# Patient Record
Sex: MALE | Race: BLACK OR AFRICAN AMERICAN | NOT HISPANIC OR LATINO | URBAN - METROPOLITAN AREA
[De-identification: names, ages, dates, MRNs, and addresses within clinical notes are randomized per-mention and may not be internally consistent; named-entity substitution may affect disease eponyms.]

---

## 2017-09-20 PROBLEM — Z00.00 ENCOUNTER FOR PREVENTIVE HEALTH EXAMINATION: Status: ACTIVE | Noted: 2017-09-20

## 2021-06-21 ENCOUNTER — INPATIENT (INPATIENT)
Facility: HOSPITAL | Age: 56
LOS: 31 days | Discharge: HOME HEALTH | DRG: 949 | End: 2021-07-23
Attending: STUDENT IN AN ORGANIZED HEALTH CARE EDUCATION/TRAINING PROGRAM | Admitting: STUDENT IN AN ORGANIZED HEALTH CARE EDUCATION/TRAINING PROGRAM
Payer: MEDICAID

## 2021-06-21 VITALS
OXYGEN SATURATION: 97 % | HEART RATE: 66 BPM | DIASTOLIC BLOOD PRESSURE: 92 MMHG | RESPIRATION RATE: 15 BRPM | TEMPERATURE: 98 F | SYSTOLIC BLOOD PRESSURE: 164 MMHG

## 2021-06-21 DIAGNOSIS — E87.5 HYPERKALEMIA: ICD-10-CM

## 2021-06-21 DIAGNOSIS — R29.898 OTHER SYMPTOMS AND SIGNS INVOLVING THE MUSCULOSKELETAL SYSTEM: ICD-10-CM

## 2021-06-21 DIAGNOSIS — R27.8 OTHER LACK OF COORDINATION: ICD-10-CM

## 2021-06-21 DIAGNOSIS — J06.9 ACUTE UPPER RESPIRATORY INFECTION, UNSPECIFIED: ICD-10-CM

## 2021-06-21 DIAGNOSIS — R45.87 IMPULSIVENESS: ICD-10-CM

## 2021-06-21 DIAGNOSIS — F32.9 MAJOR DEPRESSIVE DISORDER, SINGLE EPISODE, UNSPECIFIED: ICD-10-CM

## 2021-06-21 DIAGNOSIS — N17.9 ACUTE KIDNEY FAILURE, UNSPECIFIED: ICD-10-CM

## 2021-06-21 DIAGNOSIS — R26.9 UNSPECIFIED ABNORMALITIES OF GAIT AND MOBILITY: ICD-10-CM

## 2021-06-21 DIAGNOSIS — I63.9 CEREBRAL INFARCTION, UNSPECIFIED: ICD-10-CM

## 2021-06-21 DIAGNOSIS — F17.210 NICOTINE DEPENDENCE, CIGARETTES, UNCOMPLICATED: ICD-10-CM

## 2021-06-21 DIAGNOSIS — R53.83 OTHER FATIGUE: ICD-10-CM

## 2021-06-21 DIAGNOSIS — I69.311 MEMORY DEFICIT FOLLOWING CEREBRAL INFARCTION: ICD-10-CM

## 2021-06-21 DIAGNOSIS — B97.89 OTHER VIRAL AGENTS AS THE CAUSE OF DISEASES CLASSIFIED ELSEWHERE: ICD-10-CM

## 2021-06-21 DIAGNOSIS — I69.391 DYSPHAGIA FOLLOWING CEREBRAL INFARCTION: ICD-10-CM

## 2021-06-21 DIAGNOSIS — N18.30 CHRONIC KIDNEY DISEASE, STAGE 3 UNSPECIFIED: ICD-10-CM

## 2021-06-21 DIAGNOSIS — E11.22 TYPE 2 DIABETES MELLITUS WITH DIABETIC CHRONIC KIDNEY DISEASE: ICD-10-CM

## 2021-06-21 DIAGNOSIS — Z51.89 ENCOUNTER FOR OTHER SPECIFIED AFTERCARE: ICD-10-CM

## 2021-06-21 DIAGNOSIS — I69.314 FRONTAL LOBE AND EXECUTIVE FUNCTION DEFICIT FOLLOWING CEREBRAL INFARCTION: ICD-10-CM

## 2021-06-21 DIAGNOSIS — E44.0 MODERATE PROTEIN-CALORIE MALNUTRITION: ICD-10-CM

## 2021-06-21 DIAGNOSIS — E88.09 OTHER DISORDERS OF PLASMA-PROTEIN METABOLISM, NOT ELSEWHERE CLASSIFIED: ICD-10-CM

## 2021-06-21 DIAGNOSIS — I69.312 VISUOSPATIAL DEFICIT AND SPATIAL NEGLECT FOLLOWING CEREBRAL INFARCTION: ICD-10-CM

## 2021-06-21 DIAGNOSIS — E11.40 TYPE 2 DIABETES MELLITUS WITH DIABETIC NEUROPATHY, UNSPECIFIED: ICD-10-CM

## 2021-06-21 DIAGNOSIS — H54.7 UNSPECIFIED VISUAL LOSS: ICD-10-CM

## 2021-06-21 DIAGNOSIS — I12.9 HYPERTENSIVE CHRONIC KIDNEY DISEASE WITH STAGE 1 THROUGH STAGE 4 CHRONIC KIDNEY DISEASE, OR UNSPECIFIED CHRONIC KIDNEY DISEASE: ICD-10-CM

## 2021-06-21 DIAGNOSIS — E78.5 HYPERLIPIDEMIA, UNSPECIFIED: ICD-10-CM

## 2021-06-21 DIAGNOSIS — R60.0 LOCALIZED EDEMA: ICD-10-CM

## 2021-06-21 DIAGNOSIS — R80.8 OTHER PROTEINURIA: ICD-10-CM

## 2021-06-21 DIAGNOSIS — I69.322 DYSARTHRIA FOLLOWING CEREBRAL INFARCTION: ICD-10-CM

## 2021-06-21 DIAGNOSIS — I69.310 ATTENTION AND CONCENTRATION DEFICIT FOLLOWING CEREBRAL INFARCTION: ICD-10-CM

## 2021-06-21 DIAGNOSIS — G47.00 INSOMNIA, UNSPECIFIED: ICD-10-CM

## 2021-06-21 LAB
GLUCOSE BLDC GLUCOMTR-MCNC: 189 MG/DL — HIGH (ref 70–99)
GLUCOSE BLDC GLUCOMTR-MCNC: 198 MG/DL — HIGH (ref 70–99)

## 2021-06-21 RX ORDER — SENNA PLUS 8.6 MG/1
2 TABLET ORAL AT BEDTIME
Refills: 0 | Status: DISCONTINUED | OUTPATIENT
Start: 2021-06-21 | End: 2021-07-23

## 2021-06-21 RX ORDER — GLUCAGON INJECTION, SOLUTION 0.5 MG/.1ML
1 INJECTION, SOLUTION SUBCUTANEOUS ONCE
Refills: 0 | Status: DISCONTINUED | OUTPATIENT
Start: 2021-06-21 | End: 2021-07-23

## 2021-06-21 RX ORDER — APIXABAN 2.5 MG/1
2.5 TABLET, FILM COATED ORAL EVERY 12 HOURS
Refills: 0 | Status: DISCONTINUED | OUTPATIENT
Start: 2021-06-21 | End: 2021-07-23

## 2021-06-21 RX ORDER — AMLODIPINE BESYLATE 2.5 MG/1
10 TABLET ORAL DAILY
Refills: 0 | Status: DISCONTINUED | OUTPATIENT
Start: 2021-06-21 | End: 2021-06-22

## 2021-06-21 RX ORDER — SODIUM CHLORIDE 9 MG/ML
1000 INJECTION, SOLUTION INTRAVENOUS
Refills: 0 | Status: DISCONTINUED | OUTPATIENT
Start: 2021-06-21 | End: 2021-07-23

## 2021-06-21 RX ORDER — GABAPENTIN 400 MG/1
600 CAPSULE ORAL AT BEDTIME
Refills: 0 | Status: DISCONTINUED | OUTPATIENT
Start: 2021-06-21 | End: 2021-07-23

## 2021-06-21 RX ORDER — LABETALOL HCL 100 MG
50 TABLET ORAL
Refills: 0 | Status: DISCONTINUED | OUTPATIENT
Start: 2021-06-21 | End: 2021-06-22

## 2021-06-21 RX ORDER — NICOTINE POLACRILEX 2 MG
1 GUM BUCCAL DAILY
Refills: 0 | Status: DISCONTINUED | OUTPATIENT
Start: 2021-06-21 | End: 2021-07-11

## 2021-06-21 RX ORDER — INSULIN LISPRO 100/ML
VIAL (ML) SUBCUTANEOUS
Refills: 0 | Status: DISCONTINUED | OUTPATIENT
Start: 2021-06-21 | End: 2021-07-23

## 2021-06-21 RX ORDER — DEXTROSE 50 % IN WATER 50 %
25 SYRINGE (ML) INTRAVENOUS ONCE
Refills: 0 | Status: DISCONTINUED | OUTPATIENT
Start: 2021-06-21 | End: 2021-07-23

## 2021-06-21 RX ORDER — CITALOPRAM 10 MG/1
20 TABLET, FILM COATED ORAL DAILY
Refills: 0 | Status: DISCONTINUED | OUTPATIENT
Start: 2021-06-21 | End: 2021-06-21

## 2021-06-21 RX ORDER — TRAZODONE HCL 50 MG
50 TABLET ORAL AT BEDTIME
Refills: 0 | Status: DISCONTINUED | OUTPATIENT
Start: 2021-06-21 | End: 2021-07-13

## 2021-06-21 RX ORDER — INSULIN LISPRO 100/ML
VIAL (ML) SUBCUTANEOUS AT BEDTIME
Refills: 0 | Status: DISCONTINUED | OUTPATIENT
Start: 2021-06-21 | End: 2021-07-23

## 2021-06-21 RX ORDER — ESCITALOPRAM OXALATE 10 MG/1
10 TABLET, FILM COATED ORAL DAILY
Refills: 0 | Status: DISCONTINUED | OUTPATIENT
Start: 2021-06-21 | End: 2021-06-21

## 2021-06-21 RX ORDER — CITALOPRAM 10 MG/1
20 TABLET, FILM COATED ORAL DAILY
Refills: 0 | Status: DISCONTINUED | OUTPATIENT
Start: 2021-06-21 | End: 2021-07-23

## 2021-06-21 RX ORDER — ACETAMINOPHEN 500 MG
650 TABLET ORAL EVERY 6 HOURS
Refills: 0 | Status: DISCONTINUED | OUTPATIENT
Start: 2021-06-21 | End: 2021-07-23

## 2021-06-21 RX ORDER — LABETALOL HCL 100 MG
50 TABLET ORAL ONCE
Refills: 0 | Status: COMPLETED | OUTPATIENT
Start: 2021-06-21 | End: 2021-06-21

## 2021-06-21 RX ORDER — ATORVASTATIN CALCIUM 80 MG/1
80 TABLET, FILM COATED ORAL AT BEDTIME
Refills: 0 | Status: DISCONTINUED | OUTPATIENT
Start: 2021-06-21 | End: 2021-07-23

## 2021-06-21 RX ORDER — DEXTROSE 50 % IN WATER 50 %
12.5 SYRINGE (ML) INTRAVENOUS ONCE
Refills: 0 | Status: DISCONTINUED | OUTPATIENT
Start: 2021-06-21 | End: 2021-07-23

## 2021-06-21 RX ORDER — DEXTROSE 50 % IN WATER 50 %
15 SYRINGE (ML) INTRAVENOUS ONCE
Refills: 0 | Status: DISCONTINUED | OUTPATIENT
Start: 2021-06-21 | End: 2021-07-23

## 2021-06-21 RX ADMIN — Medication 50 MILLIGRAM(S): at 22:50

## 2021-06-21 RX ADMIN — ATORVASTATIN CALCIUM 80 MILLIGRAM(S): 80 TABLET, FILM COATED ORAL at 22:07

## 2021-06-21 RX ADMIN — Medication 50 MILLIGRAM(S): at 22:07

## 2021-06-21 RX ADMIN — Medication 50 MILLIGRAM(S): at 18:39

## 2021-06-21 RX ADMIN — APIXABAN 2.5 MILLIGRAM(S): 2.5 TABLET, FILM COATED ORAL at 18:39

## 2021-06-21 RX ADMIN — GABAPENTIN 600 MILLIGRAM(S): 400 CAPSULE ORAL at 22:07

## 2021-06-21 RX ADMIN — SENNA PLUS 2 TABLET(S): 8.6 TABLET ORAL at 22:07

## 2021-06-21 RX ADMIN — Medication 1: at 18:35

## 2021-06-21 NOTE — PATIENT PROFILE ADULT - VISION (WITH CORRECTIVE LENSES IF THE PATIENT USUALLY WEARS THEM):
right visual deficit/Partially impaired: cannot see medication labels or newsprint, but can see obstacles in path, and the surrounding layout; can count fingers at arm's length

## 2021-06-21 NOTE — H&P ADULT - NSHPREVIEWOFSYSTEMS_GEN_ALL_CORE
REVIEW OF SYSTEMS  Constitutional: No fever, No Chills, No fatigue  HEENT: No eye pain, No visual disturbances, No difficulty hearing, No Dysphagia   Pulm: No cough,  No shortness of breath  Cardio: No chest pain, No palpitations  GI:  No abdominal pain, No nausea, No vomiting, No diarrhea, No constipation, No incontinence, Last Bowel Movement on   : No dysuria, No frequency, No hematuria, No incontinence  Neuro: No headaches, No memory loss, No loss of strength, No numbness, No tremors  Skin: No itching, No rashes, No lesions   Endo: No temperature intolerance  MSK: No joint pain, No joint swelling, No muscle pain, No Neck or back pain  Psych:  No depression, No anxiety REVIEW OF SYSTEMS  Constitutional: No fever  HEENT: (+) diminished vision in right eye; No dysphagia  Pulm: No cough,  No shortness of breath  Cardio: No chest pain, No palpitations  GI:  No abdominal pain, No nausea, No vomiting, No diarrhea, No constipation, No incontinence, BM this AM  : No dysuria, No incontinence  Neuro: No headaches, No loss of strength, No numbness  MSK: No joint pain, No muscle pain

## 2021-06-21 NOTE — H&P ADULT - NSICDXPASTMEDICALHX_GEN_ALL_CORE_FT
PAST MEDICAL HISTORY:  Cerebrovascular accident (CVA)     Depression     Diabetes mellitus     Hyperlipidemia     Hypertension

## 2021-06-21 NOTE — H&P ADULT - REASON FOR ADMISSION
Acute right corona radiata CVA with residual left-sided weakness and dysphagia Acute right corona radiata CVA

## 2021-06-21 NOTE — H&P ADULT - NSHPLABSRESULTS_GEN_ALL_CORE
Laboratory-Chemistry:    6/20/21  Na 140  K4.8  BUN 24  Cr 2.11  Glucose 110  A1C 5.5     Hematology:    6/20/21  WBC 4.3  Hgb 12.2  Hct 40.1  Plt 173     Cultures:     utox +cocaine     Radiology:     CT: There is restricted diffusion in the right corona radiate compatible with acute infarct. Additional small areas of bilateral diffusion abnormality involving centrum semiovale, corona radiate, basal ganglia, as well as scattered peripheral locations may reflect additional multifocal subacute infarcts.    Additional patchy white matter FLAIR hyperintensity is present compatible with microvascular ischemic change.    Ventricular caliber is within normal limits for the patient's age. There is no vasogenic edema or focal mass effect.There is no extraaxial collection.    There are foci of susceptibility signal Ahmet compatible with chronic microhemorrhages in left corona radiate, basal ganglia, bilateral frontal operculum, bilateral medial temporal lobes. Some lesions appear new from prior exam.    CT: There is restricted diffusion in the right corona radiate compatible with acute infarct. Additional small areas of bilateral diffusion abnormality involving centrum semiovale, corona radiate, basal ganglia, as well as scattered peripheral locations may reflect additional multifocal subacute infarcts.    Additional patchy white matter FLAIR hyperintensity is present compatible with microvascular ischemic change.    Ventricular caliber is within normal limits for the patient's age. There is no vasogenic edema or focal mass effect.  There is no extraaxial collection.     B/L LE doppler   RIGHT: There is evidence of recanalization noted in the femoral vein at the mid segment. This finding is consistent with chronic venous disease.  However, there is no evidence of acute deep or superficial vein thrombosis in the visualized  veins of the right lower extremity.  LEFT: There is evidence of recanalization noted in the femoral vein at the mid segment. This finding is consistent with chronic venous disease.  However, there is no evidence of acute deep or superficial vein thrombosis in the visualized veins of the left  lower extremity. Laboratory-Chemistry:    6/20/21  Na 140  K4.8  BUN 24  Cr 2.11  Glucose 110  A1C 5.5     Hematology:    6/20/21  WBC 4.3  Hgb 12.2  Hct 40.1  Plt 173     Cultures:     utox +cocaine     Radiology:     CT: There is restricted diffusion in the right corona radiate compatible with acute infarct. Additional small areas of bilateral diffusion abnormality involving centrum semiovale, corona radiate, basal ganglia, as well as scattered peripheral locations may reflect additional multifocal subacute infarcts.  Additional patchy white matter FLAIR hyperintensity is present compatible with microvascular ischemic change.  Ventricular caliber is within normal limits for the patient's age. There is no vasogenic edema or focal mass effect.There is no extraaxial collection.  There are foci of susceptibility signal Ahmet compatible with chronic microhemorrhages in left corona radiate, basal ganglia, bilateral frontal operculum, bilateral medial temporal lobes. Some lesions appear new from prior exam.        CT: There is restricted diffusion in the right corona radiate compatible with acute infarct. Additional small areas of bilateral diffusion abnormality involving centrum semiovale, corona radiate, basal ganglia, as well as scattered peripheral locations may reflect additional multifocal subacute infarcts.  Additional patchy white matter FLAIR hyperintensity is present compatible with microvascular ischemic change.  Ventricular caliber is within normal limits for the patient's age. There is no vasogenic edema or focal mass effect.  There is no extraaxial collection.       B/L LE doppler   RIGHT: There is evidence of recanalization noted in the femoral vein at the mid segment. This finding is consistent with chronic venous disease.  However, there is no evidence of acute deep or superficial vein thrombosis in the visualized  veins of the right lower extremity.  LEFT: There is evidence of recanalization noted in the femoral vein at the mid segment. This finding is consistent with chronic venous disease.  However, there is no evidence of acute deep or superficial vein thrombosis in the visualized veins of the left  lower extremity.      Echo  Severe concentric left ventricular hypertrophy.  Normal left ventricular size.  Low-normal left ventricular ejection fraction. Mild to moderate left atrial dilatation.  Grade 2 diastolic dysfunction with increased LA pressure  Normal right ventricular function.  Right ventricular hypertrophy.      Bilateral renal and urinary bladder sonography was performed. There is no comparison imaging study. The prostate is estimated at 20 g which is normal in size.

## 2021-06-21 NOTE — PATIENT PROFILE ADULT - NSASFALLNEEDSASSIST_GEN_A_NUR
Telephone Encounter by Yamilet Gilbert MA at 08/01/17 01:19 PM     Author:  Yamilet Gilbert MA Service:  (none) Author Type:  Medical Assistant     Filed:  08/01/17 01:25 PM Encounter Date:  7/31/2017 Status:  Signed     :  Yamilet Gilbert MA (Medical Assistant)            Refilled medications per protocol.[KW1.1M]   Prescription was escribed to pharmacy.[KW1.1T]   Patient in need of tsh drawn.[KW1.1M]   Patient notified.[KW1.1T]  Patient coming in for appointment today.   Lab ordered.[KW1.1M]       Revision History        User Key Date/Time User Provider Type Action    > KW1.1 08/01/17 01:25 PM Yamilet Gilbert MA Medical Assistant Sign    M - Manual, T - Template            
yes

## 2021-06-21 NOTE — H&P ADULT - ATTENDING COMMENTS
Pt. seen with resident 6/22/21 AM.  Agree with documentation above as per resident with amendments made as appropriate. Patient medically stable. Appropriate for acute rehabilitation.    Denies complaints,  didn't sleep well     Vital Signs Last 24 Hrs  T(C): 36.3 (22 Jun 2021 07:38), Max: 36.7 (21 Jun 2021 18:00)  T(F): 97.4 (22 Jun 2021 07:38), Max: 98 (21 Jun 2021 18:00)  HR: 58 (22 Jun 2021 07:38) (58 - 78)  BP: 138/86 (22 Jun 2021 07:38) (138/86 - 168/90)  BP(mean): --  RR: 14 (22 Jun 2021 07:38) (14 - 16)  SpO2: 100% (22 Jun 2021 07:38) (97% - 100%)    Physical exam as amended above                          11.9   4.70  )-----------( 223      ( 22 Jun 2021 06:30 )             36.5   06-22    138  |  106  |  35<H>  ----------------------------<  162<H>  4.0   |  23  |  2.55<H>    Ca    8.4      22 Jun 2021 06:30    TPro  6.1  /  Alb  2.6<L>  /  TBili  0.2  /  DBili  x   /  AST  29  /  ALT  28  /  AlkPhos  142<H>  06-22    55M with PMHx of HTN, DM, HLD, ,depression, and prior CVA with no residual deifcits, who presented to [facility] on 06/11/2021 with dysphagia and difficulty ambulating, & dysarthria found to have an acute right corona radiata infarct.     Sleep-- pt. on trazodone,  will monitor for another day and adjust meds PRN.    HTN-- monitor BP on current regimen-- BP high on admission-- received extra dose of labetalol     cont. current meds

## 2021-06-21 NOTE — H&P ADULT - ASSESSMENT
MARK ANTHONY QUINTANA is a 55M with PMHx of HTN, DM, HLD, ,depression, and prior CVA with residual vision deficits in right eye, who presented to [facility] on 06/11/2021 with dysphagia and difficulty ambulating; found to have an acute right corona radiata infarct. Admitted for multidisciplinary rehab program.      #Comprehensive Multidisciplinary Rehab Program:  - Gait, ADL, Functional impairments  - PT/OT/ SLP 3 hours a day 5 days a week    #Acute corona radiata infarct and multiple b/l subacute infarcts  - distribution of b/l subacute infarcts suggestive of cardioembolic origin  - not a candidate for tPA or thrombectomy  - c/w Eliquis 2.5mg q12h and atorvastatin 80mg qhs    #HLD  #HTN  - c/w amlodipine 10mg daily and labetalol  50mg q12h  - c/w atorvastatin    #DM  - FS and ISS    #Neuropathy  - c/w gabapentin 600mg qhs    #Tobacco use  - Nicotine    #Mood/Depression  - c/w Citalopram 20mg daily    #Sleep:  - Maintain quiet hours and low stim environment  - Melatonin PRN    #Pain:  - Tylenol PRN  - avoid sedating meds that may affect cognitive recovery    #GI/Bowel:  - Senna 2 tabs daily  - GI ppx:     #/Bladder:  - Monitor PVR if no void in 8h; SC for >400 cc  - Toileting schedule q4h    #Diet / Dysphagia:    - Diet: soft/honey  - ongoing SLP assessment  - Nutrition to follow    #Skin/ Pressure Injury Prevention:  - assessment on admission   - Turn Q2hrs in bed while awake, OOB to Chair, PT/OT/SLP     #DVT prophylaxis:  - Eliquis  - SCDs  - neg for b/l LE DVTs at [facility]    #Precautions/ Restrictions  - Falls  - Lungs: Aspiration, Incentive Spirometer    - COVID PCR: neg 6/11    --------------------------------------------  Outpatient Follow up:  Neurology  PCP  --------------------------------------------      MEDICAL PROGNOSIS: GOOD            REHAB POTENTIAL: GOOD             ESTIMATED DISPOSITION: HOME WITH HOME CARE            ELOS: 10-14 Days   EXPECTED THERAPY:     P.T. 1hr/day       O.T. 1hr/day      S.L.P. 1hr/day     P&O Unnecessary     EXP FREQUENCY: 5 days per 7 day period     PRESCREEN COMPARISON:   I have reviewed the prescreen information and I have found no relevant changes between the preadmission screening and my post admission evaluation     RATIONALE FOR INPATIENT ADMISSION - Patient demonstrates the following: (check all that apply)  [X] Medically appropriate for rehabilitation admission  [X] Has attainable rehab goals with an appropriate initial discharge plan  [X] Has rehabilitation potential (expected to make a significant improvement within a reasonable period of time)   [X] Requires close medical management by a rehab physician, rehab nursing care, Hospitalist and comprehensive interdisciplinary team (including PT, OT, & or SLP, Prosthetics and Orthotics)  MARK ANTHONY QUINTANA is a 55M with PMHx of HTN, DM, HLD, ,depression, and prior CVA with no residual deifcits, who presented to [facility] on 06/11/2021 with dysphagia and difficulty ambulating; found to have an acute right corona radiata infarct. Admitted for multidisciplinary rehab program.      #Comprehensive Multidisciplinary Rehab Program:  - Gait, ADL, Functional impairments  - PT/OT/ SLP 3 hours a day 5 days a week    #Acute corona radiata infarct and multiple b/l subacute infarcts  - distribution of b/l subacute infarcts suggestive of cardioembolic origin  - not a candidate for tPA or thrombectomy  - c/w Eliquis 2.5mg q12h and atorvastatin 80mg qhs    #HLD  #HTN  - c/w amlodipine 10mg daily and labetalol  50mg q12h  - c/w atorvastatin    #DM  - FS and ISS  - DC home on Metformin 500mg BID, Glipizide 5mg daily, and Sitagliptin 50mg daily    #Neuropathy  - c/w gabapentin 600mg qhs    #Tobacco use  - Nicotine 21mg/24hr    #Mood/Depression  - c/w Citalopram 20mg daily    #Sleep:  - Maintain quiet hours and low stim environment  - Melatonin PRN    #Pain:  - Tylenol PRN  - avoid sedating meds that may affect cognitive recovery    #GI/Bowel:  - Senna 2 tabs qhs and Miralax PRN    #/Bladder:  - Monitor PVR if no void in 8h; SC for >400 cc  - Toileting schedule q4h    #Diet / Dysphagia:    - Diet: soft/honey  - ongoing SLP assessment  - Nutrition to follow    #Skin/ Pressure Injury Prevention:  - assessment on admission   - Turn Q2hrs in bed while awake, OOB to Chair, PT/OT/SLP     #DVT prophylaxis:  - Eliquis  - SCDs  - neg for b/l LE DVTs at [facility]    #Precautions/ Restrictions  - Falls  - Lungs: Aspiration, Incentive Spirometer    - COVID PCR: neg 6/11    --------------------------------------------  Outpatient Follow up:  Neurology - Dr. Nena Dumont on 07/22/2021 @ 11AM via telehealth  PCP  --------------------------------------------      MEDICAL PROGNOSIS: GOOD            REHAB POTENTIAL: GOOD             ESTIMATED DISPOSITION: HOME WITH HOME CARE            ELOS: 10-14 Days   EXPECTED THERAPY:     P.T. 1hr/day       O.T. 1hr/day      S.L.P. 1hr/day     P&O Unnecessary     EXP FREQUENCY: 5 days per 7 day period     PRESCREEN COMPARISON:   I have reviewed the prescreen information and I have found no relevant changes between the preadmission screening and my post admission evaluation     RATIONALE FOR INPATIENT ADMISSION - Patient demonstrates the following: (check all that apply)  [X] Medically appropriate for rehabilitation admission  [X] Has attainable rehab goals with an appropriate initial discharge plan  [X] Has rehabilitation potential (expected to make a significant improvement within a reasonable period of time)   [X] Requires close medical management by a rehab physician, rehab nursing care, Hospitalist and comprehensive interdisciplinary team (including PT, OT, & or SLP, Prosthetics and Orthotics)  MARK ANTHONY QUINTANA is a 55M with PMHx of HTN, DM, HLD, ,depression, and prior CVA with no residual deifcits, who presented to [facility] on 06/11/2021 with dysphagia and difficulty ambulating, & dysarthria found to have an acute right corona radiata infarct. Admitted for multidisciplinary rehab program.      #Comprehensive Multidisciplinary Rehab Program:  - Gait, ADL, Functional impairments  - PT/OT/ SLP 3 hours a day 5 days a week    #Acute corona radiata infarct and multiple b/l subacute infarcts  - distribution of b/l subacute infarcts suggestive of cardioembolic origin  - not a candidate for tPA or thrombectomy  - c/w Eliquis 2.5mg q12h and atorvastatin 80mg qhs    #HLD  #HTN  - c/w amlodipine 10mg daily and labetalol  50mg q12h  - c/w atorvastatin    #DM  - FS and ISS  - DC home on Metformin 500mg BID, Glipizide 5mg daily, and Sitagliptin 50mg daily    #Neuropathy  - c/w gabapentin 600mg qhs    #Tobacco use  - Nicotine 21mg/24hr    #Mood/Depression  - c/w Citalopram 20mg daily    #Sleep:  - Maintain quiet hours and low stim environment  - Melatonin PRN    #Pain:  - Tylenol PRN  - avoid sedating meds that may affect cognitive recovery    #GI/Bowel:  - Senna 2 tabs qhs and Miralax PRN    #/Bladder:  - Monitor PVR if no void in 8h; SC for >400 cc  - Toileting schedule q4h    #Diet / Dysphagia:    - Diet: soft/honey  - ongoing SLP assessment  - Nutrition to follow    #Skin/ Pressure Injury Prevention:  - assessment on admission   - Turn Q2hrs in bed while awake, OOB to Chair, PT/OT/SLP     #DVT prophylaxis:  - Eliquis  - SCDs  - neg for b/l LE DVTs at [facility]    #Precautions/ Restrictions  - Falls  - Lungs: Aspiration, Incentive Spirometer    - COVID PCR: neg 6/11    --------------------------------------------  Outpatient Follow up:  Neurology - Dr. Nena Dumont on 07/22/2021 @ 11AM via telehealth  PCP  --------------------------------------------      MEDICAL PROGNOSIS: GOOD            REHAB POTENTIAL: GOOD             ESTIMATED DISPOSITION: HOME WITH HOME CARE            ELOS: 10-14 Days   EXPECTED THERAPY:     P.T. 1hr/day       O.T. 1hr/day      S.L.P. 1hr/day     P&O Unnecessary     EXP FREQUENCY: 5 days per 7 day period     PRESCREEN COMPARISON:   I have reviewed the prescreen information and I have found no relevant changes between the preadmission screening and my post admission evaluation     RATIONALE FOR INPATIENT ADMISSION - Patient demonstrates the following: (check all that apply)  [X] Medically appropriate for rehabilitation admission  [X] Has attainable rehab goals with an appropriate initial discharge plan  [X] Has rehabilitation potential (expected to make a significant improvement within a reasonable period of time)   [X] Requires close medical management by a rehab physician, rehab nursing care, Hospitalist and comprehensive interdisciplinary team (including PT, OT, & or SLP, Prosthetics and Orthotics)

## 2021-06-21 NOTE — H&P ADULT - NSHPPHYSICALEXAM_GEN_ALL_CORE
Constitutional - NAD, Comfortable  HEENT - NCAT, EOMI  Neck - Supple, No limited ROM  Chest - good chest expansion, good respiratory effort, CTAB   Cardio - warm and well perfused, RRR, no murmur  Abdomen -  Soft, NTND  Extremities - No peripheral edema, No calf tenderness   Neurologic Exam:                    Cognitive -             Orientation: Awake, Alert, AAO to self, place, date, year, situation            Attention:  Days of week, recall 3 objects without cuing            Memory: Recent/ remote memory intact            Thought: process, content appropriate     Speech - Fluent, Comprehensible, No dysarthria, No aphasia      Cranial Nerves - No facial asymmetry, Tongue midline, EOMI, Shoulder shrug intact     Motor -                      LEFT    UE - ShAB 5/5, EF 5/5, EE 5/5, WE 5/5,  WNL                    RIGHT UE - ShAB 5/5, EF 5/5, EE 5/5, WE 5/5,  WNL                    LEFT    LE - HF 5/5, KE 5/5, DF 5/5, PF 5/5                    RIGHT LE - HF 5/5, KE 5/5, DF 5/5, PF 5/5        Sensory - Intact to LT bilateral     Reflexes - DTR _ / 4 , neg Freire's b/l, neg Babinski's b/l     Coordination - FTN / HTS intact     OculoVestibular -  No nystagmus  Psychiatric - Mood stable, Affect WNL  Skin on admission: Constitutional - NAD, Comfortable  HEENT - NCAT, EOMI  Neck - Supple, No limited ROM  Chest - good chest expansion, good respiratory effort, CTAB   Cardio - warm and well perfused, RRR, no murmur  Abdomen -  Soft, NTND, (+) BS  Extremities - No peripheral edema, No calf tenderness   Neurologic Exam:                    Cognitive -             Orientation: A&O x4            Attention:  able to spell "WORLD" forwards, but not backwards; unable to perform serial 7s            Memory: immediate recall intact; delayed recall 1/3 words     Speech - (+) mild dysarthria with some words-finding difficulty     Cranial Nerves - No facial asymmetry, Tongue midline, EOMI, Shoulder shrug intact     Motor -                      LEFT    UE - ShAB 5/5, EF 5/5, EE 4/5, WE 5/5,  WNL                    RIGHT UE - ShAB 5/5, EF 5/5, EE 4/5, WE 5/5,  WNL                    LEFT    LE - HF 5/5, KE 5/5, DF 5/5, PF 5/5                    RIGHT LE - HF 5/5, KE 4/5, DF 4/5, PF 4/5        Sensory - diminished sensation in dorsum and plantar aspect of right foot     Reflexes - neg Babinski's b/l     Coordination - FTN & HTS intact     OculoVestibular -  No nystagmus  Psychiatric - Blunted affect  Skin on admission: Constitutional - NAD, Comfortable  HEENT - NCAT, EOMI  Neck - Supple, No limited ROM  Chest - CTAB   Cardio - warm and well perfused, RRR, no murmur  Abdomen -  Soft, NTND, (+) BS  Extremities - No peripheral edema, No calf tenderness   Neurologic Exam:                    Cognitive -             Orientation: A&O x4            Attention:  able to spell "WORLD" forwards, but not backwards; unable to perform serial 7s, able to state days of week backward            Memory: recall 0/3 spontaneously after few minutes     Speech - (+) mild dysarthria,  No aphasia     Cranial Nerves - Right pupil sluggish, left pupil reactive, Visual fields slightly impaired on right, flattening of right NLF, Tongue midline, EOMI, Shoulder shrug intact, +dysarthria, +dysphagia     Motor -                      LEFT    UE - ShAB 5/5, EF 5/5, EE 4/5, WE 5/5,  WNL                    RIGHT UE - ShAB 5/5, EF 5/5, EE 4/5, WE 5/5,  WNL                    LEFT    LE - HF 5/5, KE 5/5, DF 5/5, PF 5/5                    RIGHT LE - HF 4/5, KE 4/5, DF 4/5, PF 4/5        Sensory - diminished sensation in dorsum and plantar aspect of right foot     Reflexes - neg Babinski's b/l     Coordination - FTN intact & HTS slightly impaired on right     OculoVestibular -  No nystagmus  Psychiatric - mood stable, appropriate  Skin on admission:

## 2021-06-21 NOTE — H&P ADULT - NSHPSOCIALHISTORY_GEN_ALL_CORE
SOCIAL HISTORY  Smoking - current smoker; 1/2 ppd for past 39 years  EtOH - denies  Admits to occasional recreational drug use (cocaine)  Lives alone in an apartment      FUNCTIONAL HISTORY  Previous Functional Status:  Independent in ambulation, ADL's, transfers prior to hospitalization    Current Functional Status:  Transfers: CG with RW  Gait / ambulation: ambulates 40' x2 CG-min assist with RW  ADL's: setup for UBD  Speech: soft with honey-thick consistency fluids SOCIAL HISTORY  Smoking - current smoker; 1/2 ppd for past 39 years  EtOH - denies  Admits to occasional recreational drug use (cocaine)  Lives alone in an apartment with no stairs.  Ambulated with can inside and outside home.  Independent in ADLs PTA,  Had HHA 6 h/d x 2 days/week who helped with chores/shopping  Prior profession-- window washing,  GED education          Current Functional Status:  Transfers: CG with RW  Gait / ambulation: ambulates 40' x2 CG-min assist with RW  ADL's: setup for UBD  Speech: soft with honey-thick consistency fluids

## 2021-06-21 NOTE — H&P ADULT - HISTORY OF PRESENT ILLNESS
MARK ANTHONY QUINTANA is a 55M with PMHx of HTN, DM, HLD, ,depression, and prior CVA with residual vision deficits in right eye, who presented to [facility] on 06/11/2021 with several days of dysphagia and difficulty ambulating. Patient had been experiencing symptoms since 6/8 after coming home from a dental appointment. He was brought to the hospital after a wellness check from his insurance company. In the ED, his Utox tested positive for cocaine, though patient denied use, and CT head scan showed findings c/w an acute right corona radiata infarct, along with multiple b/l subacute infarcts. Patient was managed medically, as he was not a candidate for either tPA or thrombectomy.    Patient was evaluated by PM&R and therapy for functional deficits and gait/ ADL impairments and recommended acute rehabilitation. Patient was medically optimized for discharge to [facility] on 06/21/2021. MARK ANTHONY QUINTANA is a 55M with PMHx of HTN, DM, HLD, ,depression, and prior CVA with no residual deficits, who presented to [facility] on 06/11/2021 with several days of dysphagia and difficulty ambulating. Patient had been experiencing symptoms since 6/8 after coming home from a dental appointment. He was brought to the hospital after a wellness check from his insurance company. In the ED, his Utox tested positive for cocaine, though patient denied use, and CT head scan showed findings c/w an acute right corona radiata infarct, along with multiple b/l subacute infarcts. Patient was managed medically, as he was not a candidate for either tPA or thrombectomy.    Patient was evaluated by PM&R and therapy for functional deficits and gait/ ADL impairments and recommended acute rehabilitation. Patient was medically optimized for discharge to [facility] on 06/21/2021. Patient was seen and examined at the bedside upon arrival.

## 2021-06-22 LAB
A1C WITH ESTIMATED AVERAGE GLUCOSE RESULT: 5.9 % — HIGH (ref 4–5.6)
ALBUMIN SERPL ELPH-MCNC: 2.6 G/DL — LOW (ref 3.3–5)
ALP SERPL-CCNC: 142 U/L — HIGH (ref 40–120)
ALT FLD-CCNC: 28 U/L — SIGNIFICANT CHANGE UP (ref 10–45)
ANION GAP SERPL CALC-SCNC: 9 MMOL/L — SIGNIFICANT CHANGE UP (ref 5–17)
AST SERPL-CCNC: 29 U/L — SIGNIFICANT CHANGE UP (ref 10–40)
BASOPHILS # BLD AUTO: 0.04 K/UL — SIGNIFICANT CHANGE UP (ref 0–0.2)
BASOPHILS NFR BLD AUTO: 0.9 % — SIGNIFICANT CHANGE UP (ref 0–2)
BILIRUB SERPL-MCNC: 0.2 MG/DL — SIGNIFICANT CHANGE UP (ref 0.2–1.2)
BUN SERPL-MCNC: 35 MG/DL — HIGH (ref 7–23)
CALCIUM SERPL-MCNC: 8.4 MG/DL — SIGNIFICANT CHANGE UP (ref 8.4–10.5)
CHLORIDE SERPL-SCNC: 106 MMOL/L — SIGNIFICANT CHANGE UP (ref 96–108)
CO2 SERPL-SCNC: 23 MMOL/L — SIGNIFICANT CHANGE UP (ref 22–31)
COVID-19 SPIKE DOMAIN AB INTERP: NEGATIVE — SIGNIFICANT CHANGE UP
COVID-19 SPIKE DOMAIN ANTIBODY RESULT: 0.4 U/ML — SIGNIFICANT CHANGE UP
CREAT SERPL-MCNC: 2.55 MG/DL — HIGH (ref 0.5–1.3)
EOSINOPHIL # BLD AUTO: 0.24 K/UL — SIGNIFICANT CHANGE UP (ref 0–0.5)
EOSINOPHIL NFR BLD AUTO: 5.1 % — SIGNIFICANT CHANGE UP (ref 0–6)
ESTIMATED AVERAGE GLUCOSE: 123 MG/DL — HIGH (ref 68–114)
GLUCOSE BLDC GLUCOMTR-MCNC: 166 MG/DL — HIGH (ref 70–99)
GLUCOSE BLDC GLUCOMTR-MCNC: 194 MG/DL — HIGH (ref 70–99)
GLUCOSE BLDC GLUCOMTR-MCNC: 198 MG/DL — HIGH (ref 70–99)
GLUCOSE BLDC GLUCOMTR-MCNC: 227 MG/DL — HIGH (ref 70–99)
GLUCOSE SERPL-MCNC: 162 MG/DL — HIGH (ref 70–99)
HCT VFR BLD CALC: 36.5 % — LOW (ref 39–50)
HCV AB S/CO SERPL IA: 0.14 S/CO — SIGNIFICANT CHANGE UP (ref 0–0.99)
HCV AB SERPL-IMP: SIGNIFICANT CHANGE UP
HGB BLD-MCNC: 11.9 G/DL — LOW (ref 13–17)
IMM GRANULOCYTES NFR BLD AUTO: 0.4 % — SIGNIFICANT CHANGE UP (ref 0–1.5)
LYMPHOCYTES # BLD AUTO: 1.59 K/UL — SIGNIFICANT CHANGE UP (ref 1–3.3)
LYMPHOCYTES # BLD AUTO: 33.8 % — SIGNIFICANT CHANGE UP (ref 13–44)
MCHC RBC-ENTMCNC: 30 PG — SIGNIFICANT CHANGE UP (ref 27–34)
MCHC RBC-ENTMCNC: 32.6 GM/DL — SIGNIFICANT CHANGE UP (ref 32–36)
MCV RBC AUTO: 91.9 FL — SIGNIFICANT CHANGE UP (ref 80–100)
MONOCYTES # BLD AUTO: 0.51 K/UL — SIGNIFICANT CHANGE UP (ref 0–0.9)
MONOCYTES NFR BLD AUTO: 10.9 % — SIGNIFICANT CHANGE UP (ref 2–14)
NEUTROPHILS # BLD AUTO: 2.3 K/UL — SIGNIFICANT CHANGE UP (ref 1.8–7.4)
NEUTROPHILS NFR BLD AUTO: 48.9 % — SIGNIFICANT CHANGE UP (ref 43–77)
NRBC # BLD: 0 /100 WBCS — SIGNIFICANT CHANGE UP (ref 0–0)
PLATELET # BLD AUTO: 223 K/UL — SIGNIFICANT CHANGE UP (ref 150–400)
POTASSIUM SERPL-MCNC: 4 MMOL/L — SIGNIFICANT CHANGE UP (ref 3.5–5.3)
POTASSIUM SERPL-SCNC: 4 MMOL/L — SIGNIFICANT CHANGE UP (ref 3.5–5.3)
PROT SERPL-MCNC: 6.1 G/DL — SIGNIFICANT CHANGE UP (ref 6–8.3)
RBC # BLD: 3.97 M/UL — LOW (ref 4.2–5.8)
RBC # FLD: 11.4 % — SIGNIFICANT CHANGE UP (ref 10.3–14.5)
SARS-COV-2 IGG+IGM SERPL QL IA: 0.4 U/ML — SIGNIFICANT CHANGE UP
SARS-COV-2 IGG+IGM SERPL QL IA: NEGATIVE — SIGNIFICANT CHANGE UP
SARS-COV-2 RNA SPEC QL NAA+PROBE: SIGNIFICANT CHANGE UP
SODIUM SERPL-SCNC: 138 MMOL/L — SIGNIFICANT CHANGE UP (ref 135–145)
WBC # BLD: 4.7 K/UL — SIGNIFICANT CHANGE UP (ref 3.8–10.5)
WBC # FLD AUTO: 4.7 K/UL — SIGNIFICANT CHANGE UP (ref 3.8–10.5)

## 2021-06-22 PROCEDURE — 99223 1ST HOSP IP/OBS HIGH 75: CPT

## 2021-06-22 RX ORDER — SODIUM CHLORIDE 9 MG/ML
1000 INJECTION INTRAMUSCULAR; INTRAVENOUS; SUBCUTANEOUS
Refills: 0 | Status: DISCONTINUED | OUTPATIENT
Start: 2021-06-22 | End: 2021-06-24

## 2021-06-22 RX ORDER — INSULIN GLARGINE 100 [IU]/ML
8 INJECTION, SOLUTION SUBCUTANEOUS AT BEDTIME
Refills: 0 | Status: DISCONTINUED | OUTPATIENT
Start: 2021-06-22 | End: 2021-07-16

## 2021-06-22 RX ORDER — AMLODIPINE BESYLATE 2.5 MG/1
10 TABLET ORAL DAILY
Refills: 0 | Status: DISCONTINUED | OUTPATIENT
Start: 2021-06-22 | End: 2021-07-01

## 2021-06-22 RX ORDER — LABETALOL HCL 100 MG
50 TABLET ORAL
Refills: 0 | Status: DISCONTINUED | OUTPATIENT
Start: 2021-06-22 | End: 2021-07-05

## 2021-06-22 RX ADMIN — ATORVASTATIN CALCIUM 80 MILLIGRAM(S): 80 TABLET, FILM COATED ORAL at 22:21

## 2021-06-22 RX ADMIN — INSULIN GLARGINE 8 UNIT(S): 100 INJECTION, SOLUTION SUBCUTANEOUS at 22:20

## 2021-06-22 RX ADMIN — GABAPENTIN 600 MILLIGRAM(S): 400 CAPSULE ORAL at 22:21

## 2021-06-22 RX ADMIN — Medication 1 PATCH: at 11:18

## 2021-06-22 RX ADMIN — CITALOPRAM 20 MILLIGRAM(S): 10 TABLET, FILM COATED ORAL at 11:18

## 2021-06-22 RX ADMIN — Medication 1 PATCH: at 18:36

## 2021-06-22 RX ADMIN — Medication 50 MILLIGRAM(S): at 05:52

## 2021-06-22 RX ADMIN — Medication 50 MILLIGRAM(S): at 22:20

## 2021-06-22 RX ADMIN — SODIUM CHLORIDE 75 MILLILITER(S): 9 INJECTION INTRAMUSCULAR; INTRAVENOUS; SUBCUTANEOUS at 22:20

## 2021-06-22 RX ADMIN — Medication 50 MILLIGRAM(S): at 17:20

## 2021-06-22 RX ADMIN — Medication 2: at 11:16

## 2021-06-22 RX ADMIN — APIXABAN 2.5 MILLIGRAM(S): 2.5 TABLET, FILM COATED ORAL at 17:19

## 2021-06-22 RX ADMIN — Medication 1: at 07:36

## 2021-06-22 RX ADMIN — Medication 1: at 16:39

## 2021-06-22 RX ADMIN — APIXABAN 2.5 MILLIGRAM(S): 2.5 TABLET, FILM COATED ORAL at 05:53

## 2021-06-22 RX ADMIN — SODIUM CHLORIDE 75 MILLILITER(S): 9 INJECTION INTRAMUSCULAR; INTRAVENOUS; SUBCUTANEOUS at 18:36

## 2021-06-22 RX ADMIN — AMLODIPINE BESYLATE 10 MILLIGRAM(S): 2.5 TABLET ORAL at 05:53

## 2021-06-22 RX ADMIN — SENNA PLUS 2 TABLET(S): 8.6 TABLET ORAL at 22:20

## 2021-06-22 NOTE — CONSULT NOTE ADULT - ASSESSMENT
56 y/o M with PMHx of HTN, DM, HLD, ,depression, and prior CVA with no residual deficits, who presented to [facility] on 06/11/2021 with dysphagia and difficulty ambulating; found to have an acute right corona radiata infarct. Admitted for multidisciplinary rehab program.    #Acute corona radiata infarct and multiple b/l subacute infarcts  -Start comprehensive rehab program -PT/OT/SLP per rehab team  -Pain management, bowel regimen per rehab   -Eliquis 2.5mg q12h and atorvastatin 80mg qhs    #JOSE, baseline Cr unknown  -BUN/Cr 35/2.55, per chart review BUN/Cr 24/2.11 on 6/20  -IVF, encourage PO hydration  -Avoid nephrotoxins, monitor bmp     #HLD  #HTN - uncontrolled  -Amlodipine 10mg qd, labetalol 50mg q12h  -Lipitor 80mg qhs    #T2DM, A1C 5.5   - FS and ISS  - DC home on Metformin 500mg BID, Glipizide 5mg daily, and Sitagliptin 50mg daily  - Start lantus 8un qhs while in rehab    #Neuropathy  -Gabapentin 600mg qhs    #Chronic Tobacco use  -Nicotine 21mg/24hr  -Smoking cessation counseling provided    #Mood/Depression  -Citalopram 20mg qd    #DVT ppx - Eliquis  - neg for b/l LE DVTs at [facility] 54 y/o M with PMHx of HTN, T2DM, HLD, depression, and prior CVA with no residual deficits, who presented to [facility] on 06/11/2021 with dysphagia and difficulty ambulating; found to have an acute right corona radiata infarct. Admitted for multidisciplinary rehab program.    #Acute corona radiata infarct and multiple b/l subacute infarcts  -Start comprehensive rehab program -PT/OT/SLP per rehab team  -Pain management, bowel regimen per rehab   -Eliquis 2.5mg q12h and atorvastatin 80mg qhs    #JOSE, baseline Cr unknown  -BUN/Cr 35/2.55, per chart review BUN/Cr 24/2.11 on 6/20  -Gentle IVF, encourage PO hydration  -Avoid nephrotoxins, monitor bmp     #HLD  #HTN - uncontrolled  -Amlodipine 10mg qd, labetalol 50mg q12h  -Lipitor 80mg qhs    #T2DM, A1C 5.9 - controlled  - FS and ISS  - DC home on Metformin 500mg BID, Glipizide 5mg daily, and Sitagliptin 50mg daily  - Start lantus 8un qhs while in rehab    #Neuropathy  -Gabapentin 600mg qhs    #Chronic Tobacco use  -Nicotine 21mg/24hr  -Smoking cessation counseling provided    #Mood/Depression  -Citalopram 20mg qd    #DVT ppx - Eliquis  - neg for b/l LE DVTs at [facility]

## 2021-06-22 NOTE — DIETITIAN NUTRITION RISK NOTIFICATION - TREATMENT: THE FOLLOWING DIET HAS BEEN RECOMMENDED
Diet, Soft:   Consistent Carbohydrate {Evening Snack}  DASH/TLC {Sodium & Cholesterol Restricted}  Dysphagia 3, Soft, Honey Consistency Fluid (DYS3HC) (06-21-21 @ 18:24) [Active]

## 2021-06-22 NOTE — DIETITIAN INITIAL EVALUATION ADULT. - OTHER INFO
Pt did not like food he got this morning  t said he likes eggs and ham or eggs and turkey Pt did not like food he got this morning  t said he likes eggs and ham or eggs and turkey  Pt was lethargic and was unable to receive nutrition education Pt is a 55M with PMHx of HTN, DM, HLD, ,depression, and prior CVA with no residual deifcits, who presented to [facility] on 06/11/2021 with dysphagia and difficulty ambulating; found to have an acute right corona radiata infarct. Admitted for multidisciplinary rehab program. Pt is on a soft consistent CHO diet with an evening snack, DASH/TLC with sodium and cholesterol restriction, Dysphagia 3, soft honey fluid consistency. Pt reports not liking his breakfast this morning. Pt reports food preferences of eggs and ham or eggs and turkey. Pt requested regular water, and told him SLP will assess. Based on diet hx pt does not seem to follow any diet for DM or HTN however was lethargic and unable to receive nutrition education at this time.

## 2021-06-22 NOTE — CONSULT NOTE ADULT - SUBJECTIVE AND OBJECTIVE BOX
Patient is a 55y old  Male who presents with a chief complaint of Acute right corona radiata CVA (21 Jun 2021 15:04)    HPI:  MARK ANTHONY QUINTANA is a 55M with PMHx of HTN, DM, HLD, ,depression, and prior CVA with no residual deficits, who presented to [facility] on 06/11/2021 with several days of dysphagia and difficulty ambulating. Patient had been experiencing symptoms since 6/8 after coming home from a dental appointment. He was brought to the hospital after a wellness check from his insurance company. In the ED, his Utox tested positive for cocaine, though patient denied use, and CT head scan showed findings c/w an acute right corona radiata infarct, along with multiple b/l subacute infarcts. Patient was managed medically, as he was not a candidate for either tPA or thrombectomy. Patient was evaluated by PM&R and therapy for functional deficits and gait/ ADL impairments and recommended acute rehabilitation. Patient was medically optimized for discharge to [facility] on 06/21/2021. Patient was seen and examined at the bedside upon arrival.  (21 Jun 2021 15:04)    Patient seen and examined at bedside, stable, NAD. denies headache, fever, chills, cp, sob, n/v, abd pain, worsening numbness/tingling in the extremities.    PAST MEDICAL & SURGICAL HISTORY:  Cerebrovascular accident (CVA)  Hypertension  Hyperlipidemia  Diabetes mellitus  Depression    SOCIAL HISTORY: Chronic tobacco smoker - 1/2 ppd x39 years. Denies EtOH use. +occasional illicit drug use - cocaine   Lives alone in an apartment. Previously independent with ADLs, IADLs, and ambulation    FAMILY HISTORY:    ALLERGIES:  No Known Allergies    MEDICATIONS  (STANDING):  amLODIPine   Tablet 10 milliGRAM(s) Oral daily  apixaban 2.5 milliGRAM(s) Oral every 12 hours  atorvastatin 80 milliGRAM(s) Oral at bedtime  citalopram 20 milliGRAM(s) Oral daily  dextrose 40% Gel 15 Gram(s) Oral once  dextrose 5%. 1000 milliLiter(s) (50 mL/Hr) IV Continuous <Continuous>  dextrose 5%. 1000 milliLiter(s) (100 mL/Hr) IV Continuous <Continuous>  dextrose 50% Injectable 25 Gram(s) IV Push once  dextrose 50% Injectable 12.5 Gram(s) IV Push once  dextrose 50% Injectable 25 Gram(s) IV Push once  gabapentin 600 milliGRAM(s) Oral at bedtime  glucagon  Injectable 1 milliGRAM(s) IntraMuscular once  insulin lispro (ADMELOG) corrective regimen sliding scale   SubCutaneous three times a day before meals  insulin lispro (ADMELOG) corrective regimen sliding scale   SubCutaneous at bedtime  labetalol 50 milliGRAM(s) Oral two times a day  nicotine - 21 mG/24Hr(s) Patch 1 patch Transdermal daily  senna 2 Tablet(s) Oral at bedtime  traZODone 50 milliGRAM(s) Oral at bedtime    MEDICATIONS  (PRN):  acetaminophen    Suspension .. 650 milliGRAM(s) Oral every 6 hours PRN Temp greater or equal to 38C (100.4F), Mild Pain (1 - 3)    Review of Systems: Refer to HPI for pertinent positives and negatives. All other ROS reviewed and negative except as otherwise stated above.    Vital Signs Last 24 Hrs  T(F): 97.4 (22 Jun 2021 07:38), Max: 98 (21 Jun 2021 18:00)  HR: 58 (22 Jun 2021 07:38) (58 - 78)  BP: 138/86 (22 Jun 2021 07:38) (138/86 - 168/90)  RR: 14 (22 Jun 2021 07:38) (14 - 16)  SpO2: 100% (22 Jun 2021 07:38) (97% - 100%)  I&O's Summary    21 Jun 2021 07:01  -  22 Jun 2021 07:00  --------------------------------------------------------  IN: 420 mL / OUT: 520 mL / NET: -100 mL      PHYSICAL EXAM:  GENERAL: NAD, well-groomed, well-developed  HEAD:  Atraumatic, Normocephalic  EYES: EOMI, PERRL, conjunctiva and sclera clear  ENMT: Moist mucous membranes, Good dentition  NECK: Supple, No JVD  CHEST/LUNG: Clear to auscultation bilaterally, non-labored breathing, good air entry  HEART: RRR; S1/S2, No murmur  ABDOMEN: Soft, Nontender, Nondistended; Bowel sounds present  VASCULAR: Normal pulses, Normal capillary refill  EXTREMITIES: No cyanosis, No edema  LYMPH: No lymphadenopathy noted  SKIN: Warm, Intact  PSYCH: Normal mood and affect  NERVOUS SYSTEM:  A/O x3, Good concentration    LABS:                        11.9   4.70  )-----------( 223      ( 22 Jun 2021 06:30 )             36.5     06-22    138  |  106  |  35  ----------------------------<  162  4.0   |  23  |  2.55    Ca    8.4      22 Jun 2021 06:30    TPro  6.1  /  Alb  2.6  /  TBili  0.2  /  DBili  x   /  AST  29  /  ALT  28  /  AlkPhos  142  06-22    eGFR if Non African American: 27 mL/min/1.73M2 (06-22-21 @ 06:30)  eGFR if African American: 31 mL/min/1.73M2 (06-22-21 @ 06:30)    POCT Blood Glucose.: 166 mg/dL (22 Jun 2021 07:36)  POCT Blood Glucose.: 189 mg/dL (21 Jun 2021 22:05)  POCT Blood Glucose.: 198 mg/dL (21 Jun 2021 17:44)    COVID-19 PCR: NotDetec (06-21-21 @ 23:30)    RADIOLOGY & ADDITIONAL TESTS: reviewed  Cultures:     utox +cocaine     Radiology:     CT: There is restricted diffusion in the right corona radiate compatible with acute infarct. Additional small areas of bilateral diffusion abnormality involving centrum semiovale, corona radiate, basal ganglia, as well as scattered peripheral locations may reflect additional multifocal subacute infarcts.  Additional patchy white matter FLAIR hyperintensity is present compatible with microvascular ischemic change.  Ventricular caliber is within normal limits for the patient's age. There is no vasogenic edema or focal mass effect.There is no extraaxial collection.  There are foci of susceptibility signal Ahmet compatible with chronic microhemorrhages in left corona radiate, basal ganglia, bilateral frontal operculum, bilateral medial temporal lobes. Some lesions appear new from prior exam.        CT: There is restricted diffusion in the right corona radiate compatible with acute infarct. Additional small areas of bilateral diffusion abnormality involving centrum semiovale, corona radiate, basal ganglia, as well as scattered peripheral locations may reflect additional multifocal subacute infarcts.  Additional patchy white matter FLAIR hyperintensity is present compatible with microvascular ischemic change.  Ventricular caliber is within normal limits for the patient's age. There is no vasogenic edema or focal mass effect.  There is no extraaxial collection.       B/L LE doppler   RIGHT: There is evidence of recanalization noted in the femoral vein at the mid segment. This finding is consistent with chronic venous disease.  However, there is no evidence of acute deep or superficial vein thrombosis in the visualized  veins of the right lower extremity.  LEFT: There is evidence of recanalization noted in the femoral vein at the mid segment. This finding is consistent with chronic venous disease.  However, there is no evidence of acute deep or superficial vein thrombosis in the visualized veins of the left  lower extremity.      Echo  Severe concentric left ventricular hypertrophy.  Normal left ventricular size.  Low-normal left ventricular ejection fraction. Mild to moderate left atrial dilatation.  Grade 2 diastolic dysfunction with increased LA pressure  Normal right ventricular function.  Right ventricular hypertrophy.      Bilateral renal and urinary bladder sonography was performed. There is no comparison imaging study. The prostate is estimated at 20 g which is normal in size.      Care Discussed with Consultants/Other Providers: yes - rehab Patient is a 55y old  Male who presents with a chief complaint of Acute right corona radiata CVA (21 Jun 2021 15:04)    HPI:  MARK ANTHONY QUINTANA is a 55M with PMHx of HTN, DM, HLD, ,depression, and prior CVA with no residual deficits, who presented to [facility] on 06/11/2021 with several days of dysphagia and difficulty ambulating. Patient had been experiencing symptoms since 6/8 after coming home from a dental appointment. He was brought to the hospital after a wellness check from his insurance company. In the ED, his Utox tested positive for cocaine, though patient denied use, and CT head scan showed findings c/w an acute right corona radiata infarct, along with multiple b/l subacute infarcts. Patient was managed medically, as he was not a candidate for either tPA or thrombectomy. Patient was evaluated by PM&R and therapy for functional deficits and gait/ ADL impairments and recommended acute rehabilitation. Patient was medically optimized for discharge to [facility] on 06/21/2021. Patient was seen and examined at the bedside upon arrival.  (21 Jun 2021 15:04)    Patient seen and examined at bedside, stable, NAD. denies headache, fever, chills, cp, sob, n/v, abd pain. +fatigue    PAST MEDICAL & SURGICAL HISTORY:  Cerebrovascular accident (CVA)  Hypertension  Hyperlipidemia  Diabetes mellitus  Depression    SOCIAL HISTORY: Chronic tobacco smoker - 1/2 ppd x39 years. Denies EtOH use. +occasional illicit drug use - cocaine   Lives alone in an apartment. Previously independent with ADLs, IADLs, and ambulation    FAMILY HISTORY: denies pertinent family hx     ALLERGIES:  No Known Allergies    MEDICATIONS  (STANDING):  amLODIPine   Tablet 10 milliGRAM(s) Oral daily  apixaban 2.5 milliGRAM(s) Oral every 12 hours  atorvastatin 80 milliGRAM(s) Oral at bedtime  citalopram 20 milliGRAM(s) Oral daily  dextrose 40% Gel 15 Gram(s) Oral once  dextrose 5%. 1000 milliLiter(s) (50 mL/Hr) IV Continuous <Continuous>  dextrose 5%. 1000 milliLiter(s) (100 mL/Hr) IV Continuous <Continuous>  dextrose 50% Injectable 25 Gram(s) IV Push once  dextrose 50% Injectable 12.5 Gram(s) IV Push once  dextrose 50% Injectable 25 Gram(s) IV Push once  gabapentin 600 milliGRAM(s) Oral at bedtime  glucagon  Injectable 1 milliGRAM(s) IntraMuscular once  insulin lispro (ADMELOG) corrective regimen sliding scale   SubCutaneous three times a day before meals  insulin lispro (ADMELOG) corrective regimen sliding scale   SubCutaneous at bedtime  labetalol 50 milliGRAM(s) Oral two times a day  nicotine - 21 mG/24Hr(s) Patch 1 patch Transdermal daily  senna 2 Tablet(s) Oral at bedtime  traZODone 50 milliGRAM(s) Oral at bedtime    MEDICATIONS  (PRN):  acetaminophen    Suspension .. 650 milliGRAM(s) Oral every 6 hours PRN Temp greater or equal to 38C (100.4F), Mild Pain (1 - 3)    Review of Systems: Refer to HPI for pertinent positives and negatives. All other ROS reviewed and negative except as otherwise stated above.    Vital Signs Last 24 Hrs  T(F): 97.4 (22 Jun 2021 07:38), Max: 98 (21 Jun 2021 18:00)  HR: 58 (22 Jun 2021 07:38) (58 - 78)  BP: 138/86 (22 Jun 2021 07:38) (138/86 - 168/90)  RR: 14 (22 Jun 2021 07:38) (14 - 16)  SpO2: 100% (22 Jun 2021 07:38) (97% - 100%)  I&O's Summary    21 Jun 2021 07:01  -  22 Jun 2021 07:00  --------------------------------------------------------  IN: 420 mL / OUT: 520 mL / NET: -100 mL      PHYSICAL EXAM:  GENERAL: NAD, pleasant  HEAD:  Atraumatic, Normocephalic  EYES: EOMI, PERRL, conjunctiva and sclera clear  ENMT: Moist mucous membranes, Good dentition  NECK: Supple, No JVD  CHEST/LUNG: Clear to auscultation bilaterally, non-labored breathing, good air entry  HEART: RRR; S1/S2  ABDOMEN: Soft, Nontender, Nondistended; Bowel sounds present  VASCULAR: Normal pulses, Normal capillary refill  EXTREMITIES: No cyanosis, No edema LE b/l  LYMPH: No lymphadenopathy noted  SKIN: Warm, Intact  PSYCH: Normal mood and affect  NERVOUS SYSTEM:  A/O x3, Fair concentration    LABS:                        11.9   4.70  )-----------( 223      ( 22 Jun 2021 06:30 )             36.5     06-22    138  |  106  |  35  ----------------------------<  162  4.0   |  23  |  2.55    Ca    8.4      22 Jun 2021 06:30    TPro  6.1  /  Alb  2.6  /  TBili  0.2  /  DBili  x   /  AST  29  /  ALT  28  /  AlkPhos  142  06-22    eGFR if Non African American: 27 mL/min/1.73M2 (06-22-21 @ 06:30)  eGFR if African American: 31 mL/min/1.73M2 (06-22-21 @ 06:30)    POCT Blood Glucose.: 166 mg/dL (22 Jun 2021 07:36)  POCT Blood Glucose.: 189 mg/dL (21 Jun 2021 22:05)  POCT Blood Glucose.: 198 mg/dL (21 Jun 2021 17:44)    COVID-19 PCR: NotDetec (06-21-21 @ 23:30)    RADIOLOGY & ADDITIONAL TESTS: reviewed  Cultures:     utox +cocaine     Radiology:     CT: There is restricted diffusion in the right corona radiate compatible with acute infarct. Additional small areas of bilateral diffusion abnormality involving centrum semiovale, corona radiate, basal ganglia, as well as scattered peripheral locations may reflect additional multifocal subacute infarcts.  Additional patchy white matter FLAIR hyperintensity is present compatible with microvascular ischemic change.  Ventricular caliber is within normal limits for the patient's age. There is no vasogenic edema or focal mass effect.There is no extraaxial collection.  There are foci of susceptibility signal Ahmet compatible with chronic microhemorrhages in left corona radiate, basal ganglia, bilateral frontal operculum, bilateral medial temporal lobes. Some lesions appear new from prior exam.        CT: There is restricted diffusion in the right corona radiate compatible with acute infarct. Additional small areas of bilateral diffusion abnormality involving centrum semiovale, corona radiate, basal ganglia, as well as scattered peripheral locations may reflect additional multifocal subacute infarcts.  Additional patchy white matter FLAIR hyperintensity is present compatible with microvascular ischemic change.  Ventricular caliber is within normal limits for the patient's age. There is no vasogenic edema or focal mass effect.  There is no extraaxial collection.       B/L LE doppler   RIGHT: There is evidence of recanalization noted in the femoral vein at the mid segment. This finding is consistent with chronic venous disease.  However, there is no evidence of acute deep or superficial vein thrombosis in the visualized  veins of the right lower extremity.  LEFT: There is evidence of recanalization noted in the femoral vein at the mid segment. This finding is consistent with chronic venous disease.  However, there is no evidence of acute deep or superficial vein thrombosis in the visualized veins of the left  lower extremity.      Echo  Severe concentric left ventricular hypertrophy.  Normal left ventricular size.  Low-normal left ventricular ejection fraction. Mild to moderate left atrial dilatation.  Grade 2 diastolic dysfunction with increased LA pressure  Normal right ventricular function.  Right ventricular hypertrophy.      Bilateral renal and urinary bladder sonography was performed. There is no comparison imaging study. The prostate is estimated at 20 g which is normal in size.      Care Discussed with Consultants/Other Providers: yes - rehab

## 2021-06-22 NOTE — DIETITIAN INITIAL EVALUATION ADULT. - ORAL INTAKE PTA/DIET HISTORY
Pt said appetite was good prior to coming to the hospital  Pt does not follow any special diet for DM and HTN  Pt said he consumes cereal at home for his diabetes Pt reports good appetite at home  Pt does not follow any special diet for DM and HTN

## 2021-06-23 LAB
A1C WITH ESTIMATED AVERAGE GLUCOSE RESULT: 6 % — HIGH (ref 4–5.6)
ANION GAP SERPL CALC-SCNC: 7 MMOL/L — SIGNIFICANT CHANGE UP (ref 5–17)
BUN SERPL-MCNC: 34 MG/DL — HIGH (ref 7–23)
CALCIUM SERPL-MCNC: 8.7 MG/DL — SIGNIFICANT CHANGE UP (ref 8.4–10.5)
CHLORIDE SERPL-SCNC: 109 MMOL/L — HIGH (ref 96–108)
CO2 SERPL-SCNC: 26 MMOL/L — SIGNIFICANT CHANGE UP (ref 22–31)
CREAT SERPL-MCNC: 2.5 MG/DL — HIGH (ref 0.5–1.3)
ESTIMATED AVERAGE GLUCOSE: 126 MG/DL — HIGH (ref 68–114)
GLUCOSE BLDC GLUCOMTR-MCNC: 135 MG/DL — HIGH (ref 70–99)
GLUCOSE BLDC GLUCOMTR-MCNC: 135 MG/DL — HIGH (ref 70–99)
GLUCOSE BLDC GLUCOMTR-MCNC: 203 MG/DL — HIGH (ref 70–99)
GLUCOSE BLDC GLUCOMTR-MCNC: 225 MG/DL — HIGH (ref 70–99)
GLUCOSE SERPL-MCNC: 158 MG/DL — HIGH (ref 70–99)
POTASSIUM SERPL-MCNC: 4.1 MMOL/L — SIGNIFICANT CHANGE UP (ref 3.5–5.3)
POTASSIUM SERPL-SCNC: 4.1 MMOL/L — SIGNIFICANT CHANGE UP (ref 3.5–5.3)
SODIUM SERPL-SCNC: 142 MMOL/L — SIGNIFICANT CHANGE UP (ref 135–145)

## 2021-06-23 PROCEDURE — 99232 SBSQ HOSP IP/OBS MODERATE 35: CPT

## 2021-06-23 PROCEDURE — 99233 SBSQ HOSP IP/OBS HIGH 50: CPT

## 2021-06-23 RX ORDER — HYDRALAZINE HCL 50 MG
10 TABLET ORAL THREE TIMES A DAY
Refills: 0 | Status: DISCONTINUED | OUTPATIENT
Start: 2021-06-23 | End: 2021-06-25

## 2021-06-23 RX ORDER — SODIUM CHLORIDE 9 MG/ML
1000 INJECTION INTRAMUSCULAR; INTRAVENOUS; SUBCUTANEOUS
Refills: 0 | Status: DISCONTINUED | OUTPATIENT
Start: 2021-06-23 | End: 2021-06-23

## 2021-06-23 RX ORDER — POLYETHYLENE GLYCOL 3350 17 G/17G
17 POWDER, FOR SOLUTION ORAL DAILY
Refills: 0 | Status: DISCONTINUED | OUTPATIENT
Start: 2021-06-23 | End: 2021-07-23

## 2021-06-23 RX ORDER — SODIUM CHLORIDE 9 MG/ML
1000 INJECTION INTRAMUSCULAR; INTRAVENOUS; SUBCUTANEOUS
Refills: 0 | Status: DISCONTINUED | OUTPATIENT
Start: 2021-06-23 | End: 2021-06-24

## 2021-06-23 RX ADMIN — APIXABAN 2.5 MILLIGRAM(S): 2.5 TABLET, FILM COATED ORAL at 17:44

## 2021-06-23 RX ADMIN — INSULIN GLARGINE 8 UNIT(S): 100 INJECTION, SOLUTION SUBCUTANEOUS at 21:01

## 2021-06-23 RX ADMIN — Medication 50 MILLIGRAM(S): at 21:01

## 2021-06-23 RX ADMIN — Medication 1 PATCH: at 12:10

## 2021-06-23 RX ADMIN — Medication 1 PATCH: at 07:50

## 2021-06-23 RX ADMIN — GABAPENTIN 600 MILLIGRAM(S): 400 CAPSULE ORAL at 21:01

## 2021-06-23 RX ADMIN — CITALOPRAM 20 MILLIGRAM(S): 10 TABLET, FILM COATED ORAL at 12:10

## 2021-06-23 RX ADMIN — SODIUM CHLORIDE 75 MILLILITER(S): 9 INJECTION INTRAMUSCULAR; INTRAVENOUS; SUBCUTANEOUS at 16:31

## 2021-06-23 RX ADMIN — SODIUM CHLORIDE 75 MILLILITER(S): 9 INJECTION INTRAMUSCULAR; INTRAVENOUS; SUBCUTANEOUS at 21:02

## 2021-06-23 RX ADMIN — APIXABAN 2.5 MILLIGRAM(S): 2.5 TABLET, FILM COATED ORAL at 06:01

## 2021-06-23 RX ADMIN — ATORVASTATIN CALCIUM 80 MILLIGRAM(S): 80 TABLET, FILM COATED ORAL at 21:01

## 2021-06-23 RX ADMIN — Medication 1 PATCH: at 12:08

## 2021-06-23 RX ADMIN — Medication 50 MILLIGRAM(S): at 06:01

## 2021-06-23 RX ADMIN — AMLODIPINE BESYLATE 10 MILLIGRAM(S): 2.5 TABLET ORAL at 06:01

## 2021-06-23 RX ADMIN — Medication 10 MILLIGRAM(S): at 16:54

## 2021-06-23 RX ADMIN — Medication 2: at 16:49

## 2021-06-23 RX ADMIN — Medication 2: at 12:10

## 2021-06-23 RX ADMIN — Medication 50 MILLIGRAM(S): at 17:44

## 2021-06-23 RX ADMIN — SENNA PLUS 2 TABLET(S): 8.6 TABLET ORAL at 21:01

## 2021-06-23 RX ADMIN — Medication 1 PATCH: at 19:49

## 2021-06-23 NOTE — PROGRESS NOTE ADULT - ASSESSMENT
54 y/o M with PMHx of HTN, T2DM, HLD, depression, and prior CVA with no residual deficits, who presented to [facility] on 06/11/2021 with dysphagia and difficulty ambulating; found to have an acute right corona radiata infarct. Admitted for multidisciplinary rehab program.    #Acute corona radiata infarct and multiple b/l subacute infarcts  -Continue comprehensive rehab program - PT/OT/SLP per rehab team  -Pain management, bowel regimen per rehab   -Eliquis 2.5mg q12h and atorvastatin 80mg qhs    #JOSE, baseline Cr unknown  -BUN/Cr 35/2.55, per chart review BUN/Cr 24/2.11 on 6/20  -Gentle IVF, encourage PO hydration  -Avoid nephrotoxins, monitor bmp   -Nephro consult    #HLD  #HTN - uncontrolled  -Amlodipine 10mg qd, labetalol 50mg q12h  -Lipitor 80mg qhs    #T2DM, A1C 5.9 - controlled  - FS and ISS  - DC home on Metformin 500mg BID, Glipizide 5mg daily, and Sitagliptin 50mg daily  - Start lantus 8un qhs while in rehab    #Neuropathy  -Gabapentin 600mg qhs    #Chronic Tobacco use  -Nicotine 21mg/24hr  -Smoking cessation counseling provided    #Mood/Depression  -Citalopram 20mg qd    #DVT ppx - Eliquis  - neg for b/l LE DVTs at [facility]

## 2021-06-23 NOTE — PROGRESS NOTE ADULT - ASSESSMENT
MARK ANTHONY QUINTANA is a 55M with PMHx of HTN, DM, HLD, ,depression, and prior CVA with no residual deifcits, who presented to [facility] on 06/11/2021 with dysphagia and difficulty ambulating, & dysarthria found to have an acute right corona radiata infarct. Admitted for multidisciplinary rehab program.      #Comprehensive Multidisciplinary Rehab Program:  - Gait, ADL, Functional impairments  - PT/OT/ SLP 3 hours a day 5 days a week    #Acute corona radiata infarct and multiple b/l subacute infarcts  - distribution of b/l subacute infarcts suggestive of cardioembolic origin  - not a candidate for tPA or thrombectomy  - c/w Eliquis 2.5mg q12h and atorvastatin 80mg qhs    #HLD  #HTN  - c/w amlodipine 10mg daily and labetalol  50mg q12h  - c/w atorvastatin    #DM  - FS and ISS  - DC home on Metformin 500mg BID, Glipizide 5mg daily, and Sitagliptin 50mg daily    #Neuropathy  - c/w gabapentin 600mg qhs    #Tobacco use  - Nicotine 21mg/24hr    #Mood/Depression  - c/w Citalopram 20mg daily    #Sleep:  - Maintain quiet hours and low stim environment  -cont.  trazodone    #Pain:  - Tylenol PRN  - avoid sedating meds that may affect cognitive recovery    #GI/Bowel:  - Senna 2 tabs qhs and Miralax PRN    #/Bladder:  - continent      #Diet / Dysphagia:    - Diet: soft/honey  - ongoing SLP assessment  - Nutrition to follow    #Skin/ Pressure Injury Prevention:  - assessment on admission   - Turn Q2hrs in bed while awake, OOB to Chair, PT/OT/SLP     #DVT prophylaxis:  - Eliquis  - SCDs  - neg for b/l LE DVTs at [facility]    #Precautions/ Restrictions  - Falls  - Lungs: Aspiration, Incentive Spirometer    - COVID PCR: neg 6/11    --------------------------------------------  Outpatient Follow up:  Neurology - Dr. Nena Dumont on 07/22/2021 @ 11AM via telehealth  PCP  ----------------------------------------

## 2021-06-23 NOTE — PROGRESS NOTE ADULT - SUBJECTIVE AND OBJECTIVE BOX
HPI:  MARK ANTHONY QUINTANA is a 55M with PMHx of HTN, DM, HLD, ,depression, and prior CVA with no residual deficits, who presented to [facility] on 06/11/2021 with several days of dysphagia and difficulty ambulating. Patient had been experiencing symptoms since 6/8 after coming home from a dental appointment. He was brought to the hospital after a wellness check from his insurance company. In the ED, his Utox tested positive for cocaine, though patient denied use, and CT head scan showed findings c/w an acute right corona radiata infarct, along with multiple b/l subacute infarcts. Patient was managed medically, as he was not a candidate for either tPA or thrombectomy.    Patient was evaluated by PM&R and therapy for functional deficits and gait/ ADL impairments and recommended acute rehabilitation. Patient was medically optimized for discharge to [facility] on 06/21/2021. Patient was seen and examined at the bedside upon arrival.  (21 Jun 2021 15:04)      PAST MEDICAL & SURGICAL HISTORY:  Cerebrovascular accident (CVA)    Hypertension    Hyperlipidemia    Diabetes mellitus    Depression        Subjective:  Pt. asking if can have thin liquids, does not like nectar water.  Nursing states pt. does not hydrate adequately despite encouragement.  Denies pain,  sleeping at night.       VITALS  Vital Signs Last 24 Hrs  T(C): 36.4 (23 Jun 2021 07:45), Max: 36.5 (22 Jun 2021 21:50)  T(F): 97.5 (23 Jun 2021 07:45), Max: 97.7 (22 Jun 2021 21:50)  HR: 64 (23 Jun 2021 07:45) (62 - 69)  BP: 156/85 (23 Jun 2021 07:45) (148/93 - 160/97)  BP(mean): --  RR: 15 (23 Jun 2021 07:45) (15 - 16)  SpO2: 98% (23 Jun 2021 07:45) (98% - 99%)    REVIEW OF SYMPTOMS  Neurological deficits-- dysphagia, dysarthria    PHYSICAL EXAM  Constitutional - NAD, Comfortable  HEENT - NCAT, EOMI  Neck - Supple, No limited ROM  Chest - CTAB   Cardio - warm and well perfused, RRR, no murmur  Abdomen -  Soft, NTND, (+) BS  Extremities - No peripheral edema, No calf tenderness   Neurologic Exam:                    Cognitive -             Orientation: A&O x4            Attention:  able to spell "WORLD" forwards, but not backwards; unable to perform serial 7s, able to state days of week backward            Memory: recall 0/3 spontaneously after few minutes     Speech - (+) mild dysarthria,  No aphasia     Cranial Nerves - Right pupil sluggish, left pupil reactive, Visual fields slightly impaired on right, flattening of right NLF, Tongue midline, EOMI, Shoulder shrug intact, +dysarthria, +dysphagia     Motor -                      LEFT    UE - ShAB 5/5, EF 5/5, EE 4/5, WE 5/5,  WNL                    RIGHT UE - ShAB 5/5, EF 5/5, EE 4/5, WE 5/5,  WNL                    LEFT    LE - HF 5/5, KE 5/5, DF 5/5, PF 5/5                    RIGHT LE - HF 4/5, KE 4/5, DF 4/5, PF 4/5        Sensory - diminished sensation in dorsum and plantar aspect of right foot     Reflexes - neg Babinski's b/l     Coordination - FTN intact & HTS slightly impaired on right     OculoVestibular -  No nystagmus  Psychiatric - mood stable, appropriate        RECENT LABS                        11.9   4.70  )-----------( 223      ( 22 Jun 2021 06:30 )             36.5     06-23    142  |  109<H>  |  34<H>  ----------------------------<  158<H>  4.1   |  26  |  2.50<H>    Ca    8.7      23 Jun 2021 07:35    TPro  6.1  /  Alb  2.6<L>  /  TBili  0.2  /  DBili  x   /  AST  29  /  ALT  28  /  AlkPhos  142<H>  06-22            RADIOLOGY/OTHER RESULTS      MEDICATIONS  (STANDING):  amLODIPine   Tablet 10 milliGRAM(s) Oral daily  apixaban 2.5 milliGRAM(s) Oral every 12 hours  atorvastatin 80 milliGRAM(s) Oral at bedtime  citalopram 20 milliGRAM(s) Oral daily  dextrose 40% Gel 15 Gram(s) Oral once  dextrose 5%. 1000 milliLiter(s) (50 mL/Hr) IV Continuous <Continuous>  dextrose 5%. 1000 milliLiter(s) (100 mL/Hr) IV Continuous <Continuous>  dextrose 50% Injectable 25 Gram(s) IV Push once  dextrose 50% Injectable 12.5 Gram(s) IV Push once  dextrose 50% Injectable 25 Gram(s) IV Push once  gabapentin 600 milliGRAM(s) Oral at bedtime  glucagon  Injectable 1 milliGRAM(s) IntraMuscular once  insulin glargine Injectable (LANTUS) 8 Unit(s) SubCutaneous at bedtime  insulin lispro (ADMELOG) corrective regimen sliding scale   SubCutaneous three times a day before meals  insulin lispro (ADMELOG) corrective regimen sliding scale   SubCutaneous at bedtime  labetalol 50 milliGRAM(s) Oral two times a day  nicotine - 21 mG/24Hr(s) Patch 1 patch Transdermal daily  senna 2 Tablet(s) Oral at bedtime  sodium chloride 0.9%. 1000 milliLiter(s) (75 mL/Hr) IV Continuous <Continuous>  sodium chloride 0.9%. 1000 milliLiter(s) (75 mL/Hr) IV Continuous <Continuous>  traZODone 50 milliGRAM(s) Oral at bedtime    MEDICATIONS  (PRN):  acetaminophen    Suspension .. 650 milliGRAM(s) Oral every 6 hours PRN Temp greater or equal to 38C (100.4F), Mild Pain (1 - 3)

## 2021-06-23 NOTE — PROGRESS NOTE ADULT - SUBJECTIVE AND OBJECTIVE BOX
Patient is a 55y old  Male who presents with a chief complaint of Acute right corona radiata CVA (22 Jun 2021 11:44)    Patient seen and examined at bedside. drowsy. no acute complaints.    ALLERGIES:  No Known Allergies    MEDICATIONS  (STANDING):  amLODIPine   Tablet 10 milliGRAM(s) Oral daily  apixaban 2.5 milliGRAM(s) Oral every 12 hours  atorvastatin 80 milliGRAM(s) Oral at bedtime  citalopram 20 milliGRAM(s) Oral daily  dextrose 40% Gel 15 Gram(s) Oral once  dextrose 5%. 1000 milliLiter(s) (50 mL/Hr) IV Continuous <Continuous>  dextrose 5%. 1000 milliLiter(s) (100 mL/Hr) IV Continuous <Continuous>  dextrose 50% Injectable 25 Gram(s) IV Push once  dextrose 50% Injectable 12.5 Gram(s) IV Push once  dextrose 50% Injectable 25 Gram(s) IV Push once  gabapentin 600 milliGRAM(s) Oral at bedtime  glucagon  Injectable 1 milliGRAM(s) IntraMuscular once  insulin glargine Injectable (LANTUS) 8 Unit(s) SubCutaneous at bedtime  insulin lispro (ADMELOG) corrective regimen sliding scale   SubCutaneous three times a day before meals  insulin lispro (ADMELOG) corrective regimen sliding scale   SubCutaneous at bedtime  labetalol 50 milliGRAM(s) Oral two times a day  nicotine - 21 mG/24Hr(s) Patch 1 patch Transdermal daily  senna 2 Tablet(s) Oral at bedtime  sodium chloride 0.9%. 1000 milliLiter(s) (75 mL/Hr) IV Continuous <Continuous>  traZODone 50 milliGRAM(s) Oral at bedtime    MEDICATIONS  (PRN):  acetaminophen    Suspension .. 650 milliGRAM(s) Oral every 6 hours PRN Temp greater or equal to 38C (100.4F), Mild Pain (1 - 3)    Vital Signs Last 24 Hrs  T(F): 97.5 (23 Jun 2021 07:45), Max: 97.7 (22 Jun 2021 21:50)  HR: 64 (23 Jun 2021 07:45) (62 - 69)  BP: 156/85 (23 Jun 2021 07:45) (148/93 - 160/97)  RR: 15 (23 Jun 2021 07:45) (15 - 16)  SpO2: 98% (23 Jun 2021 07:45) (98% - 99%)  I&O's Summary    22 Jun 2021 07:01  -  23 Jun 2021 07:00  --------------------------------------------------------  IN: 750 mL / OUT: 1800 mL / NET: -1050 mL      BMI (kg/m2): 29.5 (06-21-21 @ 21:35)    PHYSICAL EXAM:  General: NAD, drowsy  ENT: MMM, no scleral icterus  Neck: Supple, No JVD  Lungs: Respirations unlabored. CTA b/l, diminished at bases  Cardio: RRR, S1/S2  Abdomen: Soft, Nontender, Nondistended; Bowel sounds present  Extremities: No calf tenderness, No pitting edema LE b/l    LABS:                        11.9   4.70  )-----------( 223      ( 22 Jun 2021 06:30 )             36.5       06-23    142  |  109  |  34  ----------------------------<  158  4.1   |  26  |  2.50    Ca    8.7      23 Jun 2021 07:35    TPro  6.1  /  Alb  2.6  /  TBili  0.2  /  DBili  x   /  AST  29  /  ALT  28  /  AlkPhos  142  06-22     eGFR if Non African American: 28 mL/min/1.73M2 (06-23-21 @ 07:35)  eGFR if African American: 32 mL/min/1.73M2 (06-23-21 @ 07:35)    POCT Blood Glucose.: 225 mg/dL (23 Jun 2021 11:41)  POCT Blood Glucose.: 135 mg/dL (23 Jun 2021 08:00)  POCT Blood Glucose.: 198 mg/dL (22 Jun 2021 22:17)  POCT Blood Glucose.: 194 mg/dL (22 Jun 2021 16:37)    COVID-19 PCR: NotDetec (06-21-21 @ 23:30)    RADIOLOGY & ADDITIONAL TESTS: reviewed    Care Discussed with Consultants/Other Providers: yes - rehab

## 2021-06-24 LAB
ALBUMIN SERPL ELPH-MCNC: 2.9 G/DL — LOW (ref 3.3–5)
ALP SERPL-CCNC: 157 U/L — HIGH (ref 40–120)
ALT FLD-CCNC: 26 U/L — SIGNIFICANT CHANGE UP (ref 10–45)
ANION GAP SERPL CALC-SCNC: 6 MMOL/L — SIGNIFICANT CHANGE UP (ref 5–17)
APPEARANCE UR: CLEAR — SIGNIFICANT CHANGE UP
AST SERPL-CCNC: 35 U/L — SIGNIFICANT CHANGE UP (ref 10–40)
BACTERIA # UR AUTO: NEGATIVE /HPF — SIGNIFICANT CHANGE UP
BILIRUB DIRECT SERPL-MCNC: 0.1 MG/DL — SIGNIFICANT CHANGE UP (ref 0–0.2)
BILIRUB INDIRECT FLD-MCNC: 0.3 MG/DL — SIGNIFICANT CHANGE UP (ref 0.2–1)
BILIRUB SERPL-MCNC: 0.4 MG/DL — SIGNIFICANT CHANGE UP (ref 0.2–1.2)
BILIRUB UR-MCNC: NEGATIVE — SIGNIFICANT CHANGE UP
BUN SERPL-MCNC: 34 MG/DL — HIGH (ref 7–23)
CALCIUM SERPL-MCNC: 9.2 MG/DL — SIGNIFICANT CHANGE UP (ref 8.4–10.5)
CHLORIDE SERPL-SCNC: 109 MMOL/L — HIGH (ref 96–108)
CO2 SERPL-SCNC: 29 MMOL/L — SIGNIFICANT CHANGE UP (ref 22–31)
COLOR SPEC: YELLOW — SIGNIFICANT CHANGE UP
CREAT SERPL-MCNC: 2.52 MG/DL — HIGH (ref 0.5–1.3)
DIFF PNL FLD: ABNORMAL
EPI CELLS # UR: SIGNIFICANT CHANGE UP
GLUCOSE BLDC GLUCOMTR-MCNC: 114 MG/DL — HIGH (ref 70–99)
GLUCOSE BLDC GLUCOMTR-MCNC: 140 MG/DL — HIGH (ref 70–99)
GLUCOSE BLDC GLUCOMTR-MCNC: 174 MG/DL — HIGH (ref 70–99)
GLUCOSE BLDC GLUCOMTR-MCNC: 218 MG/DL — HIGH (ref 70–99)
GLUCOSE SERPL-MCNC: 106 MG/DL — HIGH (ref 70–99)
GLUCOSE UR QL: NEGATIVE — SIGNIFICANT CHANGE UP
HCT VFR BLD CALC: 41 % — SIGNIFICANT CHANGE UP (ref 39–50)
HGB BLD-MCNC: 13.2 G/DL — SIGNIFICANT CHANGE UP (ref 13–17)
KETONES UR-MCNC: NEGATIVE — SIGNIFICANT CHANGE UP
LEUKOCYTE ESTERASE UR-ACNC: NEGATIVE — SIGNIFICANT CHANGE UP
MCHC RBC-ENTMCNC: 30 PG — SIGNIFICANT CHANGE UP (ref 27–34)
MCHC RBC-ENTMCNC: 32.2 GM/DL — SIGNIFICANT CHANGE UP (ref 32–36)
MCV RBC AUTO: 93.2 FL — SIGNIFICANT CHANGE UP (ref 80–100)
NITRITE UR-MCNC: NEGATIVE — SIGNIFICANT CHANGE UP
NRBC # BLD: 0 /100 WBCS — SIGNIFICANT CHANGE UP (ref 0–0)
PH UR: 5 — SIGNIFICANT CHANGE UP (ref 5–8)
PLATELET # BLD AUTO: 245 K/UL — SIGNIFICANT CHANGE UP (ref 150–400)
POTASSIUM SERPL-MCNC: 4.4 MMOL/L — SIGNIFICANT CHANGE UP (ref 3.5–5.3)
POTASSIUM SERPL-SCNC: 4.4 MMOL/L — SIGNIFICANT CHANGE UP (ref 3.5–5.3)
PROT SERPL-MCNC: 7.2 G/DL — SIGNIFICANT CHANGE UP (ref 6–8.3)
PROT UR-MCNC: 500 MG/DL
RBC # BLD: 4.4 M/UL — SIGNIFICANT CHANGE UP (ref 4.2–5.8)
RBC # FLD: 11.3 % — SIGNIFICANT CHANGE UP (ref 10.3–14.5)
RBC CASTS # UR COMP ASSIST: SIGNIFICANT CHANGE UP /HPF (ref 0–4)
SODIUM SERPL-SCNC: 144 MMOL/L — SIGNIFICANT CHANGE UP (ref 135–145)
SP GR SPEC: 1.02 — SIGNIFICANT CHANGE UP (ref 1.01–1.02)
UROBILINOGEN FLD QL: NEGATIVE — SIGNIFICANT CHANGE UP
WBC # BLD: 4.82 K/UL — SIGNIFICANT CHANGE UP (ref 3.8–10.5)
WBC # FLD AUTO: 4.82 K/UL — SIGNIFICANT CHANGE UP (ref 3.8–10.5)
WBC UR QL: NEGATIVE /HPF — SIGNIFICANT CHANGE UP (ref 0–5)

## 2021-06-24 PROCEDURE — 99232 SBSQ HOSP IP/OBS MODERATE 35: CPT

## 2021-06-24 PROCEDURE — 93306 TTE W/DOPPLER COMPLETE: CPT | Mod: 26

## 2021-06-24 PROCEDURE — 99233 SBSQ HOSP IP/OBS HIGH 50: CPT

## 2021-06-24 RX ORDER — SODIUM CHLORIDE 9 MG/ML
1000 INJECTION, SOLUTION INTRAVENOUS
Refills: 0 | Status: DISCONTINUED | OUTPATIENT
Start: 2021-06-24 | End: 2021-06-24

## 2021-06-24 RX ORDER — SODIUM CHLORIDE 9 MG/ML
1000 INJECTION INTRAMUSCULAR; INTRAVENOUS; SUBCUTANEOUS
Refills: 0 | Status: DISCONTINUED | OUTPATIENT
Start: 2021-06-24 | End: 2021-06-24

## 2021-06-24 RX ORDER — SODIUM CHLORIDE 9 MG/ML
1000 INJECTION, SOLUTION INTRAVENOUS
Refills: 0 | Status: DISCONTINUED | OUTPATIENT
Start: 2021-06-24 | End: 2021-06-25

## 2021-06-24 RX ORDER — SODIUM CHLORIDE 9 MG/ML
1000 INJECTION, SOLUTION INTRAVENOUS
Refills: 0 | Status: COMPLETED | OUTPATIENT
Start: 2021-06-24 | End: 2021-06-24

## 2021-06-24 RX ORDER — HYDRALAZINE HCL 50 MG
10 TABLET ORAL ONCE
Refills: 0 | Status: COMPLETED | OUTPATIENT
Start: 2021-06-24 | End: 2021-06-24

## 2021-06-24 RX ADMIN — Medication 1 PATCH: at 06:52

## 2021-06-24 RX ADMIN — ATORVASTATIN CALCIUM 80 MILLIGRAM(S): 80 TABLET, FILM COATED ORAL at 21:21

## 2021-06-24 RX ADMIN — AMLODIPINE BESYLATE 10 MILLIGRAM(S): 2.5 TABLET ORAL at 05:42

## 2021-06-24 RX ADMIN — Medication 50 MILLIGRAM(S): at 17:17

## 2021-06-24 RX ADMIN — SODIUM CHLORIDE 50 MILLILITER(S): 9 INJECTION, SOLUTION INTRAVENOUS at 23:17

## 2021-06-24 RX ADMIN — Medication 10 MILLIGRAM(S): at 05:42

## 2021-06-24 RX ADMIN — SODIUM CHLORIDE 100 MILLILITER(S): 9 INJECTION, SOLUTION INTRAVENOUS at 13:36

## 2021-06-24 RX ADMIN — Medication 50 MILLIGRAM(S): at 21:22

## 2021-06-24 RX ADMIN — POLYETHYLENE GLYCOL 3350 17 GRAM(S): 17 POWDER, FOR SOLUTION ORAL at 05:42

## 2021-06-24 RX ADMIN — APIXABAN 2.5 MILLIGRAM(S): 2.5 TABLET, FILM COATED ORAL at 17:17

## 2021-06-24 RX ADMIN — Medication 10 MILLIGRAM(S): at 13:18

## 2021-06-24 RX ADMIN — Medication 2: at 11:38

## 2021-06-24 RX ADMIN — APIXABAN 2.5 MILLIGRAM(S): 2.5 TABLET, FILM COATED ORAL at 05:42

## 2021-06-24 RX ADMIN — CITALOPRAM 20 MILLIGRAM(S): 10 TABLET, FILM COATED ORAL at 11:37

## 2021-06-24 RX ADMIN — Medication 1 PATCH: at 11:33

## 2021-06-24 RX ADMIN — Medication 1 PATCH: at 19:49

## 2021-06-24 RX ADMIN — Medication 10 MILLIGRAM(S): at 22:49

## 2021-06-24 RX ADMIN — SENNA PLUS 2 TABLET(S): 8.6 TABLET ORAL at 21:21

## 2021-06-24 RX ADMIN — Medication 50 MILLIGRAM(S): at 05:42

## 2021-06-24 RX ADMIN — Medication 10 MILLIGRAM(S): at 21:21

## 2021-06-24 RX ADMIN — GABAPENTIN 600 MILLIGRAM(S): 400 CAPSULE ORAL at 21:21

## 2021-06-24 RX ADMIN — SODIUM CHLORIDE 50 MILLILITER(S): 9 INJECTION, SOLUTION INTRAVENOUS at 22:49

## 2021-06-24 RX ADMIN — Medication 1 PATCH: at 11:38

## 2021-06-24 RX ADMIN — INSULIN GLARGINE 8 UNIT(S): 100 INJECTION, SOLUTION SUBCUTANEOUS at 21:22

## 2021-06-24 NOTE — PROGRESS NOTE ADULT - SUBJECTIVE AND OBJECTIVE BOX
DAILY PROGRESS NOTE:  HPI:  MARK ANTHONY QUINTANA is a 55M with PMHx of HTN, DM, HLD, ,depression, and prior CVA with no residual deficits, who presented to [facility] on 2021 with several days of dysphagia and difficulty ambulating. Patient had been experiencing symptoms since  after coming home from a dental appointment. He was brought to the hospital after a wellness check from his insurance company. In the ED, his Utox tested positive for cocaine, though patient denied use, and CT head scan showed findings c/w an acute right corona radiata infarct, along with multiple b/l subacute infarcts. Patient was managed medically, as he was not a candidate for either tPA or thrombectomy.    Patient was evaluated by PM&R and therapy for functional deficits and gait/ ADL impairments and recommended acute rehabilitation. Patient was medically optimized for discharge to [facility] on 2021. Patient was seen and examined at the bedside upon arrival.  (2021 15:04)      Subjective:  Patient was seen and examined at the bedside this AM. No acute overnight events. Patient was napping when examiners entered the room; states he slept well, but still sleepy. Reports no complaints this morning; states therapy going well. Encouraged PO hydration.     ROS:  No complaints this AM. Has yet to have BM while at .      Physical Exam:  Vital Signs Last 24 Hrs  T(C): 36.7 (2021 07:15), Max: 36.7 (2021 20:36)  T(F): 98.1 (2021 07:15), Max: 98.1 (2021 07:15)  HR: 70 (2021 07:15) (58 - 70)  BP: 146/80 (2021 07:15) (146/80 - 161/98)  BP(mean): --  RR: 14 (2021 07:15) (14 - 16)  SpO2: 99% (2021 07:15) (97% - 99%)      21 @ 07:01  -  21 @ 07:00  --------------------------------------------------------  IN: 300 mL / OUT: 750 mL / NET: -450 mL    21 @ 07:01  -  21 @ 11:30  --------------------------------------------------------  IN: 0 mL / OUT: 1 mL / NET: -1 mL      Constitutional - NAD, Comfortable  HEENT - NCAT  Chest - CTAB   Cardio - warm and well perfused, RRR, no murmur  Abdomen -  Soft, NTND, (+) BS  Extremities - No peripheral edema, No calf tenderness   Neurologic Exam:                    Cognitive -             Orientation: A&O x4     Speech - (+) mild dysarthria,  No aphasia     Cranial Nerves - Right pupil sluggish, left pupil reactive, Visual fields slightly impaired on right, flattening of right NLF, EOMI, Shoulder shrug intact, +dysarthria, +dysphagia     Motor -                      LEFT    UE - ShAB 5/5, EF 5/5, EE 4/5, WE 5/5,  WNL                    RIGHT UE - ShAB 5/5, EF 5/5, EE 4/5, WE 5/5,  WNL                    LEFT    LE - HF 5/5, KE 5/5, DF 5/5, PF 5/5                    RIGHT LE - HF 4/5, KE 4/5, DF 4/5, PF 4/5        Sensory - diminished sensation in dorsum and plantar aspect of right foot     Coordination - FTN intact & HTS slightly impaired on right  Psychiatric - mood stable, appropriate      Recent Labs/Imagin.2   4.82  )-----------( 245      ( 2021 07:17 )             41.0         144  |  109<H>  |  34<H>  ----------------------------<  106<H>  4.4   |  29  |  2.52<H>    Ca    9.2      2021 07:17  TPro  7.2  /  Alb  2.9<L>  /  TBili  0.4  /  DBili  0.1  /  AST  35  /  ALT  26  /  AlkPhos  157<H>      POCT Blood Glucose.: 114 mg/dL (21 @ 08:07)  POCT Blood Glucose.: 135 mg/dL (21 @ 20:52)  POCT Blood Glucose.: 203 mg/dL (21 @ 16:33)  POCT Blood Glucose.: 225 mg/dL (21 @ 11:41)      Medications:  acetaminophen    Suspension .. 650 milliGRAM(s) Oral every 6 hours PRN  amLODIPine   Tablet 10 milliGRAM(s) Oral daily  apixaban 2.5 milliGRAM(s) Oral every 12 hours  atorvastatin 80 milliGRAM(s) Oral at bedtime  citalopram 20 milliGRAM(s) Oral daily  dextrose 40% Gel 15 Gram(s) Oral once  dextrose 5%. 1000 milliLiter(s) IV Continuous <Continuous>  dextrose 5%. 1000 milliLiter(s) IV Continuous <Continuous>  dextrose 50% Injectable 25 Gram(s) IV Push once  dextrose 50% Injectable 12.5 Gram(s) IV Push once  dextrose 50% Injectable 25 Gram(s) IV Push once  gabapentin 600 milliGRAM(s) Oral at bedtime  glucagon  Injectable 1 milliGRAM(s) IntraMuscular once  hydrALAZINE 10 milliGRAM(s) Oral three times a day  insulin glargine Injectable (LANTUS) 8 Unit(s) SubCutaneous at bedtime  insulin lispro (ADMELOG) corrective regimen sliding scale   SubCutaneous three times a day before meals  insulin lispro (ADMELOG) corrective regimen sliding scale   SubCutaneous at bedtime  labetalol 50 milliGRAM(s) Oral two times a day  nicotine - 21 mG/24Hr(s) Patch 1 patch Transdermal daily  polyethylene glycol 3350 17 Gram(s) Oral daily  senna 2 Tablet(s) Oral at bedtime  sodium chloride 0.9%. 1000 milliLiter(s) IV Continuous <Continuous>  sodium chloride 0.9%. 1000 milliLiter(s) IV Continuous <Continuous>  traZODone 50 milliGRAM(s) Oral at bedtime DAILY PROGRESS NOTE:  HPI:  MARK ANTOHNY QUINTANA is a 55M with PMHx of HTN, DM, HLD, ,depression, and prior CVA with no residual deficits, who presented to [facility] on 2021 with several days of dysphagia and difficulty ambulating. Patient had been experiencing symptoms since  after coming home from a dental appointment. He was brought to the hospital after a wellness check from his insurance company. In the ED, his Utox tested positive for cocaine, though patient denied use, and CT head scan showed findings c/w an acute right corona radiata infarct, along with multiple b/l subacute infarcts. Patient was managed medically, as he was not a candidate for either tPA or thrombectomy.    Patient was evaluated by PM&R and therapy for functional deficits and gait/ ADL impairments and recommended acute rehabilitation. Patient was medically optimized for discharge to [facility] on 2021. Patient was seen and examined at the bedside upon arrival.  (2021 15:04)      Subjective:  Patient was seen and examined at the bedside this AM. No acute overnight events. Patient was napping when examiners entered the room; states he slept well, but still sleepy. Reports no complaints this morning; states therapy going well. Encouraged PO hydration.   Pt. slept at 1am as per nursing     ROS:  No complaints this AM. Has yet to have BM while at .      Physical Exam:  Vital Signs Last 24 Hrs  T(C): 36.7 (2021 07:15), Max: 36.7 (2021 20:36)  T(F): 98.1 (2021 07:15), Max: 98.1 (2021 07:15)  HR: 70 (2021 07:15) (58 - 70)  BP: 146/80 (2021 07:15) (146/80 - 161/98)  BP(mean): --  RR: 14 (2021 07:15) (14 - 16)  SpO2: 99% (2021 07:15) (97% - 99%)      21 @ 07:01  -  21 @ 07:00  --------------------------------------------------------  IN: 300 mL / OUT: 750 mL / NET: -450 mL    21 @ 07:01  -  21 @ 11:30  --------------------------------------------------------  IN: 0 mL / OUT: 1 mL / NET: -1 mL      Constitutional - NAD, Comfortable  HEENT - NCAT  Chest - CTAB   Cardio - warm and well perfused, RRR, no murmur  Abdomen -  Soft, NTND, (+) BS  Extremities - No peripheral edema, No calf tenderness   Neurologic Exam:                    Cognitive -             Orientation: A&O x4     Speech - (+) mild dysarthria,  No aphasia     Cranial Nerves - Right pupil sluggish, left pupil reactive, Visual fields slightly impaired on right, flattening of right NLF, EOMI, Shoulder shrug intact, +dysarthria, +dysphagia     Motor -                      LEFT    UE - ShAB 5/5, EF 5/5, EE 4/5, WE 5/5,  WNL                    RIGHT UE - ShAB 5/5, EF 5/5, EE 4/5, WE 5/5,  WNL                    LEFT    LE - HF 5/5, KE 5/5, DF 5/5, PF 5/5                    RIGHT LE - HF 4/5, KE 4/5, DF 4/5, PF 4/5        Sensory - diminished sensation in dorsum and plantar aspect of right foot     Coordination - FTN intact & HTS slightly impaired on right  Psychiatric - mood stable, appropriate      Recent Labs/Imagin.2   4.82  )-----------( 245      ( 2021 07:17 )             41.0         144  |  109<H>  |  34<H>  ----------------------------<  106<H>  4.4   |  29  |  2.52<H>    Ca    9.2      2021 07:17  TPro  7.2  /  Alb  2.9<L>  /  TBili  0.4  /  DBili  0.1  /  AST  35  /  ALT  26  /  AlkPhos  157<H>      POCT Blood Glucose.: 114 mg/dL (21 @ 08:07)  POCT Blood Glucose.: 135 mg/dL (21 @ 20:52)  POCT Blood Glucose.: 203 mg/dL (21 @ 16:33)  POCT Blood Glucose.: 225 mg/dL (21 @ 11:41)      Medications:  acetaminophen    Suspension .. 650 milliGRAM(s) Oral every 6 hours PRN  amLODIPine   Tablet 10 milliGRAM(s) Oral daily  apixaban 2.5 milliGRAM(s) Oral every 12 hours  atorvastatin 80 milliGRAM(s) Oral at bedtime  citalopram 20 milliGRAM(s) Oral daily  dextrose 40% Gel 15 Gram(s) Oral once  dextrose 5%. 1000 milliLiter(s) IV Continuous <Continuous>  dextrose 5%. 1000 milliLiter(s) IV Continuous <Continuous>  dextrose 50% Injectable 25 Gram(s) IV Push once  dextrose 50% Injectable 12.5 Gram(s) IV Push once  dextrose 50% Injectable 25 Gram(s) IV Push once  gabapentin 600 milliGRAM(s) Oral at bedtime  glucagon  Injectable 1 milliGRAM(s) IntraMuscular once  hydrALAZINE 10 milliGRAM(s) Oral three times a day  insulin glargine Injectable (LANTUS) 8 Unit(s) SubCutaneous at bedtime  insulin lispro (ADMELOG) corrective regimen sliding scale   SubCutaneous three times a day before meals  insulin lispro (ADMELOG) corrective regimen sliding scale   SubCutaneous at bedtime  labetalol 50 milliGRAM(s) Oral two times a day  nicotine - 21 mG/24Hr(s) Patch 1 patch Transdermal daily  polyethylene glycol 3350 17 Gram(s) Oral daily  senna 2 Tablet(s) Oral at bedtime  sodium chloride 0.9%. 1000 milliLiter(s) IV Continuous <Continuous>  sodium chloride 0.9%. 1000 milliLiter(s) IV Continuous <Continuous>  traZODone 50 milliGRAM(s) Oral at bedtime

## 2021-06-24 NOTE — PROGRESS NOTE ADULT - ASSESSMENT
MARK ANTHONY QUINTANA is a 55M with PMHx of HTN, DM, HLD, ,depression, and prior CVA with no residual deifcits, who presented to [facility] on 06/11/2021 with dysphagia and difficulty ambulating, & dysarthria found to have an acute right corona radiata infarct. Admitted for multidisciplinary rehab program.      #Comprehensive Multidisciplinary Rehab Program:  - Gait, ADL, Functional impairments  - PT/OT/ SLP 3 hours a day 5 days a week    #Acute corona radiata infarct and multiple b/l subacute infarcts  - distribution of b/l subacute infarcts suggestive of cardioembolic origin  - not a candidate for tPA or thrombectomy  - c/w Eliquis 2.5mg q12h and atorvastatin 80mg qhs    #HLD  #HTN  - c/w amlodipine 10mg daily and labetalol  50mg q12h  - c/w atorvastatin    #DM  - FS and ISS  - DC home on Metformin 500mg BID, Glipizide 5mg daily, and Sitagliptin 50mg daily    #Neuropathy  - c/w gabapentin 600mg qhs    #Tobacco use  - Nicotine 21mg/24hr    #Mood/Depression  - c/w Citalopram 20mg daily    #Sleep:  - Maintain quiet hours and low stim environment  -cont.  trazodone    #Pain:  - Tylenol PRN  - avoid sedating meds that may affect cognitive recovery    #GI/Bowel:  - Senna 2 tabs qhs and Miralax PRN    #/Bladder:  - continent      #Diet / Dysphagia:    - Diet: soft/honey  - ongoing SLP assessment  - Nutrition to follow    #Skin/ Pressure Injury Prevention:  - assessment on admission   - Turn Q2hrs in bed while awake, OOB to Chair, PT/OT/SLP     #DVT prophylaxis:  - Eliquis  - SCDs  - neg for b/l LE DVTs at [facility]    #Precautions/ Restrictions  - Falls  - Lungs: Aspiration, Incentive Spirometer    - COVID PCR: neg 6/11    #Dispo: IDR 06/24/2021  - OT: setup/supervision eating/EBD; min assist grooming/toilet transfer/LBD; mod assist bathing  - PT: min assist transfer; mod assist ambulation with RW and WC follow; mod assist 4 steps with 2 hand rails  - SLP: mod-severe cog deficits; (+) dysarthria and dysphagia; soft/nectar diet; MBS this week  - Goals: Oralia transfers, supervision ambulation 150', supervision 12 steps, oralia eating/grooming/UBD, supervision LBD/bathing/functional transfers  - TDD: [facility] 7/9    #LABs  - [facility] 6/28    --------------------------------------------  Outpatient Follow up:  Neurology - Dr. Nena Dumont on 07/22/2021 @ 11AM via telehealth  PCP  ---------------------------------------- MARK ANTHONY QUINTANA is a 55M with PMHx of HTN, DM, HLD, ,depression, and prior CVA with no residual deifcits, who presented to [facility] on 06/11/2021 with dysphagia and difficulty ambulating, & dysarthria found to have an acute right corona radiata infarct. Admitted for multidisciplinary rehab program.      #Comprehensive Multidisciplinary Rehab Program:  - Gait, ADL, Functional impairments  - PT/OT/ SLP 3 hours a day 5 days a week    #Acute corona radiata infarct and multiple b/l subacute infarcts  - distribution of b/l subacute infarcts suggestive of cardioembolic origin  - not a candidate for tPA or thrombectomy  - c/w Eliquis 2.5mg q12h and atorvastatin 80mg qhs    #HLD  #HTN  - c/w amlodipine 10mg daily and labetalol  50mg q12h  - c/w atorvastatin    #DM  - FS and ISS  - DC home on Metformin 500mg BID, Glipizide 5mg daily, and Sitagliptin 50mg daily    #Neuropathy  - c/w gabapentin 600mg qhs    #Tobacco use  - Nicotine 21mg/24hr    #Mood/Depression  - c/w Citalopram 20mg daily    #Sleep:  - Maintain quiet hours and low stim environment  -cont.  trazodone    #Pain:  - Tylenol PRN  - avoid sedating meds that may affect cognitive recovery    #GI/Bowel:  - Senna 2 tabs qhs and Miralax PRN    #/Bladder:  - continent      #Diet / Dysphagia:    - Diet: soft/honey  - ongoing SLP assessment  - Nutrition to follow    #Skin/ Pressure Injury Prevention:  - assessment on admission   - Turn Q2hrs in bed while awake, OOB to Chair, PT/OT/SLP     #DVT prophylaxis:  - Eliquis  - SCDs  - neg for b/l LE DVTs at [facility]    #Precautions/ Restrictions  - Falls  - Lungs: Aspiration, Incentive Spirometer    - COVID PCR: neg 6/11    #Dispo: IDR 06/24/2021  - OT: setup/supervision eating/UBD; min assist grooming/toilet transfer/LBD; mod assist bathing  - PT: min assist transfer; mod assist ambulation with RW and WC follow; mod assist 4 steps with 2 hand rails  - SLP: mod-severe cog deficits; (+) dysarthria and dysphagia; soft/nectar diet; MBS this week  - Goals: Oralia transfers, supervision ambulation 150', supervision 12 steps, oralia eating/grooming/UBD, supervision LBD/bathing/functional transfers  - TDD: [facility] 7/9    #LABs  - [facility] 6/28    --------------------------------------------  Outpatient Follow up:  Neurology - Dr. Nena Dumont on 07/22/2021 @ 11AM via telehealth  PCP  ----------------------------------------

## 2021-06-24 NOTE — PROGRESS NOTE ADULT - SUBJECTIVE AND OBJECTIVE BOX
Medicine Progress Note    Patient is a 55y old  Male who presents with a chief complaint of Acute right corona radiata CVA (24 Jun 2021 11:30)      SUBJECTIVE / OVERNIGHT EVENTS:  seen and examined  Chart reviewed  No overnight events  Limb weakness improving with therapy  BM+  No pain  No complaints    creatinine remains elevated    ADDITIONAL REVIEW OF SYSTEMS:  no fever/chills/CP/sob/palpitation/dizziness/ abd pain/nausea/vomiting/diarrhea/constipation/headaches. all other ROS neg    MEDICATIONS  (STANDING):  amLODIPine   Tablet 10 milliGRAM(s) Oral daily  apixaban 2.5 milliGRAM(s) Oral every 12 hours  atorvastatin 80 milliGRAM(s) Oral at bedtime  citalopram 20 milliGRAM(s) Oral daily  dextrose 40% Gel 15 Gram(s) Oral once  dextrose 5%. 1000 milliLiter(s) (50 mL/Hr) IV Continuous <Continuous>  dextrose 5%. 1000 milliLiter(s) (100 mL/Hr) IV Continuous <Continuous>  dextrose 50% Injectable 25 Gram(s) IV Push once  dextrose 50% Injectable 12.5 Gram(s) IV Push once  dextrose 50% Injectable 25 Gram(s) IV Push once  gabapentin 600 milliGRAM(s) Oral at bedtime  glucagon  Injectable 1 milliGRAM(s) IntraMuscular once  hydrALAZINE 10 milliGRAM(s) Oral three times a day  insulin glargine Injectable (LANTUS) 8 Unit(s) SubCutaneous at bedtime  insulin lispro (ADMELOG) corrective regimen sliding scale   SubCutaneous three times a day before meals  insulin lispro (ADMELOG) corrective regimen sliding scale   SubCutaneous at bedtime  labetalol 50 milliGRAM(s) Oral two times a day  nicotine - 21 mG/24Hr(s) Patch 1 patch Transdermal daily  polyethylene glycol 3350 17 Gram(s) Oral daily  senna 2 Tablet(s) Oral at bedtime  sodium chloride 0.9%. 1000 milliLiter(s) (75 mL/Hr) IV Continuous <Continuous>  sodium chloride 0.9%. 1000 milliLiter(s) (75 mL/Hr) IV Continuous <Continuous>  traZODone 50 milliGRAM(s) Oral at bedtime    MEDICATIONS  (PRN):  acetaminophen    Suspension .. 650 milliGRAM(s) Oral every 6 hours PRN Temp greater or equal to 38C (100.4F), Mild Pain (1 - 3)    CAPILLARY BLOOD GLUCOSE      POCT Blood Glucose.: 218 mg/dL (24 Jun 2021 11:36)  POCT Blood Glucose.: 114 mg/dL (24 Jun 2021 08:07)  POCT Blood Glucose.: 135 mg/dL (23 Jun 2021 20:52)  POCT Blood Glucose.: 203 mg/dL (23 Jun 2021 16:33)    I&O's Summary    23 Jun 2021 07:01  -  24 Jun 2021 07:00  --------------------------------------------------------  IN: 300 mL / OUT: 750 mL / NET: -450 mL    24 Jun 2021 07:01  -  24 Jun 2021 13:15  --------------------------------------------------------  IN: 0 mL / OUT: 1 mL / NET: -1 mL        PHYSICAL EXAM:  Vital Signs Last 24 Hrs  T(C): 36.7 (24 Jun 2021 07:15), Max: 36.7 (23 Jun 2021 20:36)  T(F): 98.1 (24 Jun 2021 07:15), Max: 98.1 (24 Jun 2021 07:15)  HR: 70 (24 Jun 2021 07:15) (58 - 70)  BP: 146/80 (24 Jun 2021 07:15) (146/80 - 161/98)  BP(mean): --  RR: 14 (24 Jun 2021 07:15) (14 - 16)  SpO2: 99% (24 Jun 2021 07:15) (97% - 99%)    GENERAL: Not in distress. Alert    HEENT: clear conjuctiva, MMM. no pallor or icterus  CARDIOVASCULAR: RRR S1, S2. No murmur/rubs/gallop  LUNGS: BLAE+, no rales, no wheezing, no rhonchi.    ABDOMEN: ND. Soft,  NT, no guarding / rebound / rigidity. BS normoactive  BACK: No spine tenderness.  EXTREMITIES: no edema. no leg or calf TP.  SKIN: warm and dry  PSYCHIATRIC: Calm.  No agitation.    LABS:                        13.2   4.82  )-----------( 245      ( 24 Jun 2021 07:17 )             41.0     06-24    144  |  109<H>  |  34<H>  ----------------------------<  106<H>  4.4   |  29  |  2.52<H>    Ca    9.2      24 Jun 2021 07:17    TPro  7.2  /  Alb  2.9<L>  /  TBili  0.4  /  DBili  0.1  /  AST  35  /  ALT  26  /  AlkPhos  157<H>  06-24              COVID-19 PCR: NotDetec (21 Jun 2021 23:30)      RADIOLOGY & ADDITIONAL TESTS:  Imaging from Last 24 Hours:    Electrocardiogram/QTc Interval:    COORDINATION OF CARE:  Care Discussed with Consultants/Other Providers:

## 2021-06-24 NOTE — PROGRESS NOTE ADULT - ASSESSMENT
54 y/o M with PMHx of HTN, T2DM, HLD, depression, and prior CVA with no residual deficits, who presented to [facility] on 06/11/2021 with dysphagia and difficulty ambulating; found to have an acute right corona radiata infarct. Admitted for multidisciplinary rehab program.    #Acute corona radiata infarct and multiple b/l subacute infarcts  -Continue comprehensive rehab program - PT/OT/SLP per rehab team  -Pain management, bowel regimen per rehab   -Eliquis 2.5mg q12h and atorvastatin 80mg qhs    #JOSE, baseline Cr unknown  - BUN/Cr 35/2.55, per chart review BUN/Cr 24/2.11 on 6/20  - encourage PO hydration. increased IVF to LR 1000 mls @100 mls/hr stop after 2 doses.   - Avoid nephrotoxins, monitor bmp   - renal sono, urine Osm, urine sodium,   - monitor UO and BMP  - Echo for EF  - i and Os  -Nephro consult if creatinine elevated persistently    #HLD  #HTN - uncontrolled  -Amlodipine 10mg qd, labetalol 50mg q12h  -Lipitor 80mg qhs    #T2DM, A1C 5.9 - controlled  - FS and ISS  - DC home on Metformin 500mg BID, Glipizide 5mg daily, and Sitagliptin 50mg daily  - Start lantus 8un qhs while in rehab    #Neuropathy  -Gabapentin 600mg qhs    #Chronic Tobacco use  -Nicotine 21mg/24hr  -Smoking cessation counseling provided    #Mood/Depression  -Citalopram 20mg qd    #DVT ppx - Eliquis  - neg for b/l LE DVTs at [facility] 56 y/o M with PMHx of HTN, T2DM, HLD, depression, and prior CVA with no residual deficits, who presented to [facility] on 06/11/2021 with dysphagia and difficulty ambulating; found to have an acute right corona radiata infarct. Admitted for multidisciplinary rehab program.    #Acute corona radiata infarct and multiple b/l subacute infarcts  -Continue comprehensive rehab program - PT/OT/SLP per rehab team  -Pain management, bowel regimen per rehab   -Eliquis 2.5mg q12h and atorvastatin 80mg qhs    #JOSE, baseline Cr unknown  - BUN/Cr 35/2.55, per chart review BUN/Cr 24/2.11 on 6/20  - encourage PO hydration. increased IVF to LR 1000 mls @100 mls/hr stop after 2 doses.   - Avoid nephrotoxins, monitor bmp   - renal sono, urine Osm, urine sodium,   - monitor UO and BMP. renal panel ordered for tomorrow  - Echo for EF  - i and Os  -Nephro consult if creatinine elevated persistently    #HLD  #HTN - uncontrolled  -Amlodipine 10mg qd, labetalol 50mg q12h  -Lipitor 80mg qhs    #T2DM, A1C 5.9 - controlled  - FS and ISS  - DC home on Metformin 500mg BID, Glipizide 5mg daily, and Sitagliptin 50mg daily  - Start lantus 8un qhs while in rehab    #Neuropathy  -Gabapentin 600mg qhs    # hypoalbuminemia  - nutrition eval ongoing    #Chronic Tobacco use  -Nicotine 21mg/24hr  -Smoking cessation counseling provided    #Mood/Depression  -Citalopram 20mg qd    #DVT ppx - Eliquis  - neg for b/l LE DVTs at [facility]    d/w Dr. Shane

## 2021-06-25 LAB
ALBUMIN SERPL ELPH-MCNC: 2.6 G/DL — LOW (ref 3.3–5)
ANION GAP SERPL CALC-SCNC: 5 MMOL/L — SIGNIFICANT CHANGE UP (ref 5–17)
BUN SERPL-MCNC: 30 MG/DL — HIGH (ref 7–23)
CALCIUM SERPL-MCNC: 9.1 MG/DL — SIGNIFICANT CHANGE UP (ref 8.4–10.5)
CHLORIDE SERPL-SCNC: 108 MMOL/L — SIGNIFICANT CHANGE UP (ref 96–108)
CO2 SERPL-SCNC: 28 MMOL/L — SIGNIFICANT CHANGE UP (ref 22–31)
CREAT SERPL-MCNC: 2.35 MG/DL — HIGH (ref 0.5–1.3)
GLUCOSE BLDC GLUCOMTR-MCNC: 110 MG/DL — HIGH (ref 70–99)
GLUCOSE BLDC GLUCOMTR-MCNC: 178 MG/DL — HIGH (ref 70–99)
GLUCOSE BLDC GLUCOMTR-MCNC: 192 MG/DL — HIGH (ref 70–99)
GLUCOSE BLDC GLUCOMTR-MCNC: 195 MG/DL — HIGH (ref 70–99)
GLUCOSE SERPL-MCNC: 121 MG/DL — HIGH (ref 70–99)
OSMOLALITY SERPL: 303 MOSMOL/KG — HIGH (ref 275–300)
OSMOLALITY UR: 425 MOSM/KG — SIGNIFICANT CHANGE UP (ref 50–1200)
PHOSPHATE SERPL-MCNC: 4 MG/DL — SIGNIFICANT CHANGE UP (ref 2.5–4.5)
POTASSIUM SERPL-MCNC: 4.4 MMOL/L — SIGNIFICANT CHANGE UP (ref 3.5–5.3)
POTASSIUM SERPL-SCNC: 4.4 MMOL/L — SIGNIFICANT CHANGE UP (ref 3.5–5.3)
SODIUM SERPL-SCNC: 141 MMOL/L — SIGNIFICANT CHANGE UP (ref 135–145)
SODIUM UR-SCNC: 53 MMOL/L — SIGNIFICANT CHANGE UP

## 2021-06-25 PROCEDURE — 99233 SBSQ HOSP IP/OBS HIGH 50: CPT

## 2021-06-25 PROCEDURE — 90832 PSYTX W PT 30 MINUTES: CPT

## 2021-06-25 PROCEDURE — 76775 US EXAM ABDO BACK WALL LIM: CPT | Mod: 26

## 2021-06-25 PROCEDURE — 99232 SBSQ HOSP IP/OBS MODERATE 35: CPT

## 2021-06-25 RX ORDER — HYDRALAZINE HCL 50 MG
10 TABLET ORAL ONCE
Refills: 0 | Status: COMPLETED | OUTPATIENT
Start: 2021-06-25 | End: 2021-06-25

## 2021-06-25 RX ORDER — SODIUM CHLORIDE 9 MG/ML
1000 INJECTION, SOLUTION INTRAVENOUS
Refills: 0 | Status: DISCONTINUED | OUTPATIENT
Start: 2021-06-25 | End: 2021-06-25

## 2021-06-25 RX ORDER — HYDRALAZINE HCL 50 MG
25 TABLET ORAL EVERY 12 HOURS
Refills: 0 | Status: DISCONTINUED | OUTPATIENT
Start: 2021-06-25 | End: 2021-06-26

## 2021-06-25 RX ADMIN — Medication 5 MILLIGRAM(S): at 12:30

## 2021-06-25 RX ADMIN — Medication 50 MILLIGRAM(S): at 17:01

## 2021-06-25 RX ADMIN — Medication 25 MILLIGRAM(S): at 17:01

## 2021-06-25 RX ADMIN — Medication 1: at 12:30

## 2021-06-25 RX ADMIN — Medication 1: at 16:58

## 2021-06-25 RX ADMIN — GABAPENTIN 600 MILLIGRAM(S): 400 CAPSULE ORAL at 21:07

## 2021-06-25 RX ADMIN — INSULIN GLARGINE 8 UNIT(S): 100 INJECTION, SOLUTION SUBCUTANEOUS at 21:07

## 2021-06-25 RX ADMIN — APIXABAN 2.5 MILLIGRAM(S): 2.5 TABLET, FILM COATED ORAL at 05:33

## 2021-06-25 RX ADMIN — SENNA PLUS 2 TABLET(S): 8.6 TABLET ORAL at 21:07

## 2021-06-25 RX ADMIN — Medication 1 PATCH: at 12:30

## 2021-06-25 RX ADMIN — Medication 1 PATCH: at 12:34

## 2021-06-25 RX ADMIN — Medication 10 MILLIGRAM(S): at 05:34

## 2021-06-25 RX ADMIN — Medication 10 MILLIGRAM(S): at 21:31

## 2021-06-25 RX ADMIN — Medication 50 MILLIGRAM(S): at 05:33

## 2021-06-25 RX ADMIN — AMLODIPINE BESYLATE 10 MILLIGRAM(S): 2.5 TABLET ORAL at 05:33

## 2021-06-25 RX ADMIN — APIXABAN 2.5 MILLIGRAM(S): 2.5 TABLET, FILM COATED ORAL at 17:01

## 2021-06-25 RX ADMIN — Medication 1 PATCH: at 08:33

## 2021-06-25 RX ADMIN — Medication 1 PATCH: at 19:27

## 2021-06-25 RX ADMIN — ATORVASTATIN CALCIUM 80 MILLIGRAM(S): 80 TABLET, FILM COATED ORAL at 21:07

## 2021-06-25 RX ADMIN — Medication 50 MILLIGRAM(S): at 21:07

## 2021-06-25 RX ADMIN — CITALOPRAM 20 MILLIGRAM(S): 10 TABLET, FILM COATED ORAL at 12:29

## 2021-06-25 RX ADMIN — POLYETHYLENE GLYCOL 3350 17 GRAM(S): 17 POWDER, FOR SOLUTION ORAL at 12:31

## 2021-06-25 NOTE — PROGRESS NOTE ADULT - ASSESSMENT
MARK ANTHONY QUINTANA is a 55M with PMHx of HTN, DM, HLD, ,depression, and prior CVA with no residual deifcits, who presented to [facility] on 06/11/2021 with dysphagia and difficulty ambulating, & dysarthria found to have an acute right corona radiata infarct. Admitted for multidisciplinary rehab program.      #Comprehensive Multidisciplinary Rehab Program:  - Gait, ADL, Functional impairments  - PT/OT/ SLP 3 hours a day 5 days a week    #Acute corona radiata infarct and multiple b/l subacute infarcts  - distribution of b/l subacute infarcts suggestive of cardioembolic origin  - not a candidate for tPA or thrombectomy  - c/w Eliquis 2.5mg q12h and atorvastatin 80mg qhs    #HLD  #HTN  - c/w amlodipine 10mg daily and labetalol  50mg q12h  - hydralazine increased to 25mg TID  - c/w atorvastatin    #CKD  - Cr is improved this AM to 2.35  - IVF on hold for now; MBS today and if upgraded to thins, can DC fluids all together and encourage PO intake. Patient barely drinking nectar-consistency fluids despite encouragement    #DM  - FS and ISS  - DC home on Metformin 500mg BID, Glipizide 5mg daily, and Sitagliptin 50mg daily    #Neuropathy  - c/w gabapentin 600mg qhs    #Tobacco use  - Nicotine 21mg/24hr    #Mood/Depression  - c/w Citalopram 20mg daily    #Sleep:  - Maintain quiet hours and low stim environment; turn off TV at 9PM to encourage proper sleep hygiene  - cont.  trazodone    #Pain:  - Tylenol PRN  - avoid sedating meds that may affect cognitive recovery    #GI/Bowel:  - Senna 2 tabs qhs and Miralax PRN  - PO Dulcolax PRN    #/Bladder:  - continent    #Diet / Dysphagia:    - Diet: soft/honey  - ongoing SLP assessment  - Nutrition to follow    #Skin/ Pressure Injury Prevention:  - assessment on admission   - Turn Q2hrs in bed while awake, OOB to Chair, PT/OT/SLP     #DVT prophylaxis:  - Eliquis  - SCDs  - neg for b/l LE DVTs at [facility]    #Precautions/ Restrictions  - Falls  - Lungs: Aspiration, Incentive Spirometer    - COVID PCR: neg 6/11    #Dispo: IDR 06/24/2021  - OT: setup/supervision eating/UBD; min assist grooming/toilet transfer/LBD; mod assist bathing  - PT: min assist transfer; mod assist ambulation with RW and WC follow; mod assist 4 steps with 2 hand rails  - SLP: mod-severe cog deficits; (+) dysarthria and dysphagia; soft/nectar diet; MBS this week  - Goals: Oralia transfers, supervision ambulation 150', supervision 12 steps, oralia eating/grooming/UBD, supervision LBD/bathing/functional transfers  - TDD: [place] home 7/9    #LABs  - CBC BMP 6/28    --------------------------------------------  Outpatient Follow up:  Neurology - Dr. Nnea Dumont on 07/22/2021 @ 11AM via telehealth  PCP  ---------------------------------------- MARK ANTHONY QUINTANA is a 55M with PMHx of HTN, DM, HLD, ,depression, and prior CVA with no residual deifcits, who presented to [facility] on 06/11/2021 with dysphagia and difficulty ambulating, & dysarthria found to have an acute right corona radiata infarct. Admitted for multidisciplinary rehab program.      #Comprehensive Multidisciplinary Rehab Program:  - Gait, ADL, Functional impairments  - PT/OT/ SLP 3 hours a day 5 days a week    #Acute corona radiata infarct and multiple b/l subacute infarcts  - distribution of b/l subacute infarcts suggestive of cardioembolic origin  - not a candidate for tPA or thrombectomy  - c/w Eliquis 2.5mg q12h and atorvastatin 80mg qhs    #HLD  #HTN  - c/w amlodipine 10mg daily and labetalol  50mg q12h  - hydralazine increased to 25mg BID by hospitalist  - c/w atorvastatin    #CKD  - Cr is improved this AM to 2.35  - IVF on hold for now; MBS today and if upgraded to thins, can DC fluids all together and encourage PO intake. Patient barely drinking nectar-consistency fluids despite encouragement    #DM  - FS and ISS  - DC home on Metformin 500mg BID, Glipizide 5mg daily, and Sitagliptin 50mg daily    #Neuropathy  - c/w gabapentin 600mg qhs    #Tobacco use  - Nicotine 21mg/24hr    #Mood/Depression  - c/w Citalopram 20mg daily    #Sleep:  - Maintain quiet hours and low stim environment; turn off TV at 9PM to encourage proper sleep hygiene  - cont.  trazodone    #Pain:  - Tylenol PRN  - avoid sedating meds that may affect cognitive recovery    #GI/Bowel:  - Senna 2 tabs qhs and Miralax PRN  - PO Dulcolax PRN    #/Bladder:  - continent      #Skin/ Pressure Injury Prevention:  - assessment on admission   - Turn Q2hrs in bed while awake, OOB to Chair, PT/OT/SLP     #DVT prophylaxis:  - Eliquis  - SCDs  - neg for b/l LE DVTs at [facility]    #Precautions/ Restrictions  - Falls  - Lungs: Aspiration, Incentive Spirometer    - COVID PCR: neg 6/11    #Dispo: IDR 06/24/2021  - OT: setup/supervision eating/UBD; min assist grooming/toilet transfer/LBD; mod assist bathing  - PT: min assist transfer; mod assist ambulation with RW and WC follow; mod assist 4 steps with 2 hand rails  - SLP: mod-severe cog deficits; (+) dysarthria and dysphagia; soft/nectar diet; MBS this week  - Goals: Oralia transfers, supervision ambulation 150', supervision 12 steps, oralia eating/grooming/UBD, supervision LBD/bathing/functional transfers  - TDD: [facility] 7/9    #LABs  - [facility] 6/28    --------------------------------------------  Outpatient Follow up:  Neurology - Dr. Nena Dumont on 07/22/2021 @ 11AM via telehealth  PCP  ----------------------------------------

## 2021-06-25 NOTE — PROGRESS NOTE ADULT - SUBJECTIVE AND OBJECTIVE BOX
Medicine Progress Note    Patient is a 55y old  Male who presents with a chief complaint of Acute right corona radiata CVA (24 Jun 2021 11:30)      SUBJECTIVE / OVERNIGHT EVENTS:  seen and examined  Chart reviewed    overnight /107. extra doses of hydralazine was given and IVF dose was reduced.   Reported Limb weakness improving with therapy.   Today reported no BM since in hospital. not eating and drinking well. does not like thick liquid. wants regular food and drink.   No pain  No other complaints    ADDITIONAL REVIEW OF SYSTEMS:  no fever/chills/CP/sob/palpitation/dizziness/ abd pain/nausea/vomiting/diarrhea/headaches. all other ROS neg      MEDICATIONS  (STANDING):  amLODIPine   Tablet 10 milliGRAM(s) Oral daily  apixaban 2.5 milliGRAM(s) Oral every 12 hours  atorvastatin 80 milliGRAM(s) Oral at bedtime  citalopram 20 milliGRAM(s) Oral daily  dextrose 40% Gel 15 Gram(s) Oral once  dextrose 5%. 1000 milliLiter(s) (50 mL/Hr) IV Continuous <Continuous>  dextrose 5%. 1000 milliLiter(s) (100 mL/Hr) IV Continuous <Continuous>  dextrose 50% Injectable 25 Gram(s) IV Push once  dextrose 50% Injectable 12.5 Gram(s) IV Push once  dextrose 50% Injectable 25 Gram(s) IV Push once  gabapentin 600 milliGRAM(s) Oral at bedtime  glucagon  Injectable 1 milliGRAM(s) IntraMuscular once  hydrALAZINE 25 milliGRAM(s) Oral every 12 hours  insulin glargine Injectable (LANTUS) 8 Unit(s) SubCutaneous at bedtime  insulin lispro (ADMELOG) corrective regimen sliding scale   SubCutaneous three times a day before meals  insulin lispro (ADMELOG) corrective regimen sliding scale   SubCutaneous at bedtime  labetalol 50 milliGRAM(s) Oral two times a day  lactated ringers. 1000 milliLiter(s) (50 mL/Hr) IV Continuous <Continuous>  nicotine - 21 mG/24Hr(s) Patch 1 patch Transdermal daily  polyethylene glycol 3350 17 Gram(s) Oral daily  senna 2 Tablet(s) Oral at bedtime  traZODone 50 milliGRAM(s) Oral at bedtime    MEDICATIONS  (PRN):  acetaminophen    Suspension .. 650 milliGRAM(s) Oral every 6 hours PRN Temp greater or equal to 38C (100.4F), Mild Pain (1 - 3)  bisacodyl 5 milliGRAM(s) Oral every 12 hours PRN Constipation      CAPILLARY BLOOD GLUCOSE      POCT Blood Glucose.: 110 mg/dL (25 Jun 2021 08:32)  POCT Blood Glucose.: 174 mg/dL (24 Jun 2021 21:20)  POCT Blood Glucose.: 140 mg/dL (24 Jun 2021 16:38)  POCT Blood Glucose.: 218 mg/dL (24 Jun 2021 11:36)      I&O's Detail    24 Jun 2021 07:01  -  25 Jun 2021 07:00  --------------------------------------------------------  IN:    Lactated Ringers: 300 mL    Lactated Ringers: 400 mL  Total IN: 700 mL    OUT:    Voided (mL): 851 mL  Total OUT: 851 mL    Total NET: -151 mL      PHYSICAL EXAM:    Vital Signs Last 24 Hrs  T(C): 36.4 (25 Jun 2021 08:34), Max: 36.7 (24 Jun 2021 23:55)  T(F): 97.5 (25 Jun 2021 08:34), Max: 98.1 (24 Jun 2021 23:55)  HR: 66 (25 Jun 2021 08:34) (61 - 73)  BP: 145/87 (25 Jun 2021 08:34) (144/84 - 176/107)  BP(mean): --  RR: 14 (25 Jun 2021 08:34) (14 - 16)  SpO2: 97% (25 Jun 2021 08:34) (95% - 98%)    GENERAL: Not in distress. Alert    HEENT: clear conjuctiva, MM dry. no pallor or icterus  CARDIOVASCULAR: RRR S1, S2. No murmur/rubs/gallop  LUNGS: BLAE+, no rales, no wheezing, no rhonchi.    ABDOMEN: ND. Soft,  NT, no guarding / rebound / rigidity. BS normoactive  EXTREMITIES: no edema. no leg or calf TP.  SKIN: warm and dry  PSYCHIATRIC: Calm.  No agitation.    LABS:                        13.2   4.82  )-----------( 245      ( 24 Jun 2021 07:17 )             41.0     06-24    144  |  109<H>  |  34<H>  ----------------------------<  106<H>  4.4   |  29  |  2.52<H>    Ca    9.2      24 Jun 2021 07:17    TPro  7.2  /  Alb  2.9<L>  /  TBili  0.4  /  DBili  0.1  /  AST  35  /  ALT  26  /  AlkPhos  157<H>  06-24              COVID-19 PCR: NotDetec (21 Jun 2021 23:30)      RADIOLOGY & ADDITIONAL TESTS:  Imaging from Last 24 Hours:    Electrocardiogram/QTc Interval:    COORDINATION OF CARE:  Care Discussed with Consultants/Other Providers:

## 2021-06-25 NOTE — PROGRESS NOTE ADULT - SUBJECTIVE AND OBJECTIVE BOX
Pt was seen for 30 min. Pt c/o sad mood, worry. Chart was reviewed, approached Pt, introduced the role of neuropsychology in the tx team and explained the nature and purpose of the initial eval. Pt agreed to participate. Session focused on establishing rapport, gathering biographical information, assessing Pt's emotional functioning, and provided supportive counseling. Pt was able to provided significant biographical information including medical and social hx, including hx of current illness. Pt also answer different questions during a brief clinical interview to assess his current emotional functioning, which will be reported in a subsequent consultation note. Some counseling was provided on the spot given that he reported some adjustment symptoms. Support and encouragement were provided.

## 2021-06-25 NOTE — PROGRESS NOTE ADULT - ASSESSMENT
Pt alert, fair attention, constricted affect, euthymic mood, denied AH/VH, denied SI/HI/I/P, thought process - goal-directed, no abnormal thought contents noted, calm behavior, difficulty coping with his current deficits. Plan: Continue the assessment process, continue supportive counseling.

## 2021-06-25 NOTE — PROGRESS NOTE ADULT - ASSESSMENT
56 y/o M with PMHx of HTN, T2DM, HLD, depression, and prior CVA with no residual deficits, who presented to [facility] on 06/11/2021 with dysphagia and difficulty ambulating; found to have an acute right corona radiata infarct. Admitted for multidisciplinary rehab program.    #Acute corona radiata infarct and multiple b/l subacute infarcts  -Continue comprehensive rehab program - PT/OT/SLP per rehab team  -Pain management, bowel regimen per rehab   -Eliquis 2.5mg q12h and atorvastatin 80mg qhs    #JOSE, Probable CKD 3  - baseline Cr unknown  - BUN/Cr 35/2.55, per chart review BUN/Cr 24/2.11 on 6/20  - UA: proteinuria 6/24. no infection  - encourage PO hydration. patient does not like to drink thick water. suggest SLP reval  - gentle IVF  - Avoid nephrotoxins, monitor bmp   - f/u renal sono, urine Osm, urine sodium, S. osm high  - f/u renal panel today  - monitor UO and BMP.   - Echo for EF 6/24: normal EF. grade 2 DD  - i and Os  - Nephro consult if creatinine elevated persistently or worsening      #HTN - uncontrolled  -Amlodipine 10mg qd, labetalol 50mg q12h, hydralazine 10 mg TID  - changed hydralazine to 25 mg Q12hrs with holding on 6/25  - monitor BP.  - check orthostasis periodically  - DASH/TLC    #HLD  -Lipitor 80mg qhs    #T2DM, A1C 5.9 - controlled  - FS and ISS  - DC home on Metformin 500mg BID, Glipizide 5mg daily, and Sitagliptin 50mg daily  - Start lantus 8un qhs while in rehab    #Neuropathy  -Gabapentin 600mg qhs    # hypoalbuminemia  - nutrition eval ongoing    #Chronic Tobacco use  -Nicotine 21mg/24hr  -Smoking cessation counseling provided    #Mood/Depression  -Citalopram 20mg qd    #DVT ppx - Eliquis  - neg for b/l LE DVTs at [facility]    d/w Dr. Shane

## 2021-06-25 NOTE — PROGRESS NOTE ADULT - SUBJECTIVE AND OBJECTIVE BOX
DAILY PROGRESS NOTE:  HPI:  MARK ANTHONY QUINTANA is a 55M with PMHx of HTN, DM, HLD, ,depression, and prior CVA with no residual deficits, who presented to [facility] on 2021 with several days of dysphagia and difficulty ambulating. Patient had been experiencing symptoms since  after coming home from a dental appointment. He was brought to the hospital after a wellness check from his insurance company. In the ED, his Utox tested positive for cocaine, though patient denied use, and CT head scan showed findings c/w an acute right corona radiata infarct, along with multiple b/l subacute infarcts. Patient was managed medically, as he was not a candidate for either tPA or thrombectomy.    Patient was evaluated by PM&R and therapy for functional deficits and gait/ ADL impairments and recommended acute rehabilitation. Patient was medically optimized for discharge to [facility] on 2021. Patient was seen and examined at the bedside upon arrival.  (2021 15:04)      Subjective:  Patient was seen and examined at the bedside this AM. SBP 170s last night, so IVF was decreased from 100cc/hr to 50cc/hr. Feels sleepy this morning. States he did not sleep very well last night because he stayed up to watch the basketball game. Otherwise, no other complaints. Continued to encourage PO intake.     ROS:  (+) Insomnia. Has yet to have BM at [facility].       Physical Exam:  Vital Signs Last 24 Hrs  T(C): 36.4 (2021 08:34), Max: 36.7 (2021 23:55)  T(F): 97.5 (2021 08:34), Max: 98.1 (2021 23:55)  HR: 66 (2021 08:34) (61 - 73)  BP: 145/87 (2021 08:34) (144/84 - 176/107)  BP(mean): --  RR: 14 (2021 08:34) (14 - 16)  SpO2: 97% (2021 08:34) (95% - 98%)      21 @ 07:01  -  21 @ 07:00  --------------------------------------------------------  IN: 700 mL / OUT: 851 mL / NET: -151 mL    21 @ 07:01  -  21 @ 10:48  --------------------------------------------------------  IN: 170 mL / OUT: 0 mL / NET: 170 mL      Constitutional - NAD, Comfortable  HEENT - NCAT  Chest - CTAB   Cardio - warm and well perfused, RRR, no murmur  Abdomen -  Soft, NTND, (+) BS  Extremities - No peripheral edema, No calf tenderness   Neurologic Exam:                    Cognitive -             Orientation: A&O x4     Speech - (+) mild dysarthria,  No aphasia     Cranial Nerves - Right pupil sluggish, left pupil reactive, Visual fields slightly impaired on right, flattening of right NLF, EOMI, Shoulder shrug intact, +dysarthria, +dysphagia     Motor -                      LEFT    UE - ShAB 5/5, EF 5/5, EE 4/5, WE 5/5,  WNL                    RIGHT UE - ShAB 5/5, EF 5/5, EE 4/5, WE 5/5,  WNL                    LEFT    LE - HF 5/5, KE 5/5, DF 5/5, PF 5/5                    RIGHT LE - HF 4/5, KE 4/5, DF 4/5, PF 4/5        Sensory - diminished sensation in dorsum and plantar aspect of right foot     Coordination - FTN intact & HTS slightly impaired on right  Psychiatric - mood stable, appropriate      Recent Labs/Imagin.2   4.82  )-----------( 245      ( 2021 07:17 )             41.0     06-25    141  |  108  |  30<H>  ----------------------------<  121<H>  4.4   |  28  |  2.35<H>    Ca    9.1      2021 09:30  Phos  4.0     0625    TPro  x   /  Alb  2.6<L>  /  TBili  x   /  DBili  x   /  AST  x   /  ALT  x   /  AlkPhos  x         Urinalysis Basic - ( 2021 18:30 )  Color: Yellow / Appearance: Clear / S.020 / pH: x  Gluc: x / Ketone: Negative  / Bili: Negative / Urobili: Negative   Blood: x / Protein: 500 mg/dL / Nitrite: Negative   Leuk Esterase: Negative / RBC: 0-4 /HPF / WBC Negative /HPF   Sq Epi: x / Non Sq Epi: Neg.-Few / Bacteria: Negative /HPF      POCT Blood Glucose.: 110 mg/dL (21 @ 08:32)  POCT Blood Glucose.: 174 mg/dL (21 @ 21:20)  POCT Blood Glucose.: 140 mg/dL (21 @ 16:38)  POCT Blood Glucose.: 218 mg/dL (21 @ 11:36)      Medications:  acetaminophen    Suspension .. 650 milliGRAM(s) Oral every 6 hours PRN  amLODIPine   Tablet 10 milliGRAM(s) Oral daily  apixaban 2.5 milliGRAM(s) Oral every 12 hours  atorvastatin 80 milliGRAM(s) Oral at bedtime  bisacodyl 5 milliGRAM(s) Oral every 12 hours PRN  citalopram 20 milliGRAM(s) Oral daily  dextrose 40% Gel 15 Gram(s) Oral once  dextrose 5%. 1000 milliLiter(s) IV Continuous <Continuous>  dextrose 5%. 1000 milliLiter(s) IV Continuous <Continuous>  dextrose 50% Injectable 25 Gram(s) IV Push once  dextrose 50% Injectable 12.5 Gram(s) IV Push once  dextrose 50% Injectable 25 Gram(s) IV Push once  gabapentin 600 milliGRAM(s) Oral at bedtime  glucagon  Injectable 1 milliGRAM(s) IntraMuscular once  hydrALAZINE 25 milliGRAM(s) Oral every 12 hours  insulin glargine Injectable (LANTUS) 8 Unit(s) SubCutaneous at bedtime  insulin lispro (ADMELOG) corrective regimen sliding scale   SubCutaneous three times a day before meals  insulin lispro (ADMELOG) corrective regimen sliding scale   SubCutaneous at bedtime  labetalol 50 milliGRAM(s) Oral two times a day  lactated ringers. 1000 milliLiter(s) IV Continuous <Continuous>  nicotine - 21 mG/24Hr(s) Patch 1 patch Transdermal daily  polyethylene glycol 3350 17 Gram(s) Oral daily  senna 2 Tablet(s) Oral at bedtime  traZODone 50 milliGRAM(s) Oral at bedtime   DAILY PROGRESS NOTE:  HPI:  MARK ANTHONY QUINTANA is a 55M with PMHx of HTN, DM, HLD, ,depression, and prior CVA with no residual deficits, who presented to [facility] on 2021 with several days of dysphagia and difficulty ambulating. Patient had been experiencing symptoms since  after coming home from a dental appointment. He was brought to the hospital after a wellness check from his insurance company. In the ED, his Utox tested positive for cocaine, though patient denied use, and CT head scan showed findings c/w an acute right corona radiata infarct, along with multiple b/l subacute infarcts. Patient was managed medically, as he was not a candidate for either tPA or thrombectomy.    Patient was evaluated by PM&R and therapy for functional deficits and gait/ ADL impairments and recommended acute rehabilitation. Patient was medically optimized for discharge to [facility] on 2021. Patient was seen and examined at the bedside upon arrival.  (2021 15:04)      Subjective:  Patient was seen and examined at the bedside this AM. SBP 170s last night, so IVF was decreased from 100cc/hr to 50cc/hr. Feels sleepy this morning. States he did not sleep very well last night because he stayed up to watch the basketball game. Otherwise, no other complaints. Continued to encourage PO intake.     ROS:  (+) Insomnia. Has yet to have BM at [facility].       Physical Exam:  Vital Signs Last 24 Hrs  T(C): 36.4 (2021 08:34), Max: 36.7 (2021 23:55)  T(F): 97.5 (2021 08:34), Max: 98.1 (2021 23:55)  HR: 66 (2021 08:34) (61 - 73)  BP: 145/87 (2021 08:34) (144/84 - 176/107)  BP(mean): --  RR: 14 (2021 08:34) (14 - 16)  SpO2: 97% (2021 08:34) (95% - 98%)      21 @ 07:01  -  21 @ 07:00  --------------------------------------------------------  IN: 700 mL / OUT: 851 mL / NET: -151 mL    21 @ 07:01  -  21 @ 10:48  --------------------------------------------------------  IN: 170 mL / OUT: 0 mL / NET: 170 mL      Constitutional - NAD, Comfortable  HEENT - NCAT  Chest - CTAB   Cardio - warm and well perfused, RRR, no murmur  Abdomen -  Soft, NTND, (+) BS  Extremities - No peripheral edema, No calf tenderness   Neurologic Exam:                    Cognitive -             Orientation: drowsy but O x4     Speech - (+) mild dysarthria,  No aphasia     Cranial Nerves - Right pupil sluggish, left pupil reactive, Visual fields slightly impaired on right, flattening of right NLF, EOMI, Shoulder shrug intact, +dysarthria, +dysphagia     Motor -                      LEFT    UE - ShAB 5/5, EF 5/5, EE 4/5, WE 5/5,  WNL                    RIGHT UE - ShAB 5/5, EF 5/5, EE 4/5, WE 5/5,  WNL                    LEFT    LE - HF 5/5, KE 5/5, DF 5/5, PF 5/5                    RIGHT LE - HF 4/5, KE 4/5, DF 4/5, PF 4/5        Sensory - diminished sensation in dorsum and plantar aspect of right foot     Coordination - FTN intact & HTS slightly impaired on right  Psychiatric - mood stable, appropriate      Recent Labs/Imagin.2   4.82  )-----------( 245      ( 2021 07:17 )             41.0     06-    141  |  108  |  30<H>  ----------------------------<  121<H>  4.4   |  28  |  2.35<H>    Ca    9.1      2021 09:30  Phos  4.0     06-25    TPro  x   /  Alb  2.6<L>  /  TBili  x   /  DBili  x   /  AST  x   /  ALT  x   /  AlkPhos  x         Urinalysis Basic - ( 2021 18:30 )  Color: Yellow / Appearance: Clear / S.020 / pH: x  Gluc: x / Ketone: Negative  / Bili: Negative / Urobili: Negative   Blood: x / Protein: 500 mg/dL / Nitrite: Negative   Leuk Esterase: Negative / RBC: 0-4 /HPF / WBC Negative /HPF   Sq Epi: x / Non Sq Epi: Neg.-Few / Bacteria: Negative /HPF      POCT Blood Glucose.: 110 mg/dL (21 @ 08:32)  POCT Blood Glucose.: 174 mg/dL (21 @ 21:20)  POCT Blood Glucose.: 140 mg/dL (21 @ 16:38)  POCT Blood Glucose.: 218 mg/dL (21 @ 11:36)      Medications:  acetaminophen    Suspension .. 650 milliGRAM(s) Oral every 6 hours PRN  amLODIPine   Tablet 10 milliGRAM(s) Oral daily  apixaban 2.5 milliGRAM(s) Oral every 12 hours  atorvastatin 80 milliGRAM(s) Oral at bedtime  bisacodyl 5 milliGRAM(s) Oral every 12 hours PRN  citalopram 20 milliGRAM(s) Oral daily  dextrose 40% Gel 15 Gram(s) Oral once  dextrose 5%. 1000 milliLiter(s) IV Continuous <Continuous>  dextrose 5%. 1000 milliLiter(s) IV Continuous <Continuous>  dextrose 50% Injectable 25 Gram(s) IV Push once  dextrose 50% Injectable 12.5 Gram(s) IV Push once  dextrose 50% Injectable 25 Gram(s) IV Push once  gabapentin 600 milliGRAM(s) Oral at bedtime  glucagon  Injectable 1 milliGRAM(s) IntraMuscular once  hydrALAZINE 25 milliGRAM(s) Oral every 12 hours  insulin glargine Injectable (LANTUS) 8 Unit(s) SubCutaneous at bedtime  insulin lispro (ADMELOG) corrective regimen sliding scale   SubCutaneous three times a day before meals  insulin lispro (ADMELOG) corrective regimen sliding scale   SubCutaneous at bedtime  labetalol 50 milliGRAM(s) Oral two times a day  lactated ringers. 1000 milliLiter(s) IV Continuous <Continuous>  nicotine - 21 mG/24Hr(s) Patch 1 patch Transdermal daily  polyethylene glycol 3350 17 Gram(s) Oral daily  senna 2 Tablet(s) Oral at bedtime  traZODone 50 milliGRAM(s) Oral at bedtime

## 2021-06-26 LAB
GLUCOSE BLDC GLUCOMTR-MCNC: 111 MG/DL — HIGH (ref 70–99)
GLUCOSE BLDC GLUCOMTR-MCNC: 127 MG/DL — HIGH (ref 70–99)
GLUCOSE BLDC GLUCOMTR-MCNC: 134 MG/DL — HIGH (ref 70–99)
GLUCOSE BLDC GLUCOMTR-MCNC: 162 MG/DL — HIGH (ref 70–99)

## 2021-06-26 PROCEDURE — 99233 SBSQ HOSP IP/OBS HIGH 50: CPT

## 2021-06-26 RX ORDER — HYDRALAZINE HCL 50 MG
25 TABLET ORAL EVERY 8 HOURS
Refills: 0 | Status: DISCONTINUED | OUTPATIENT
Start: 2021-06-26 | End: 2021-07-01

## 2021-06-26 RX ADMIN — GABAPENTIN 600 MILLIGRAM(S): 400 CAPSULE ORAL at 21:15

## 2021-06-26 RX ADMIN — Medication 1 PATCH: at 07:35

## 2021-06-26 RX ADMIN — Medication 1 PATCH: at 12:08

## 2021-06-26 RX ADMIN — Medication 1 PATCH: at 19:46

## 2021-06-26 RX ADMIN — Medication 25 MILLIGRAM(S): at 21:13

## 2021-06-26 RX ADMIN — Medication 50 MILLIGRAM(S): at 21:14

## 2021-06-26 RX ADMIN — POLYETHYLENE GLYCOL 3350 17 GRAM(S): 17 POWDER, FOR SOLUTION ORAL at 12:06

## 2021-06-26 RX ADMIN — Medication 25 MILLIGRAM(S): at 05:27

## 2021-06-26 RX ADMIN — AMLODIPINE BESYLATE 10 MILLIGRAM(S): 2.5 TABLET ORAL at 05:27

## 2021-06-26 RX ADMIN — Medication 50 MILLIGRAM(S): at 05:28

## 2021-06-26 RX ADMIN — Medication 1 PATCH: at 12:07

## 2021-06-26 RX ADMIN — Medication 50 MILLIGRAM(S): at 17:24

## 2021-06-26 RX ADMIN — APIXABAN 2.5 MILLIGRAM(S): 2.5 TABLET, FILM COATED ORAL at 05:27

## 2021-06-26 RX ADMIN — APIXABAN 2.5 MILLIGRAM(S): 2.5 TABLET, FILM COATED ORAL at 17:25

## 2021-06-26 RX ADMIN — Medication 25 MILLIGRAM(S): at 14:06

## 2021-06-26 RX ADMIN — Medication 1: at 12:06

## 2021-06-26 RX ADMIN — INSULIN GLARGINE 8 UNIT(S): 100 INJECTION, SOLUTION SUBCUTANEOUS at 21:15

## 2021-06-26 RX ADMIN — ATORVASTATIN CALCIUM 80 MILLIGRAM(S): 80 TABLET, FILM COATED ORAL at 21:13

## 2021-06-26 RX ADMIN — SENNA PLUS 2 TABLET(S): 8.6 TABLET ORAL at 21:13

## 2021-06-26 RX ADMIN — CITALOPRAM 20 MILLIGRAM(S): 10 TABLET, FILM COATED ORAL at 12:05

## 2021-06-26 NOTE — PROGRESS NOTE ADULT - SUBJECTIVE AND OBJECTIVE BOX
Patient is a 55y old  Male who presents with a chief complaint of Acute right corona radiata CVA (2021 15:00)      Patient seen and examined at bedside. sleepy, no acute medical complaints. BP uncontrolled, received extra dose hydralazine overnight.     ALLERGIES:  No Known Allergies    MEDICATIONS  (STANDING):  amLODIPine   Tablet 10 milliGRAM(s) Oral daily  apixaban 2.5 milliGRAM(s) Oral every 12 hours  atorvastatin 80 milliGRAM(s) Oral at bedtime  citalopram 20 milliGRAM(s) Oral daily  dextrose 40% Gel 15 Gram(s) Oral once  dextrose 5%. 1000 milliLiter(s) (50 mL/Hr) IV Continuous <Continuous>  dextrose 5%. 1000 milliLiter(s) (100 mL/Hr) IV Continuous <Continuous>  dextrose 50% Injectable 25 Gram(s) IV Push once  dextrose 50% Injectable 12.5 Gram(s) IV Push once  dextrose 50% Injectable 25 Gram(s) IV Push once  gabapentin 600 milliGRAM(s) Oral at bedtime  glucagon  Injectable 1 milliGRAM(s) IntraMuscular once  hydrALAZINE 25 milliGRAM(s) Oral every 12 hours  insulin glargine Injectable (LANTUS) 8 Unit(s) SubCutaneous at bedtime  insulin lispro (ADMELOG) corrective regimen sliding scale   SubCutaneous three times a day before meals  insulin lispro (ADMELOG) corrective regimen sliding scale   SubCutaneous at bedtime  labetalol 50 milliGRAM(s) Oral two times a day  nicotine - 21 mG/24Hr(s) Patch 1 patch Transdermal daily  polyethylene glycol 3350 17 Gram(s) Oral daily  senna 2 Tablet(s) Oral at bedtime  traZODone 50 milliGRAM(s) Oral at bedtime    MEDICATIONS  (PRN):  acetaminophen    Suspension .. 650 milliGRAM(s) Oral every 6 hours PRN Temp greater or equal to 38C (100.4F), Mild Pain (1 - 3)  bisacodyl 5 milliGRAM(s) Oral every 12 hours PRN Constipation    Vital Signs Last 24 Hrs  T(F): 97.6 (2021 07:33), Max: 98.1 (2021 17:03)  HR: 69 (2021 07:33) (65 - 72)  BP: 154/80 (2021 07:33) (154/80 - 175/105)  RR: 14 (2021 07:33) (14 - 16)  SpO2: 98% (2021 07:33) (96% - 99%)  I&O's Summary    2021 07:01  -  2021 07:00  --------------------------------------------------------  IN: 650 mL / OUT: 900 mL / NET: -250 mL      BMI (kg/m2): 30.8 (21 @ 01:00)    PHYSICAL EXAM:  General: NAD, drowsy  ENT: MMM, no scleral icterus  Neck: Supple, No JVD  Lungs: Respirations unlabored. CTA b/l, diminished at bases  Cardio: RRR, S1/S2  Abdomen: Soft, Nontender, Nondistended; Bowel sounds present  Extremities: No calf tenderness, No pitting edema LE b/l      LABS:                        13.2   4.82  )-----------( 245      ( 2021 07:17 )             41.0           141  |  108  |  30  ----------------------------<  121  4.4   |  28  |  2.35    Ca    9.1      2021 09:30  Phos  4.0         TPro  x   /  Alb  2.6  /  TBili  x   /  DBili  x   /  AST  x   /  ALT  x   /  AlkPhos  x        eGFR if Non African American: 30 mL/min/1.73M2 (21 @ 09:30)  eGFR if African American: 35 mL/min/1.73M2 (21 @ 09:30)     POCT Blood Glucose.: 127 mg/dL (2021 07:36)  POCT Blood Glucose.: 195 mg/dL (2021 21:04)  POCT Blood Glucose.: 192 mg/dL (2021 16:55)  POCT Blood Glucose.: 178 mg/dL (2021 12:26)    Urinalysis Basic - ( 2021 18:30 )    Color: Yellow / Appearance: Clear / S.020 / pH: x  Gluc: x / Ketone: Negative  / Bili: Negative / Urobili: Negative   Blood: x / Protein: 500 mg/dL / Nitrite: Negative   Leuk Esterase: Negative / RBC: 0-4 /HPF / WBC Negative /HPF   Sq Epi: x / Non Sq Epi: Neg.-Few / Bacteria: Negative /HPF    COVID-19 PCR: NotDetec (21 @ 23:30)    RADIOLOGY & ADDITIONAL TESTS: reviewed    Care Discussed with Consultants/Other Providers: yes - rehab

## 2021-06-26 NOTE — PROGRESS NOTE ADULT - ASSESSMENT
54 y/o M with PMHx of HTN, T2DM, HLD, depression, and prior CVA with no residual deficits, who presented to [facility] on 06/11/2021 with dysphagia and difficulty ambulating; found to have an acute right corona radiata infarct. Admitted for multidisciplinary rehab program.    #Acute corona radiata infarct and multiple b/l subacute infarcts  -Continue comprehensive rehab program - PT/OT/SLP per rehab team  -Pain management, bowel regimen per rehab   -Eliquis 2.5mg q12h and atorvastatin 80mg qhs    #JOSE, baseline Cr unknown - probably CKD3  -BUN/Cr 35/2.55, per chart review BUN/Cr 24/2.11 on 6/20  -Some improvement noted after IVF. Encourage PO hydration  -Avoid nephrotoxins, monitor bmp   -Nephro consult  -Renal sono reviewed -no b/l hydro, renal mass, or calculi visualized     #HLD  #HTN - uncontrolled  -Amlodipine 10mg qd, labetalol 50mg q12h  -Lipitor 80mg qhs  -Increase hydralazine to 25mg TID    #T2DM, A1C 5.9 - controlled  - FS and ISS  - DC home on Metformin 500mg BID, Glipizide 5mg daily, and Sitagliptin 50mg daily  - Lantus 8un qhs while in rehab    #Neuropathy  -Gabapentin 600mg qhs    #Chronic Tobacco use  -Nicotine 21mg/24hr  -Smoking cessation counseling provided    #Mood/Depression  -Citalopram 20mg qd    #DVT ppx - Eliquis  -neg for b/l LE DVTs at [facility]

## 2021-06-27 LAB
GLUCOSE BLDC GLUCOMTR-MCNC: 144 MG/DL — HIGH (ref 70–99)
GLUCOSE BLDC GLUCOMTR-MCNC: 152 MG/DL — HIGH (ref 70–99)
GLUCOSE BLDC GLUCOMTR-MCNC: 158 MG/DL — HIGH (ref 70–99)
GLUCOSE BLDC GLUCOMTR-MCNC: 99 MG/DL — SIGNIFICANT CHANGE UP (ref 70–99)

## 2021-06-27 PROCEDURE — 99232 SBSQ HOSP IP/OBS MODERATE 35: CPT

## 2021-06-27 RX ADMIN — Medication 25 MILLIGRAM(S): at 15:02

## 2021-06-27 RX ADMIN — Medication 1 PATCH: at 21:48

## 2021-06-27 RX ADMIN — APIXABAN 2.5 MILLIGRAM(S): 2.5 TABLET, FILM COATED ORAL at 05:13

## 2021-06-27 RX ADMIN — Medication 50 MILLIGRAM(S): at 05:13

## 2021-06-27 RX ADMIN — Medication 50 MILLIGRAM(S): at 17:32

## 2021-06-27 RX ADMIN — INSULIN GLARGINE 8 UNIT(S): 100 INJECTION, SOLUTION SUBCUTANEOUS at 21:47

## 2021-06-27 RX ADMIN — ATORVASTATIN CALCIUM 80 MILLIGRAM(S): 80 TABLET, FILM COATED ORAL at 21:46

## 2021-06-27 RX ADMIN — Medication 1 PATCH: at 11:06

## 2021-06-27 RX ADMIN — POLYETHYLENE GLYCOL 3350 17 GRAM(S): 17 POWDER, FOR SOLUTION ORAL at 11:04

## 2021-06-27 RX ADMIN — Medication 50 MILLIGRAM(S): at 21:46

## 2021-06-27 RX ADMIN — CITALOPRAM 20 MILLIGRAM(S): 10 TABLET, FILM COATED ORAL at 11:04

## 2021-06-27 RX ADMIN — Medication 1: at 11:03

## 2021-06-27 RX ADMIN — SENNA PLUS 2 TABLET(S): 8.6 TABLET ORAL at 21:47

## 2021-06-27 RX ADMIN — APIXABAN 2.5 MILLIGRAM(S): 2.5 TABLET, FILM COATED ORAL at 17:31

## 2021-06-27 RX ADMIN — Medication 1 PATCH: at 07:20

## 2021-06-27 RX ADMIN — Medication 25 MILLIGRAM(S): at 21:47

## 2021-06-27 RX ADMIN — GABAPENTIN 600 MILLIGRAM(S): 400 CAPSULE ORAL at 21:47

## 2021-06-27 RX ADMIN — Medication 1 PATCH: at 11:04

## 2021-06-27 RX ADMIN — Medication 25 MILLIGRAM(S): at 05:14

## 2021-06-27 RX ADMIN — AMLODIPINE BESYLATE 10 MILLIGRAM(S): 2.5 TABLET ORAL at 05:13

## 2021-06-27 NOTE — PROGRESS NOTE ADULT - ASSESSMENT
54 y/o M with PMHx of HTN, T2DM, HLD, depression, and prior CVA with no residual deficits, who presented to [facility] on 06/11/2021 with dysphagia and difficulty ambulating; found to have an acute right corona radiata infarct. Admitted for multidisciplinary rehab program.    #Acute corona radiata infarct and multiple b/l subacute infarcts  -Continue comprehensive rehab program - PT/OT/SLP per rehab team  -Pain management, bowel regimen per rehab   -Eliquis 2.5mg q12h and atorvastatin 80mg qhs    #JOSE, baseline Cr unknown - probably CKD3  -BUN/Cr 35/2.55, per chart review BUN/Cr 24/2.11 on 6/20  -Some improvement noted after IVF. Encourage PO hydration  -Avoid nephrotoxins, monitor bmp   -Nephro consult  -Renal sono reviewed -no b/l hydro, renal mass, or calculi visualized     #HLD  #HTN - improving  -Amlodipine 10mg qd, labetalol 50mg q12h  -Lipitor 80mg qhs  -Hydralazine 25mg TID (6/26)    #T2DM, A1C 5.9 - controlled  - FS and ISS  - DC home on Metformin 500mg BID, Glipizide 5mg daily, and Sitagliptin 50mg daily  - Lantus 8un qhs while in rehab    #Neuropathy  -Gabapentin 600mg qhs    #Chronic Tobacco use  -Nicotine 21mg/24hr  -Smoking cessation counseling provided    #Mood/Depression  -Citalopram 20mg qd    #DVT ppx - Eliquis  -neg for b/l LE DVTs at [facility]

## 2021-06-27 NOTE — PROGRESS NOTE ADULT - SUBJECTIVE AND OBJECTIVE BOX
Patient is a 55y old  Male who presents with a chief complaint of Acute right corona radiata CVA (27 Jun 2021 09:50)      HPI:  MARK ANTHONY QUINTANA is a 55M with PMHx of HTN, DM, HLD, ,depression, and prior CVA with no residual deficits, who presented to [facility] on 06/11/2021 with several days of dysphagia and difficulty ambulating. Patient had been experiencing symptoms since 6/8 after coming home from a dental appointment. He was brought to the hospital after a wellness check from his insurance company. In the ED, his Utox tested positive for cocaine, though patient denied use, and CT head scan showed findings c/w an acute right corona radiata infarct, along with multiple b/l subacute infarcts. Patient was managed medically, as he was not a candidate for either tPA or thrombectomy.    Patient was evaluated by PM&R and therapy for functional deficits and gait/ ADL impairments and recommended acute rehabilitation. Patient was medically optimized for discharge to [facility] on 06/21/2021. Patient was seen and examined at the bedside upon arrival.  (21 Jun 2021 15:04)    denies complaint. Hydralazine increased yesterday, bp better controlled today. appreciate hospitalist input.     REVIEW OF SYSTEMS  denies cp or sob    PAST MEDICAL & SURGICAL HISTORY  Cerebrovascular accident (CVA)    Hypertension    Hyperlipidemia    Diabetes mellitus    Depression       VITALS  T(C): 36.7 (06-27-21 @ 07:20), Max: 36.7 (06-26-21 @ 21:10)  HR: 65 (06-27-21 @ 07:20) (61 - 73)  BP: 148/90 (06-27-21 @ 07:20) (144/83 - 161/86)  RR: 14 (06-27-21 @ 07:20) (14 - 15)  SpO2: 96% (06-27-21 @ 07:20) (96% - 98%)  Wt(kg): --    ALLERGIES  No Known Allergies      MEDICATIONS   acetaminophen    Suspension .. 650 milliGRAM(s) Oral every 6 hours PRN  amLODIPine   Tablet 10 milliGRAM(s) Oral daily  apixaban 2.5 milliGRAM(s) Oral every 12 hours  atorvastatin 80 milliGRAM(s) Oral at bedtime  bisacodyl 5 milliGRAM(s) Oral every 12 hours PRN  citalopram 20 milliGRAM(s) Oral daily  dextrose 40% Gel 15 Gram(s) Oral once  dextrose 5%. 1000 milliLiter(s) IV Continuous <Continuous>  dextrose 5%. 1000 milliLiter(s) IV Continuous <Continuous>  dextrose 50% Injectable 25 Gram(s) IV Push once  dextrose 50% Injectable 12.5 Gram(s) IV Push once  dextrose 50% Injectable 25 Gram(s) IV Push once  gabapentin 600 milliGRAM(s) Oral at bedtime  glucagon  Injectable 1 milliGRAM(s) IntraMuscular once  hydrALAZINE 25 milliGRAM(s) Oral every 8 hours  insulin glargine Injectable (LANTUS) 8 Unit(s) SubCutaneous at bedtime  insulin lispro (ADMELOG) corrective regimen sliding scale   SubCutaneous three times a day before meals  insulin lispro (ADMELOG) corrective regimen sliding scale   SubCutaneous at bedtime  labetalol 50 milliGRAM(s) Oral two times a day  nicotine - 21 mG/24Hr(s) Patch 1 patch Transdermal daily  polyethylene glycol 3350 17 Gram(s) Oral daily  senna 2 Tablet(s) Oral at bedtime  traZODone 50 milliGRAM(s) Oral at bedtime      ----------------------------------------------------------------------------------------  PHYSICAL EXAM  Constitutional - NAD, Comfortable  HEENT - NCAT  Chest - CTAB   Cardio - warm and well perfused, RRR, no murmur  Abdomen -  Soft, NTND, (+) BS  Extremities - No peripheral edema, No calf tenderness   Neurologic Exam:                    Cognitive -             Orientation: drowsy but O x4     Speech - (+) mild dysarthria,  No aphasia     Cranial Nerves - Right pupil sluggish, left pupil reactive, Visual fields slightly impaired on right, flattening of right NLF, EOMI, Shoulder shrug intact, +dysarthria, +dysphagia     Motor -                      LEFT    UE - ShAB 5/5, EF 5/5, EE 4/5, WE 5/5,  WNL                    RIGHT UE - ShAB 5/5, EF 5/5, EE 4/5, WE 5/5,  WNL                    LEFT    LE - HF 5/5, KE 5/5, DF 5/5, PF 5/5                    RIGHT LE - HF 4/5, KE 4/5, DF 4/5, PF 4/5        Sensory - diminished sensation in dorsum and plantar aspect of right foot     Coordination - FTN intact & HTS slightly impaired on right  Psychiatric - mood stable, appropriate   ----------------------------------------------------------------------------------------  ASSESSMENT/PLAN   MARK ANTHONY QUINTANA is a 55M with PMHx of HTN, DM, HLD, ,depression, and prior CVA with no residual deifcits, who presented to [facility] on 06/11/2021 with dysphagia and difficulty ambulating, & dysarthria found to have an acute right corona radiata infarct. Admitted for multidisciplinary rehab program.      #Comprehensive Multidisciplinary Rehab Program:  - Gait, ADL, Functional impairments  - PT/OT/ SLP 3 hours a day 5 days a week    #Acute corona radiata infarct and multiple b/l subacute infarcts  - distribution of b/l subacute infarcts suggestive of cardioembolic origin  - not a candidate for tPA or thrombectomy  - c/w Eliquis 2.5mg q12h and atorvastatin 80mg qhs    #HLD  #HTN  - c/w amlodipine 10mg daily and labetalol  50mg q12h  - hydralazine increased to 25mg TID yesterday by hospitalist  - c/w atorvastatin    #CKD  - encourage PO intake   - bmp tomomorrow    #DM  - FS and ISS  - DC home on Metformin 500mg BID, Glipizide 5mg daily, and Sitagliptin 50mg daily    #Neuropathy  - c/w gabapentin 600mg qhs    #Tobacco use  - Nicotine 21mg/24hr    #Mood/Depression  - c/w Citalopram 20mg daily    #Sleep:  - Maintain quiet hours and low stim environment  - cont.  trazodone    #Pain:  - Tylenol PRN  - avoid sedating meds that may affect cognitive recovery    #GI/Bowel:  - Senna 2 tabs qhs and Miralax PRN  - PO Dulcolax PRN    #/Bladder:  - continent      #Skin/ Pressure Injury Prevention:  - assessment on admission   - Turn Q2hrs in bed while awake, OOB to Chair, PT/OT/SLP     #DVT prophylaxis:  - Eliquis  - SCDs  - neg for b/l LE DVTs at [facility]    #Precautions/ Restrictions  - Falls  - Lungs: Aspiration, Incentive Spirometer    - COVID PCR: neg 6/11    #Dispo: IDR 06/24/2021  - OT: setup/supervision eating/UBD; min assist grooming/toilet transfer/LBD; mod assist bathing  - PT: min assist transfer; mod assist ambulation with RW and WC follow; mod assist 4 steps with 2 hand rails  - SLP: mod-severe cog deficits; (+) dysarthria and dysphagia; soft/nectar diet; MBS this week  - Goals: Oralia transfers, supervision ambulation 150', supervision 12 steps, oralia eating/grooming/UBD, supervision LBD/bathing/functional transfers  - TDD: DC home 7/9    #LABs  - CBC BMP 6/28

## 2021-06-27 NOTE — PROGRESS NOTE ADULT - SUBJECTIVE AND OBJECTIVE BOX
Patient is a 55y old  Male who presents with a chief complaint of Acute right corona radiata CVA (2021 15:00)    Patient seen and examined at bedside. sleepy, no acute medical complaints. BP better controlled.     ALLERGIES:  No Known Allergies    MEDICATIONS  (STANDING):  amLODIPine   Tablet 10 milliGRAM(s) Oral daily  apixaban 2.5 milliGRAM(s) Oral every 12 hours  atorvastatin 80 milliGRAM(s) Oral at bedtime  citalopram 20 milliGRAM(s) Oral daily  dextrose 40% Gel 15 Gram(s) Oral once  dextrose 5%. 1000 milliLiter(s) (50 mL/Hr) IV Continuous <Continuous>  dextrose 5%. 1000 milliLiter(s) (100 mL/Hr) IV Continuous <Continuous>  dextrose 50% Injectable 25 Gram(s) IV Push once  dextrose 50% Injectable 12.5 Gram(s) IV Push once  dextrose 50% Injectable 25 Gram(s) IV Push once  gabapentin 600 milliGRAM(s) Oral at bedtime  glucagon  Injectable 1 milliGRAM(s) IntraMuscular once  hydrALAZINE 25 milliGRAM(s) Oral every 12 hours  insulin glargine Injectable (LANTUS) 8 Unit(s) SubCutaneous at bedtime  insulin lispro (ADMELOG) corrective regimen sliding scale   SubCutaneous three times a day before meals  insulin lispro (ADMELOG) corrective regimen sliding scale   SubCutaneous at bedtime  labetalol 50 milliGRAM(s) Oral two times a day  nicotine - 21 mG/24Hr(s) Patch 1 patch Transdermal daily  polyethylene glycol 3350 17 Gram(s) Oral daily  senna 2 Tablet(s) Oral at bedtime  traZODone 50 milliGRAM(s) Oral at bedtime    MEDICATIONS  (PRN):  acetaminophen    Suspension .. 650 milliGRAM(s) Oral every 6 hours PRN Temp greater or equal to 38C (100.4F), Mild Pain (1 - 3)  bisacodyl 5 milliGRAM(s) Oral every 12 hours PRN Constipation    Vital Signs Last 24 Hrs  T(C): 36.7 (2021 07:20), Max: 36.7 (2021 21:10)  T(F): 98 (2021 07:20), Max: 98 (2021 21:10)  HR: 65 (2021 07:20) (61 - 73)  BP: 148/90 (2021 07:20) (144/83 - 161/86)  BP(mean): --  RR: 14 (2021 07:20) (14 - 15)  SpO2: 96% (2021 07:20) (96% - 98%)  I&O's Summary    2021 07:01  -  2021 07:00  --------------------------------------------------------  IN: 650 mL / OUT: 900 mL / NET: -250 mL      BMI (kg/m2): 30.8 (21 @ 01:00)    PHYSICAL EXAM:  General: NAD, drowsy  ENT: MMM, no scleral icterus  Neck: Supple, No JVD  Lungs: Respirations unlabored. CTA b/l, diminished at bases  Cardio: RRR, S1/S2  Abdomen: Soft, Nontender, Nondistended; Bowel sounds present  Extremities: No calf tenderness, No pitting edema LE b/l      LABS:                        13.2   4.82  )-----------( 245      ( 2021 07:17 )             41.0           141  |  108  |  30  ----------------------------<  121  4.4   |  28  |  2.35    Ca    9.1      2021 09:30  Phos  4.0         TPro  x   /  Alb  2.6  /  TBili  x   /  DBili  x   /  AST  x   /  ALT  x   /  AlkPhos  x        eGFR if Non African American: 30 mL/min/1.73M2 (21 @ 09:30)  eGFR if African American: 35 mL/min/1.73M2 (21 @ 09:30)     POCT Blood Glucose.: 127 mg/dL (2021 07:36)  POCT Blood Glucose.: 195 mg/dL (2021 21:04)  POCT Blood Glucose.: 192 mg/dL (2021 16:55)  POCT Blood Glucose.: 178 mg/dL (2021 12:26)    Urinalysis Basic - ( 2021 18:30 )    Color: Yellow / Appearance: Clear / S.020 / pH: x  Gluc: x / Ketone: Negative  / Bili: Negative / Urobili: Negative   Blood: x / Protein: 500 mg/dL / Nitrite: Negative   Leuk Esterase: Negative / RBC: 0-4 /HPF / WBC Negative /HPF   Sq Epi: x / Non Sq Epi: Neg.-Few / Bacteria: Negative /HPF    COVID-19 PCR: NotDetec (21 @ 23:30)    RADIOLOGY & ADDITIONAL TESTS: reviewed    Care Discussed with Consultants/Other Providers: yes - rehab

## 2021-06-28 LAB
ALBUMIN SERPL ELPH-MCNC: 2.8 G/DL — LOW (ref 3.3–5)
ALP SERPL-CCNC: 156 U/L — HIGH (ref 40–120)
ALT FLD-CCNC: 28 U/L — SIGNIFICANT CHANGE UP (ref 10–45)
ANION GAP SERPL CALC-SCNC: 5 MMOL/L — SIGNIFICANT CHANGE UP (ref 5–17)
AST SERPL-CCNC: 28 U/L — SIGNIFICANT CHANGE UP (ref 10–40)
BILIRUB DIRECT SERPL-MCNC: 0.1 MG/DL — SIGNIFICANT CHANGE UP (ref 0–0.2)
BILIRUB INDIRECT FLD-MCNC: 0.3 MG/DL — SIGNIFICANT CHANGE UP (ref 0.2–1)
BILIRUB SERPL-MCNC: 0.4 MG/DL — SIGNIFICANT CHANGE UP (ref 0.2–1.2)
BUN SERPL-MCNC: 41 MG/DL — HIGH (ref 7–23)
CALCIUM SERPL-MCNC: 8.8 MG/DL — SIGNIFICANT CHANGE UP (ref 8.4–10.5)
CHLORIDE SERPL-SCNC: 104 MMOL/L — SIGNIFICANT CHANGE UP (ref 96–108)
CO2 SERPL-SCNC: 28 MMOL/L — SIGNIFICANT CHANGE UP (ref 22–31)
CREAT SERPL-MCNC: 2.31 MG/DL — HIGH (ref 0.5–1.3)
GLUCOSE BLDC GLUCOMTR-MCNC: 158 MG/DL — HIGH (ref 70–99)
GLUCOSE BLDC GLUCOMTR-MCNC: 162 MG/DL — HIGH (ref 70–99)
GLUCOSE BLDC GLUCOMTR-MCNC: 173 MG/DL — HIGH (ref 70–99)
GLUCOSE BLDC GLUCOMTR-MCNC: 93 MG/DL — SIGNIFICANT CHANGE UP (ref 70–99)
GLUCOSE SERPL-MCNC: 129 MG/DL — HIGH (ref 70–99)
HCT VFR BLD CALC: 39.9 % — SIGNIFICANT CHANGE UP (ref 39–50)
HGB BLD-MCNC: 13.1 G/DL — SIGNIFICANT CHANGE UP (ref 13–17)
MCHC RBC-ENTMCNC: 30.6 PG — SIGNIFICANT CHANGE UP (ref 27–34)
MCHC RBC-ENTMCNC: 32.8 GM/DL — SIGNIFICANT CHANGE UP (ref 32–36)
MCV RBC AUTO: 93.2 FL — SIGNIFICANT CHANGE UP (ref 80–100)
NRBC # BLD: 0 /100 WBCS — SIGNIFICANT CHANGE UP (ref 0–0)
PLATELET # BLD AUTO: 271 K/UL — SIGNIFICANT CHANGE UP (ref 150–400)
POTASSIUM SERPL-MCNC: 5.1 MMOL/L — SIGNIFICANT CHANGE UP (ref 3.5–5.3)
POTASSIUM SERPL-SCNC: 5.1 MMOL/L — SIGNIFICANT CHANGE UP (ref 3.5–5.3)
PROT SERPL-MCNC: 7.1 G/DL — SIGNIFICANT CHANGE UP (ref 6–8.3)
RBC # BLD: 4.28 M/UL — SIGNIFICANT CHANGE UP (ref 4.2–5.8)
RBC # FLD: 11.3 % — SIGNIFICANT CHANGE UP (ref 10.3–14.5)
SODIUM SERPL-SCNC: 137 MMOL/L — SIGNIFICANT CHANGE UP (ref 135–145)
WBC # BLD: 5.32 K/UL — SIGNIFICANT CHANGE UP (ref 3.8–10.5)
WBC # FLD AUTO: 5.32 K/UL — SIGNIFICANT CHANGE UP (ref 3.8–10.5)

## 2021-06-28 PROCEDURE — 99232 SBSQ HOSP IP/OBS MODERATE 35: CPT

## 2021-06-28 PROCEDURE — 96116 NUBHVL XM PHYS/QHP 1ST HR: CPT

## 2021-06-28 RX ADMIN — APIXABAN 2.5 MILLIGRAM(S): 2.5 TABLET, FILM COATED ORAL at 05:09

## 2021-06-28 RX ADMIN — Medication 25 MILLIGRAM(S): at 21:10

## 2021-06-28 RX ADMIN — GABAPENTIN 600 MILLIGRAM(S): 400 CAPSULE ORAL at 21:10

## 2021-06-28 RX ADMIN — Medication 25 MILLIGRAM(S): at 05:10

## 2021-06-28 RX ADMIN — ATORVASTATIN CALCIUM 80 MILLIGRAM(S): 80 TABLET, FILM COATED ORAL at 21:10

## 2021-06-28 RX ADMIN — Medication 50 MILLIGRAM(S): at 21:10

## 2021-06-28 RX ADMIN — Medication 50 MILLIGRAM(S): at 05:09

## 2021-06-28 RX ADMIN — Medication 1 PATCH: at 07:25

## 2021-06-28 RX ADMIN — Medication 1: at 16:34

## 2021-06-28 RX ADMIN — Medication 1 PATCH: at 12:33

## 2021-06-28 RX ADMIN — APIXABAN 2.5 MILLIGRAM(S): 2.5 TABLET, FILM COATED ORAL at 17:15

## 2021-06-28 RX ADMIN — Medication 50 MILLIGRAM(S): at 17:15

## 2021-06-28 RX ADMIN — INSULIN GLARGINE 8 UNIT(S): 100 INJECTION, SOLUTION SUBCUTANEOUS at 21:11

## 2021-06-28 RX ADMIN — POLYETHYLENE GLYCOL 3350 17 GRAM(S): 17 POWDER, FOR SOLUTION ORAL at 12:33

## 2021-06-28 RX ADMIN — CITALOPRAM 20 MILLIGRAM(S): 10 TABLET, FILM COATED ORAL at 12:33

## 2021-06-28 RX ADMIN — AMLODIPINE BESYLATE 10 MILLIGRAM(S): 2.5 TABLET ORAL at 05:10

## 2021-06-28 RX ADMIN — SENNA PLUS 2 TABLET(S): 8.6 TABLET ORAL at 21:10

## 2021-06-28 RX ADMIN — Medication 1: at 11:13

## 2021-06-28 RX ADMIN — Medication 25 MILLIGRAM(S): at 13:40

## 2021-06-28 RX ADMIN — Medication 1 PATCH: at 19:45

## 2021-06-28 RX ADMIN — Medication 1 PATCH: at 12:32

## 2021-06-28 NOTE — PROGRESS NOTE ADULT - SUBJECTIVE AND OBJECTIVE BOX
Patient is a 55y old  Male who presents with a chief complaint of Acute right corona radiata CVA (27 Jun 2021 10:25)    Patient seen and examined at bedside. drowsy but arousable., no acute medical complaints. denies pain.     ALLERGIES:  No Known Allergies    MEDICATIONS  (STANDING):  amLODIPine   Tablet 10 milliGRAM(s) Oral daily  apixaban 2.5 milliGRAM(s) Oral every 12 hours  atorvastatin 80 milliGRAM(s) Oral at bedtime  citalopram 20 milliGRAM(s) Oral daily  dextrose 40% Gel 15 Gram(s) Oral once  dextrose 5%. 1000 milliLiter(s) (50 mL/Hr) IV Continuous <Continuous>  dextrose 5%. 1000 milliLiter(s) (100 mL/Hr) IV Continuous <Continuous>  dextrose 50% Injectable 25 Gram(s) IV Push once  dextrose 50% Injectable 12.5 Gram(s) IV Push once  dextrose 50% Injectable 25 Gram(s) IV Push once  gabapentin 600 milliGRAM(s) Oral at bedtime  glucagon  Injectable 1 milliGRAM(s) IntraMuscular once  hydrALAZINE 25 milliGRAM(s) Oral every 8 hours  insulin glargine Injectable (LANTUS) 8 Unit(s) SubCutaneous at bedtime  insulin lispro (ADMELOG) corrective regimen sliding scale   SubCutaneous three times a day before meals  insulin lispro (ADMELOG) corrective regimen sliding scale   SubCutaneous at bedtime  labetalol 50 milliGRAM(s) Oral two times a day  nicotine - 21 mG/24Hr(s) Patch 1 patch Transdermal daily  polyethylene glycol 3350 17 Gram(s) Oral daily  senna 2 Tablet(s) Oral at bedtime  traZODone 50 milliGRAM(s) Oral at bedtime    MEDICATIONS  (PRN):  acetaminophen    Suspension .. 650 milliGRAM(s) Oral every 6 hours PRN Temp greater or equal to 38C (100.4F), Mild Pain (1 - 3)  bisacodyl 5 milliGRAM(s) Oral every 12 hours PRN Constipation    Vital Signs Last 24 Hrs  T(F): 97.7 (28 Jun 2021 07:28), Max: 98.2 (27 Jun 2021 20:38)  HR: 61 (28 Jun 2021 07:28) (61 - 66)  BP: 150/90 (28 Jun 2021 07:28) (149/88 - 159/90)  RR: 14 (28 Jun 2021 07:28) (14 - 14)  SpO2: 100% (28 Jun 2021 07:28) (97% - 100%)  I&O's Summary    27 Jun 2021 07:01  -  28 Jun 2021 07:00  --------------------------------------------------------  IN: 840 mL / OUT: 1370 mL / NET: -530 mL    PHYSICAL EXAM:  General: NAD, drowsy  ENT: MMM, no scleral icterus  Neck: Supple, No JVD  Lungs: Respirations unlabored. CTA b/l, diminished at bases  Cardio: RRR, S1/S2  Abdomen: Soft, Nontender, Nondistended; Bowel sounds present  Extremities: No calf tenderness, No pitting edema LE b/l    LABS:                        13.1   5.32  )-----------( 271      ( 28 Jun 2021 09:00 )             39.9       06-28    137  |  104  |  41  ----------------------------<  129  5.1   |  28  |  2.31    Ca    8.8      28 Jun 2021 09:00    TPro  7.1  /  Alb  2.8  /  TBili  0.4  /  DBili  0.1  /  AST  28  /  ALT  28  /  AlkPhos  156  06-28     eGFR if Non African American: 30 mL/min/1.73M2 (06-28-21 @ 09:00)  eGFR if : 35 mL/min/1.73M2 (06-28-21 @ 09:00)    POCT Blood Glucose.: 93 mg/dL (28 Jun 2021 07:24)  POCT Blood Glucose.: 158 mg/dL (27 Jun 2021 21:46)  POCT Blood Glucose.: 144 mg/dL (27 Jun 2021 16:42)    COVID-19 PCR: NotDetec (06-21-21 @ 23:30)    RADIOLOGY & ADDITIONAL TESTS: reviewed    Care Discussed with Consultants/Other Providers: yes - rehab

## 2021-06-28 NOTE — PROGRESS NOTE ADULT - ASSESSMENT
56 y/o M with PMHx of HTN, T2DM, HLD, depression, and prior CVA with no residual deficits, who presented to [facility] on 06/11/2021 with dysphagia and difficulty ambulating; found to have an acute right corona radiata infarct. Admitted for multidisciplinary rehab program.    #Acute corona radiata infarct and multiple b/l subacute infarcts  -Continue comprehensive rehab program - PT/OT/SLP per rehab team  -Pain management, bowel regimen per rehab   -Eliquis 2.5mg q12h and atorvastatin 80mg qhs    #JOSE, baseline Cr unknown - probably CKD3 - stable  -Some improvement noted after IVF. Encourage PO hydration  -Avoid nephrotoxins, monitor bmp   -Nephro consult  -Renal sono reviewed -no b/l hydro, renal mass, or calculi visualized     #HLD  #HTN - improving  -Amlodipine 10mg qd, labetalol 50mg q12h  -Lipitor 80mg qhs  -Hydralazine 25mg TID (6/26)    #T2DM, A1C 5.9 - controlled  - FS and ISS  - DC home on Metformin 500mg BID, Glipizide 5mg daily, and Sitagliptin 50mg daily  - Lantus 8un qhs while in rehab    #Neuropathy  -Gabapentin 600mg qhs    #Chronic Tobacco use  -Nicotine 21mg/24hr  -Smoking cessation counseling provided    #Mood/Depression  -Citalopram 20mg qd    #Moderate protein-calorie malnutrition   -Dietary/nutrition appreciated     #DVT ppx - Eliquis  -neg for b/l LE DVTs at [facility]

## 2021-06-28 NOTE — CONSULT NOTE ADULT - SUBJECTIVE AND OBJECTIVE BOX
Pt is a 56 y/o right-handed male with PMHx of HTN, DM, HLD, ,depression, and prior CVA with no residual deficits, who presented to [facility] on 06/11/2021 with several days of dysphagia and difficulty ambulating. Pt had been experiencing symptoms since 6/8 after coming home from a dental appointment. He was brought to the hospital after a wellness check from his insurance company. In the ED, his Utox tested positive for cocaine, though patient denied use, and CT head scan showed findings c/w an acute right corona radiata infarct, along with multiple b/l subacute infarcts. P was managed medically, as he was not a candidate for either tPA or thrombectomy. Pt was evaluated by PM&R and therapy for functional deficits and gait/ ADL impairments and recommended acute rehabilitation. Pt was medically optimized for discharge to [facility] on 06/21/2021. PMHx:  As noted above. Current meds: Please see list in Pt’s chart. Social Hx: Pt is single and has an adult daughter. 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 He lacks hx of mental illness. He denied hx of alcohol or drugs, but admitted to smoking 1/2 pack of cigarettes per day. He is not Cheondoism. Pt enjoys hanging out with friends and listening to music.    Findings: Pt was seen for an initial assessment of his cognitive and emotional functioning. MoCA was administered; Pt’s results (11/30) were in the Moderately Impaired range. His scores in mood measures suggested levels of anxiety and depression (GAD7 = 13/21; PHQ9 = 5/27). Pt was alert, partly Ox3 (disoriented to day of the week/month), and cooperative. Attn/Conc: Simple auditory attention - impaired. Sustained auditory attention - impaired. Concentration - impaired. Working memory for calculations – significantly impaired (0/5 serial 7s).	 Language: Pt’s speech was of normal volume, pitch and pace, but somewhat slurred. Naming - moderatley impaired (1/3). Sentence repetition - intact. Phonemic fluency - moderately impaired. Memory: Encoding of 5 words was mildly impaired (3/5); delayed verbal recall – significantly impaired (0/5, improving to 4/5 with cueing). LTM was intact for [place] presidents (4/4). Visual memory – mildly impaired. Visuospatial: Visuomotor integration – not assessed, impeded by right hemiparesis, Executive Functions: Set-shifting - impaired. Organizational skills - mildly impaired. Abstract reasoning - mildly impaired. Initiation - moderately impaired. Self-awareness - fair. Verbal problem solving – mildly impaired. Emotional functioning: Affect - constricted, calm. Mood - euthymic ("good"); Pt reported experiencing sadness, worry, poor sleep, and decreased energy. On mood measures he additionally reported very frequent sad mood, worry, irritability, and catastrophization; frequent anhedonia, anxiety and restlessness.  Thought processes were goal-directed.  No abnormal thought contents were observed.  Pt denied any AH/VH. Pt also denied SI/HI/I/P. Insight - WFL. Judgment - fair.

## 2021-06-28 NOTE — PROGRESS NOTE ADULT - SUBJECTIVE AND OBJECTIVE BOX
HPI:  MARK ANTHONY QUINTANA is a 55M with PMHx of HTN, DM, HLD, ,depression, and prior CVA with no residual deficits, who presented to [facility] on 06/11/2021 with several days of dysphagia and difficulty ambulating. Patient had been experiencing symptoms since 6/8 after coming home from a dental appointment. He was brought to the hospital after a wellness check from his insurance company. In the ED, his Utox tested positive for cocaine, though patient denied use, and CT head scan showed findings c/w an acute right corona radiata infarct, along with multiple b/l subacute infarcts. Patient was managed medically, as he was not a candidate for either tPA or thrombectomy.    Patient was evaluated by PM&R and therapy for functional deficits and gait/ ADL impairments and recommended acute rehabilitation. Patient was medically optimized for discharge to [facility] on 06/21/2021. Patient was seen and examined at the bedside upon arrival.  (21 Jun 2021 15:04)      PAST MEDICAL & SURGICAL HISTORY:  Cerebrovascular accident (CVA)    Hypertension    Hyperlipidemia    Diabetes mellitus    Depression        Subjective:  Pt. states did not sleep until late as was up watching tv.  Denies pain.        VITALS  Vital Signs Last 24 Hrs  T(C): 36.5 (28 Jun 2021 07:28), Max: 36.8 (27 Jun 2021 20:38)  T(F): 97.7 (28 Jun 2021 07:28), Max: 98.2 (27 Jun 2021 20:38)  HR: 61 (28 Jun 2021 07:28) (61 - 66)  BP: 150/90 (28 Jun 2021 07:28) (149/88 - 159/90)  BP(mean): --  RR: 14 (28 Jun 2021 07:28) (14 - 14)  SpO2: 100% (28 Jun 2021 07:28) (97% - 100%)    REVIEW OF SYMPTOMS  Neurological deficits    PHYSICAL EXAM  Constitutional - NAD, Comfortable  HEENT - NCAT  Chest - CTAB   Cardio - warm and well perfused, RRR, no murmur  Abdomen -  Soft, NTND, (+) BS  Extremities - No peripheral edema, No calf tenderness   Neurologic Exam:                    Cognitive -             Orientation:AA O x4     Speech - (+) mild dysarthria,  No aphasia     Cranial Nerves - Right pupil sluggish, left pupil reactive, Visual fields slightly impaired on right, flattening of right NLF, EOMI, Shoulder shrug intact, +dysarthria, +dysphagia     Motor -                      LEFT    UE - ShAB 5/5, EF 5/5, EE 4/5, WE 5/5,  WNL                    RIGHT UE - ShAB 5/5, EF 5/5, EE 4/5, WE 5/5,  WNL                    LEFT    LE - HF 5/5, KE 5/5, DF 5/5, PF 5/5                    RIGHT LE - HF 4/5, KE 4/5, DF 4/5, PF 4/5        Sensory - diminished sensation in dorsum and plantar aspect of right foot     Coordination - FTN intact & HTS slightly impaired on right  Psychiatric - mood stable, appropriate    RECENT LABS                        13.1   5.32  )-----------( 271      ( 28 Jun 2021 09:00 )             39.9     06-28    137  |  104  |  41<H>  ----------------------------<  129<H>  5.1   |  28  |  2.31<H>    Ca    8.8      28 Jun 2021 09:00    TPro  7.1  /  Alb  2.8<L>  /  TBili  0.4  /  DBili  0.1  /  AST  28  /  ALT  28  /  AlkPhos  156<H>  06-28            RADIOLOGY/OTHER RESULTS      MEDICATIONS  (STANDING):  amLODIPine   Tablet 10 milliGRAM(s) Oral daily  apixaban 2.5 milliGRAM(s) Oral every 12 hours  atorvastatin 80 milliGRAM(s) Oral at bedtime  citalopram 20 milliGRAM(s) Oral daily  dextrose 40% Gel 15 Gram(s) Oral once  dextrose 5%. 1000 milliLiter(s) (50 mL/Hr) IV Continuous <Continuous>  dextrose 5%. 1000 milliLiter(s) (100 mL/Hr) IV Continuous <Continuous>  dextrose 50% Injectable 25 Gram(s) IV Push once  dextrose 50% Injectable 12.5 Gram(s) IV Push once  dextrose 50% Injectable 25 Gram(s) IV Push once  gabapentin 600 milliGRAM(s) Oral at bedtime  glucagon  Injectable 1 milliGRAM(s) IntraMuscular once  hydrALAZINE 25 milliGRAM(s) Oral every 8 hours  insulin glargine Injectable (LANTUS) 8 Unit(s) SubCutaneous at bedtime  insulin lispro (ADMELOG) corrective regimen sliding scale   SubCutaneous three times a day before meals  insulin lispro (ADMELOG) corrective regimen sliding scale   SubCutaneous at bedtime  labetalol 50 milliGRAM(s) Oral two times a day  nicotine - 21 mG/24Hr(s) Patch 1 patch Transdermal daily  polyethylene glycol 3350 17 Gram(s) Oral daily  senna 2 Tablet(s) Oral at bedtime  traZODone 50 milliGRAM(s) Oral at bedtime    MEDICATIONS  (PRN):  acetaminophen    Suspension .. 650 milliGRAM(s) Oral every 6 hours PRN Temp greater or equal to 38C (100.4F), Mild Pain (1 - 3)  bisacodyl 5 milliGRAM(s) Oral every 12 hours PRN Constipation

## 2021-06-28 NOTE — PROGRESS NOTE ADULT - ASSESSMENT
MARK ANTHONY QUINTANA is a 55M with PMHx of HTN, DM, HLD, ,depression, and prior CVA with no residual deifcits, who presented to [facility] on 06/11/2021 with dysphagia and difficulty ambulating, & dysarthria found to have an acute right corona radiata infarct. Admitted for multidisciplinary rehab program.      #Comprehensive Multidisciplinary Rehab Program:  - Gait, ADL, Functional impairments  - PT/OT/ SLP 3 hours a day 5 days a week    #Acute corona radiata infarct and multiple b/l subacute infarcts  - distribution of b/l subacute infarcts suggestive of cardioembolic origin  - not a candidate for tPA or thrombectomy  - c/w Eliquis 2.5mg q12h and atorvastatin 80mg qhs    #HLD  #HTN  --BP improved today-- d/w hospitalist-- cont. current medications  - c/w amlodipine 10mg daily and labetalol  50mg q12h  - cont. hydralazine  25mg TID   - c/w atorvastatin    #CKD  - Cr improving-- 2.31 today  - encourage adequate PO hydration    #DM  - FS and ISS  - DC home on Metformin 500mg BID, Glipizide 5mg daily, and Sitagliptin 50mg daily    #Neuropathy  - c/w gabapentin 600mg qhs    #Tobacco use  - Nicotine 21mg/24hr    #Mood/Depression  - c/w Citalopram 20mg daily    #Sleep:  - Maintain quiet hours and low stim environment;  --nursing order to turn off TV at 9PM to encourage proper sleep hygiene  - cont.  trazodone    #Pain:  - Tylenol PRN  - avoid sedating meds that may affect cognitive recovery    #GI/Bowel:  - Senna 2 tabs qhs and Miralax PRN  - PO Dulcolax PRN    #/Bladder:  - continent      #Skin/ Pressure Injury Prevention:  - assessment on admission   - Turn Q2hrs in bed while awake, OOB to Chair, PT/OT/SLP     #DVT prophylaxis:  - Eliquis  - SCDs  - neg for b/l LE DVTs at [facility]    #Precautions/ Restrictions  - Falls  - Lungs: Aspiration, Incentive Spirometer    - COVID PCR: neg 6/11    #Dispo: IDR 06/24/2021  - OT: setup/supervision eating/UBD; min assist grooming/toilet transfer/LBD; mod assist bathing  - PT: min assist transfer; mod assist ambulation with RW and WC follow; mod assist 4 steps with 2 hand rails  - SLP: mod-severe cog deficits; (+) dysarthria and dysphagia; soft/nectar diet; MBS this week  - Goals: Oralia transfers, supervision ambulation 150', supervision 12 steps, oralia eating/grooming/UBD, supervision LBD/bathing/functional transfers  - TDD: PR home 7/9    #LABs  - [facility] 6/28    --------------------------------------------  Outpatient Follow up:  Neurology - Dr. Nena Dumont on 07/22/2021 @ 11AM via telehealth  PCP  ----------------------------------------

## 2021-06-28 NOTE — CONSULT NOTE ADULT - ASSESSMENT
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 He exhibited difficulties in simple/sustained auditory attention, concentration, working memory for calculations, short-term retrieval of verbal infomration, visuospatial skills as well sas aspects of language and executive functions (i.e., set-shifting, organizational skills, abstract reasoning, and problem solving). FIM scores: Social Interaction 4; Problem Solving ; Memory .  Plan: Individual supportive psychotherapy to monitor cognition, affect/mood, and behavior. Pt will benefit from continuation of his current antidepressant medication, Celexa. Cognitive remediation during speech therapy sessions. Participation in recreation/art therapy in order to have pleasure and mastery experiences and regain/reestablish a sense of routine.     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 He exhibited difficulties in simple/sustained auditory attention, concentration, working memory for calculations, short-term retrieval of verbal infomration, visuospatial skills as well sas aspects of language and executive functions (i.e., set-shifting, organizational skills, abstract reasoning, and problem solving). FIM scores: Social Interaction 4; Problem Solving ; Memory .  Plan: Individual supportive psychotherapy to monitor cognition, affect/mood, and behavior. Pt will benefit from continuation of his current antidepressant medication, Celexa. Cognitive remediation during speech therapy sessions. Participation in recreation/art therapy in order to have pleasure and mastery experiences and regain/reestablish a sense of routine.     Time spent with Pt, 45 min.

## 2021-06-29 ENCOUNTER — TRANSCRIPTION ENCOUNTER (OUTPATIENT)
Age: 56
End: 2021-06-29

## 2021-06-29 LAB
GLUCOSE BLDC GLUCOMTR-MCNC: 121 MG/DL — HIGH (ref 70–99)
GLUCOSE BLDC GLUCOMTR-MCNC: 131 MG/DL — HIGH (ref 70–99)
GLUCOSE BLDC GLUCOMTR-MCNC: 143 MG/DL — HIGH (ref 70–99)
GLUCOSE BLDC GLUCOMTR-MCNC: 243 MG/DL — HIGH (ref 70–99)

## 2021-06-29 PROCEDURE — 99232 SBSQ HOSP IP/OBS MODERATE 35: CPT

## 2021-06-29 RX ADMIN — Medication 1 PATCH: at 12:00

## 2021-06-29 RX ADMIN — Medication 25 MILLIGRAM(S): at 05:27

## 2021-06-29 RX ADMIN — Medication 25 MILLIGRAM(S): at 13:26

## 2021-06-29 RX ADMIN — Medication 1 PATCH: at 08:36

## 2021-06-29 RX ADMIN — APIXABAN 2.5 MILLIGRAM(S): 2.5 TABLET, FILM COATED ORAL at 05:28

## 2021-06-29 RX ADMIN — CITALOPRAM 20 MILLIGRAM(S): 10 TABLET, FILM COATED ORAL at 12:02

## 2021-06-29 RX ADMIN — Medication 1 PATCH: at 12:02

## 2021-06-29 RX ADMIN — POLYETHYLENE GLYCOL 3350 17 GRAM(S): 17 POWDER, FOR SOLUTION ORAL at 12:02

## 2021-06-29 RX ADMIN — Medication 25 MILLIGRAM(S): at 21:02

## 2021-06-29 RX ADMIN — SENNA PLUS 2 TABLET(S): 8.6 TABLET ORAL at 21:02

## 2021-06-29 RX ADMIN — Medication 1 PATCH: at 18:12

## 2021-06-29 RX ADMIN — Medication 50 MILLIGRAM(S): at 17:24

## 2021-06-29 RX ADMIN — AMLODIPINE BESYLATE 10 MILLIGRAM(S): 2.5 TABLET ORAL at 05:27

## 2021-06-29 RX ADMIN — Medication 50 MILLIGRAM(S): at 05:28

## 2021-06-29 RX ADMIN — GABAPENTIN 600 MILLIGRAM(S): 400 CAPSULE ORAL at 21:02

## 2021-06-29 RX ADMIN — Medication 50 MILLIGRAM(S): at 21:02

## 2021-06-29 RX ADMIN — ATORVASTATIN CALCIUM 80 MILLIGRAM(S): 80 TABLET, FILM COATED ORAL at 21:02

## 2021-06-29 RX ADMIN — APIXABAN 2.5 MILLIGRAM(S): 2.5 TABLET, FILM COATED ORAL at 17:24

## 2021-06-29 RX ADMIN — INSULIN GLARGINE 8 UNIT(S): 100 INJECTION, SOLUTION SUBCUTANEOUS at 21:03

## 2021-06-29 RX ADMIN — Medication 2: at 12:02

## 2021-06-29 NOTE — DISCHARGE NOTE PROVIDER - PROVIDER TOKENS
FREE:[LAST:[Tim],FIRST:[Nena],PHONE:[(   )    -],FAX:[(   )    -],SCHEDULEDAPPT:[07/22/2021],SCHEDULEDAPPTTIME:[12:00 AM]],FREE:[LAST:[Primary],FIRST:[Care],PHONE:[(   )    -],FAX:[(   )    -]],PROVIDER:[TOKEN:[[id number]]] PROVIDER:[TOKEN:[[id number]]],FREE:[LAST:[Tim],FIRST:[Nena],PHONE:[(   )    -],FAX:[(   )    -],SCHEDULEDAPPT:[07/22/2021],SCHEDULEDAPPTTIME:[11:00 PM]],FREE:[LAST:[Primary],FIRST:[Care],PHONE:[(   )    -],FAX:[(   )    -]],FREE:[LAST:[Nephrology [facility]],PHONE:[phone]],FAX:[(   )    -]],[facility]FIRST:[Shine],PHONE:[phone]:[(   )    -],ADDRESS:[[address] [place] [address]]]

## 2021-06-29 NOTE — DISCHARGE NOTE PROVIDER - NSDCMRMEDTOKEN_GEN_ALL_CORE_FT
acetaminophen 160 mg/5 mL oral suspension: 20.31 milliliter(s) orally every 6 hours, As needed, Temp greater or equal to 38C (100.4F), Mild Pain (1 - 3)  amLODIPine 10 mg oral tablet: 1 tab(s) orally   apixaban 2.5 mg oral tablet: 1 tab(s) orally every 12 hours  atorvastatin 80 mg oral tablet: 1 tab(s) orally once a day (at bedtime)  bisacodyl 5 mg oral delayed release tablet: 1 tab(s) orally every 12 hours, As needed, Constipation  citalopram 20 mg oral tablet: 1 tab(s) orally once a day  gabapentin 300 mg oral capsule: 2 cap(s) orally once a day (at bedtime)  hydrALAZINE 25 mg oral tablet: 3 tab(s) orally every 8 hours  insulin glargine:   labetalol 100 mg oral tablet: 1 tab(s) orally 2 times a day  nicotine 21 mg/24 hr transdermal film, extended release:  transdermal   polyethylene glycol 3350 oral powder for reconstitution: 17 gram(s) orally once a day  senna oral tablet: 2 tab(s) orally once a day (at bedtime)  traZODone 50 mg oral tablet: 1 tab(s) orally once a day (at bedtime)   acetaminophen 160 mg/5 mL oral suspension: 20.31 milliliter(s) orally every 6 hours, As needed, Temp greater or equal to 38C (100.4F), Mild Pain (1 - 3)  amLODIPine 10 mg oral tablet: 1 tab(s) orally   apixaban 2.5 mg oral tablet: 1 tab(s) orally every 12 hours  apixaban 2.5 mg oral tablet: 1 tab(s) orally every 12 hours  atorvastatin 80 mg oral tablet: 1 tab(s) orally once a day (at bedtime)  atorvastatin 80 mg oral tablet: 1 tab(s) orally once a day (at bedtime)  bisacodyl 5 mg oral delayed release tablet: 1 tab(s) orally every 12 hours, As needed, Constipation  citalopram 20 mg oral tablet: 1 tab(s) orally once a day  citalopram 20 mg oral tablet: 1 tab(s) orally once a day  gabapentin 300 mg oral capsule: 2 cap(s) orally once a day (at bedtime)  gabapentin 300 mg oral capsule: 2 cap(s) orally once a day (at bedtime)  hydrALAZINE 25 mg oral tablet: 3 tab(s) orally every 8 hours  insulin glargine:   Januvia 25 mg oral tablet: 1 tab(s) orally once a day   labetalol 100 mg oral tablet: 1 tab(s) orally 2 times a day  nicotine 21 mg/24 hr transdermal film, extended release:  transdermal   polyethylene glycol 3350 oral powder for reconstitution: 17 gram(s) orally once a day  senna oral tablet: 2 tab(s) orally once a day (at bedtime)  traZODone 50 mg oral tablet: 1 tab(s) orally once a day (at bedtime)  traZODone 50 mg oral tablet: 1 tab(s) orally once a day (at bedtime)   acetaminophen 160 mg/5 mL oral suspension: 20.31 milliliter(s) orally every 6 hours, As needed, Temp greater or equal to 38C (100.4F), Mild Pain (1 - 3)  albuterol 90 mcg/inh inhalation aerosol: 2 puff(s) inhaled every 6 hours, As Needed -Shortness of Breath and/or Wheezing - for shortness of breath and/or wheezing   amLODIPine 10 mg oral tablet: 1 tab(s) orally once a day (in the evening)   apixaban 2.5 mg oral tablet: 1 tab(s) orally every 12 hours  apixaban 2.5 mg oral tablet: 1 tab(s) orally every 12 hours  atorvastatin 80 mg oral tablet: 1 tab(s) orally once a day (at bedtime)  bisacodyl 5 mg oral delayed release tablet: 1 tab(s) orally every 12 hours, As needed, Constipation  citalopram 20 mg oral tablet: 1 tab(s) orally once a day  furosemide 40 mg oral tablet: 1 tab(s) orally once a day  gabapentin 300 mg oral capsule: 2 cap(s) orally once a day (at bedtime)  hydrALAZINE 25 mg oral tablet: 3 tab(s) orally every 8 hours  insulin glargine:   Januvia 25 mg oral tablet: 1 tab(s) orally once a day   labetalol 200 mg oral tablet: 1 tab(s) orally 2 times a day  polyethylene glycol 3350 oral powder for reconstitution: 17 gram(s) orally once a day  senna oral tablet: 2 tab(s) orally once a day (at bedtime)  traZODone 50 mg oral tablet: 1 tab(s) orally once a day (at bedtime)  traZODone 50 mg oral tablet: 1 tab(s) orally once a day (at bedtime)   acetaminophen 160 mg/5 mL oral suspension: 20.31 milliliter(s) orally every 6 hours, As needed, Temp greater or equal to 38C (100.4F), Mild Pain (1 - 3)  albuterol 90 mcg/inh inhalation aerosol: 2 puff(s) inhaled every 6 hours, As Needed -Shortness of Breath and/or Wheezing - for shortness of breath and/or wheezing   alogliptin 6.25 mg oral tablet: 1 tab(s) orally once a day   amLODIPine 10 mg oral tablet: 1 tab(s) orally once a day (in the evening)   apixaban 2.5 mg oral tablet: 1 tab(s) orally every 12 hours  apixaban 2.5 mg oral tablet: 1 tab(s) orally every 12 hours  atorvastatin 80 mg oral tablet: 1 tab(s) orally once a day (at bedtime)  bisacodyl 5 mg oral delayed release tablet: 1 tab(s) orally every 12 hours, As needed, Constipation  citalopram 20 mg oral tablet: 1 tab(s) orally once a day  furosemide 40 mg oral tablet: 1 tab(s) orally once a day  gabapentin 300 mg oral capsule: 2 cap(s) orally once a day (at bedtime)  hydrALAZINE 25 mg oral tablet: 3 tab(s) orally every 8 hours  insulin glargine:   labetalol 200 mg oral tablet: 1 tab(s) orally 2 times a day  polyethylene glycol 3350 oral powder for reconstitution: 17 gram(s) orally once a day  senna oral tablet: 2 tab(s) orally once a day (at bedtime)  traZODone 50 mg oral tablet: 1 tab(s) orally once a day (at bedtime)  traZODone 50 mg oral tablet: 1 tab(s) orally once a day (at bedtime)

## 2021-06-29 NOTE — DISCHARGE NOTE PROVIDER - NSDCFUADDINST_GEN_ALL_CORE_FT
Assistance with bathing, dressing,  ambulating outdoors and housekeeping as advised by rehabilitation team    Avoid alcohol and unprescribed medications as these can impair the brain's recovery process.

## 2021-06-29 NOTE — PROGRESS NOTE ADULT - ASSESSMENT
56 y/o M with PMHx of HTN, T2DM, HLD, depression, and prior CVA with no residual deficits, who presented to [facility] on 06/11/2021 with dysphagia and difficulty ambulating; found to have an acute right corona radiata infarct. Admitted for multidisciplinary rehab program.    #Acute corona radiata infarct and multiple b/l subacute infarcts  -Continue comprehensive rehab program - PT/OT/SLP per rehab team  -Pain management, bowel regimen per rehab   -Eliquis 2.5mg q12h and atorvastatin 80mg qhs    #JOSE, baseline Cr unknown - probably CKD3 - stable  -Some improvement noted after IVF. Encourage PO hydration  -Avoid nephrotoxins, monitor bmp   -Nephro consult  -Renal sono reviewed - no b/l hydro, renal mass, or calculi visualized     #HLD  #HTN - improving  -Amlodipine 10mg qd, labetalol 50mg q12h  -Lipitor 80mg qhs  -Hydralazine 25mg TID (6/26)    #T2DM, A1C 5.9 - controlled  -FS and ISS  -DC home on Metformin 500mg BID, Glipizide 5mg daily, and Sitagliptin 50mg daily  -Lantus 8un qhs while in rehab    #Neuropathy  -Gabapentin 600mg qhs    #Chronic Tobacco use  -Nicotine 21mg/24hr  -Smoking cessation counseling provided    #Mood/Depression  -Citalopram 20mg qd    #Moderate protein-calorie malnutrition   -Dietary/nutrition appreciated     #DVT ppx - Eliquis  -neg for b/l LE DVTs at [facility]

## 2021-06-29 NOTE — DISCHARGE NOTE PROVIDER - NSDCACTIVITY_GEN_ALL_CORE
Do not drive or operate machinery Do not drive or operate machinery/Walking - Indoors allowed/No heavy lifting/straining

## 2021-06-29 NOTE — DISCHARGE NOTE PROVIDER - CARE PROVIDERS DIRECT ADDRESSES
,DirectAddress_Unknown,DirectAddress_Unknown,[email or web address],DirectAddress_Unknown,DirectAddress_Unknown,DirectAddress_Unknown,DirectAddress_Unknown

## 2021-06-29 NOTE — PROGRESS NOTE ADULT - SUBJECTIVE AND OBJECTIVE BOX
HPI:  MARK ANTHONY QUINTANA is a 55M with PMHx of HTN, DM, HLD, ,depression, and prior CVA with no residual deficits, who presented to [facility] on 06/11/2021 with several days of dysphagia and difficulty ambulating. Patient had been experiencing symptoms since 6/8 after coming home from a dental appointment. He was brought to the hospital after a wellness check from his insurance company. In the ED, his Utox tested positive for cocaine, though patient denied use, and CT head scan showed findings c/w an acute right corona radiata infarct, along with multiple b/l subacute infarcts. Patient was managed medically, as he was not a candidate for either tPA or thrombectomy.    Patient was evaluated by PM&R and therapy for functional deficits and gait/ ADL impairments and recommended acute rehabilitation. Patient was medically optimized for discharge to [facility] on 06/21/2021. Patient was seen and examined at the bedside upon arrival.  (21 Jun 2021 15:04)      PAST MEDICAL & SURGICAL HISTORY:  Cerebrovascular accident (CVA)    Hypertension    Hyperlipidemia    Diabetes mellitus    Depression        Subjective:  slept better last night,  No pain.        VITALS  Vital Signs Last 24 Hrs  T(C): 36.8 (29 Jun 2021 08:38), Max: 36.8 (28 Jun 2021 21:06)  T(F): 98.2 (29 Jun 2021 08:38), Max: 98.3 (28 Jun 2021 21:06)  HR: 70 (29 Jun 2021 08:38) (67 - 73)  BP: 139/88 (29 Jun 2021 08:38) (139/88 - 167/97)  BP(mean): --  RR: 16 (29 Jun 2021 08:38) (15 - 16)  SpO2: 98% (29 Jun 2021 08:38) (97% - 98%)    REVIEW OF SYMPTOMS  [Neurological deficits    PHYSICAL EXAM  Constitutional - NAD, Comfortable  HEENT - NCAT  Chest - CTAB   Cardio - warm and well perfused, RRR, no murmur  Abdomen -  Soft, NTND, (+) BS  Extremities - No peripheral edema, No calf tenderness   Neurologic Exam:                    Cognitive -             Orientation: AA O x4     Speech - (+) mild dysarthria,  No aphasia     Cranial Nerves - Right pupil sluggish, left pupil reactive, Visual fields slightly impaired on right, flattening of right NLF, EOMI, Shoulder shrug intact, +dysarthria, +dysphagia     Motor -                      LEFT    UE - ShAB 5/5, EF 5/5, EE 4/5, WE 5/5,  WNL                    RIGHT UE - ShAB 5/5, EF 5/5, EE 4/5, WE 5/5,  WNL                    LEFT    LE - HF 5/5, KE 5/5, DF 5/5, PF 5/5                    RIGHT LE - HF 4/5, KE 4/5, DF 4/5, PF 4/5        Sensory - diminished sensation in dorsum and plantar aspect of right foot     Coordination - FTN intact & HTS slightly impaired on right  Psychiatric - mood stable, appropriate    RECENT LABS                        13.1   5.32  )-----------( 271      ( 28 Jun 2021 09:00 )             39.9     06-28    137  |  104  |  41<H>  ----------------------------<  129<H>  5.1   |  28  |  2.31<H>    Ca    8.8      28 Jun 2021 09:00    TPro  7.1  /  Alb  2.8<L>  /  TBili  0.4  /  DBili  0.1  /  AST  28  /  ALT  28  /  AlkPhos  156<H>  06-28            RADIOLOGY/OTHER RESULTS      MEDICATIONS  (STANDING):  amLODIPine   Tablet 10 milliGRAM(s) Oral daily  apixaban 2.5 milliGRAM(s) Oral every 12 hours  atorvastatin 80 milliGRAM(s) Oral at bedtime  citalopram 20 milliGRAM(s) Oral daily  dextrose 40% Gel 15 Gram(s) Oral once  dextrose 5%. 1000 milliLiter(s) (50 mL/Hr) IV Continuous <Continuous>  dextrose 5%. 1000 milliLiter(s) (100 mL/Hr) IV Continuous <Continuous>  dextrose 50% Injectable 25 Gram(s) IV Push once  dextrose 50% Injectable 12.5 Gram(s) IV Push once  dextrose 50% Injectable 25 Gram(s) IV Push once  gabapentin 600 milliGRAM(s) Oral at bedtime  glucagon  Injectable 1 milliGRAM(s) IntraMuscular once  hydrALAZINE 25 milliGRAM(s) Oral every 8 hours  insulin glargine Injectable (LANTUS) 8 Unit(s) SubCutaneous at bedtime  insulin lispro (ADMELOG) corrective regimen sliding scale   SubCutaneous three times a day before meals  insulin lispro (ADMELOG) corrective regimen sliding scale   SubCutaneous at bedtime  labetalol 50 milliGRAM(s) Oral two times a day  nicotine - 21 mG/24Hr(s) Patch 1 patch Transdermal daily  polyethylene glycol 3350 17 Gram(s) Oral daily  senna 2 Tablet(s) Oral at bedtime  traZODone 50 milliGRAM(s) Oral at bedtime    MEDICATIONS  (PRN):  acetaminophen    Suspension .. 650 milliGRAM(s) Oral every 6 hours PRN Temp greater or equal to 38C (100.4F), Mild Pain (1 - 3)  bisacodyl 5 milliGRAM(s) Oral every 12 hours PRN Constipation

## 2021-06-29 NOTE — DISCHARGE NOTE PROVIDER - DETAILS OF MALNUTRITION DIAGNOSIS/DIAGNOSES
This patient has been assessed with a concern for Malnutrition and was treated during this hospitalization for the following Nutrition diagnosis/diagnoses:     -  06/22/2021: Moderate protein-calorie malnutrition

## 2021-06-29 NOTE — DISCHARGE NOTE PROVIDER - NSDCCPCAREPLAN_GEN_ALL_CORE_FT
PRINCIPAL DISCHARGE DIAGNOSIS  Diagnosis: Status post stroke  Assessment and Plan of Treatment: You were admitted to [facility] for rehab after having a stroke. After being discharged, please continue to take all your medications, as prescribed, and have close follow up with your PCP, neurologist, and rehab doctor.      SECONDARY DISCHARGE DIAGNOSES  Diagnosis: JOSE (acute kidney injury)  Assessment and Plan of Treatment: During your course at [facility], your kidney metabolytes were elevated, which is a sign of kidney injury, likely as a result of dehydration. However, this improved with IV fluids and once you had passed your speech and swallow evaluation and was started on regular fluids.     PRINCIPAL DISCHARGE DIAGNOSIS  Diagnosis: Status post stroke  Assessment and Plan of Treatment: You were admitted to [facility] for rehab after having a stroke. After being discharged, please continue to take all your medications, as prescribed, and have close follow up with your PCP, neurologist, and rehab doctor.      SECONDARY DISCHARGE DIAGNOSES  Diagnosis: JOSE (acute kidney injury)  Assessment and Plan of Treatment: During your course at [facility], your kidney metabolytes were elevated, which is a sign of kidney injury, likely as a result of dehydration. However, this improved with IV fluids and once you had passed your speech and swallow evaluation and was started on regular fluids.  Nephrology was following you while hospitalized.  Your Cr remained stable.  Please follow up with Nephrology as outpatient.    Diagnosis: Diabetes mellitus  Assessment and Plan of Treatment: Please take alogliptan daily and monitor your Blood sugar twice daily.  Please follow up with your primary care physician or endocrinologist as an outpatient.    Diagnosis: Hypertension  Assessment and Plan of Treatment: Please take your blood pressure medication and follow up with your primary care physician.

## 2021-06-29 NOTE — DISCHARGE NOTE PROVIDER - CARE PROVIDER_API CALL
Nena Dumont  Phone: (   )    -  Fax: (   )    -  Scheduled Appointment: 07/22/2021 12:00 AM    Primary, Care  Phone: (   )    -  Fax: (   )    -  Follow Up Time:     Gricel Shane (DO)  Brain Injury Medicine; PhysicalRehab Medicine  [address] [place] [address]  Phone: [phone]  Fax: [phone]  Follow Up Time:    Gricel Shane (DO)  Brain Injury Medicine; PhysicalRehab Medicine  [address] [place] [address]  Phone: [phone]  Fax: [phone]  Follow Up Time:     Nena Dumont  Phone: (   )    -  Fax: (   )    -  Scheduled Appointment: 07/22/2021 11:00 PM    Primary, Care  Phone: (   )    -  Fax: (   )    -  Follow Up Time:     Nephrology [facility],   Phone: [phone]  Fax: (   )    -  Follow Up Time:     Shine Russo  [address] [place] [address]  Phone: [phone]  Fax: (   )    -  Follow Up Time:

## 2021-06-29 NOTE — PROGRESS NOTE ADULT - ASSESSMENT
MARK ANTHONY QUINTANA is a 55M with PMHx of HTN, DM, HLD, ,depression, and prior CVA with no residual deifcits, who presented to [facility] on 06/11/2021 with dysphagia and difficulty ambulating, & dysarthria found to have an acute right corona radiata infarct. Admitted for multidisciplinary rehab program.      #Comprehensive Multidisciplinary Rehab Program:  - Gait, ADL, Functional impairments  - PT/OT/ SLP 3 hours a day 5 days a week  -recreation therapy ordered    #Acute corona radiata infarct and multiple b/l subacute infarcts  - distribution of b/l subacute infarcts suggestive of cardioembolic origin  - not a candidate for tPA or thrombectomy  - c/w Eliquis 2.5mg q12h and atorvastatin 80mg qhs    #HLD  #HTN  --monitor BP --improved-- d/w hospitalist-- cont. current medications  - c/w amlodipine 10mg daily and labetalol  50mg q12h  - cont. hydralazine  25mg TID   - c/w atorvastatin    #CKD  - Cr improving-- 2.31 6/29  - encourage adequate PO hydration    #DM  - FS and ISS  -Lantus 8 units  -- Adjustment as per hospitalist  - DC home on Metformin 500mg BID, Glipizide 5mg daily, and Sitagliptin 50mg daily    #Neuropathy  - c/w gabapentin 600mg qhs    #Tobacco use  - Nicotine 21mg/24hr    #Mood/Depression  - c/w Citalopram 20mg daily    #Sleep:  - Maintain quiet hours and low stim environment;  --nursing order to turn off TV at 9PM to encourage proper sleep hygiene  - cont.  trazodone    #Pain:  - Tylenol PRN  - avoid sedating meds that may affect cognitive recovery    #GI/Bowel:  - Senna 2 tabs qhs and Miralax PRN  - PO Dulcolax PRN    #/Bladder:  - continent      #Skin/ Pressure Injury Prevention:  - assessment on admission   - Turn Q2hrs in bed while awake, OOB to Chair, PT/OT/SLP     #DVT prophylaxis:  - Eliquis  - SCDs  - neg for b/l LE DVTs at [facility]    #Precautions/ Restrictions  - Falls  - Lungs: Aspiration,   - COVID PCR: neg 6/11    #Dispo: IDR 06/24/2021  - OT: setup/supervision eating/UBD; min assist grooming/toilet transfer/LBD; mod assist bathing  - PT: min assist transfer; mod assist ambulation with RW and WC follow; mod assist 4 steps with 2 hand rails  - SLP: mod-severe cog deficits; (+) dysarthria and dysphagia; soft/nectar diet; MBS this week  - Goals: Oralia transfers, supervision ambulation 150', supervision 12 steps, oralia eating/grooming/UBD, supervision LBD/bathing/functional transfers  - TDD: [facility] 7/9    #LABs  - [facility] 6/28    --------------------------------------------  Outpatient Follow up:  Neurology - Dr. Nena Dumont on 07/22/2021 @ 11AM via telehealth  PCP  ----------------------------------------

## 2021-06-29 NOTE — PROGRESS NOTE ADULT - SUBJECTIVE AND OBJECTIVE BOX
Patient is a 55y old  Male who presents with a chief complaint of Acute right corona radiata CVA (27 Jun 2021 10:25)    Patient seen and examined at bedside. more awake, alert, communicative. no acute medical complaints.     ALLERGIES:  No Known Allergies    MEDICATIONS  (STANDING):  amLODIPine   Tablet 10 milliGRAM(s) Oral daily  apixaban 2.5 milliGRAM(s) Oral every 12 hours  atorvastatin 80 milliGRAM(s) Oral at bedtime  citalopram 20 milliGRAM(s) Oral daily  dextrose 40% Gel 15 Gram(s) Oral once  dextrose 5%. 1000 milliLiter(s) (50 mL/Hr) IV Continuous <Continuous>  dextrose 5%. 1000 milliLiter(s) (100 mL/Hr) IV Continuous <Continuous>  dextrose 50% Injectable 25 Gram(s) IV Push once  dextrose 50% Injectable 12.5 Gram(s) IV Push once  dextrose 50% Injectable 25 Gram(s) IV Push once  gabapentin 600 milliGRAM(s) Oral at bedtime  glucagon  Injectable 1 milliGRAM(s) IntraMuscular once  hydrALAZINE 25 milliGRAM(s) Oral every 8 hours  insulin glargine Injectable (LANTUS) 8 Unit(s) SubCutaneous at bedtime  insulin lispro (ADMELOG) corrective regimen sliding scale   SubCutaneous three times a day before meals  insulin lispro (ADMELOG) corrective regimen sliding scale   SubCutaneous at bedtime  labetalol 50 milliGRAM(s) Oral two times a day  nicotine - 21 mG/24Hr(s) Patch 1 patch Transdermal daily  polyethylene glycol 3350 17 Gram(s) Oral daily  senna 2 Tablet(s) Oral at bedtime  traZODone 50 milliGRAM(s) Oral at bedtime    MEDICATIONS  (PRN):  acetaminophen    Suspension .. 650 milliGRAM(s) Oral every 6 hours PRN Temp greater or equal to 38C (100.4F), Mild Pain (1 - 3)  bisacodyl 5 milliGRAM(s) Oral every 12 hours PRN Constipation    Vital Signs Last 24 Hrs  T(C): 36.8 (29 Jun 2021 08:38), Max: 36.8 (28 Jun 2021 21:06)  T(F): 98.2 (29 Jun 2021 08:38), Max: 98.3 (28 Jun 2021 21:06)  HR: 70 (29 Jun 2021 08:38) (67 - 73)  BP: 139/88 (29 Jun 2021 08:38) (139/88 - 167/97)  BP(mean): --  RR: 16 (29 Jun 2021 08:38) (15 - 16)  SpO2: 98% (29 Jun 2021 08:38) (97% - 98%)  I&O's Summary    27 Jun 2021 07:01  -  28 Jun 2021 07:00  --------------------------------------------------------  IN: 840 mL / OUT: 1370 mL / NET: -530 mL    PHYSICAL EXAM:  General: NAD, pleasant  ENT: MMM, no scleral icterus  Neck: Supple, No JVD  Lungs: Respirations unlabored. CTA b/l, diminished at bases  Cardio: RRR, S1/S2  Abdomen: Soft, Nontender, Nondistended; Bowel sounds present  Extremities: No calf tenderness, trace LE edema b/l     LABS:                        13.1   5.32  )-----------( 271      ( 28 Jun 2021 09:00 )             39.9       06-28    137  |  104  |  41  ----------------------------<  129  5.1   |  28  |  2.31    Ca    8.8      28 Jun 2021 09:00    TPro  7.1  /  Alb  2.8  /  TBili  0.4  /  DBili  0.1  /  AST  28  /  ALT  28  /  AlkPhos  156  06-28     eGFR if Non African American: 30 mL/min/1.73M2 (06-28-21 @ 09:00)  eGFR if : 35 mL/min/1.73M2 (06-28-21 @ 09:00)    POCT Blood Glucose.: 93 mg/dL (28 Jun 2021 07:24)  POCT Blood Glucose.: 158 mg/dL (27 Jun 2021 21:46)  POCT Blood Glucose.: 144 mg/dL (27 Jun 2021 16:42)    COVID-19 PCR: NotDetec (06-21-21 @ 23:30)    RADIOLOGY & ADDITIONAL TESTS: reviewed    Care Discussed with Consultants/Other Providers: yes - rehab

## 2021-06-29 NOTE — CHART NOTE - NSCHARTNOTEFT_GEN_A_CORE
Nutrition Follow Up Note  Hospital Course (Per Electronic Medical Record): 55M with PMHx of HTN, DM, HLD, ,depression, and prior CVA with no residual deifcits, who presented to [facility] on 06/11/2021 with dysphagia and difficulty ambulating, & dysarthria found to have an acute right corona radiata infarct. Admitted for multidisciplinary rehab program.  Source: Medical Record [X] Patient [X] Family [ ]         Diet: regular, consistent carbohydrate (evening snack), DASH/TLC  Pt tolerating diet with good PO intake. Pt reports that he feels hungry despite consuming 100% of meals. Pt receptive to additional protein at meals. Received a verbal consult from medical team to discuss pt's diet as pt has been observed with family eating/requesting desserts and salty foods. Pt provided with written and verbal nutrition education on heart healthy, consistent carbohydrate diet.     Enteral/Parenteral Nutrition: N/A    Current Weight: 217.1 lbs (6/29)  218.2 lbs (6/28)  217.5 lbs (6/27)  214.5 lbs (6/26)  215.3 lbs (6/25)    Pertinent Medications: MEDICATIONS  (STANDING):  amLODIPine   Tablet 10 milliGRAM(s) Oral daily  apixaban 2.5 milliGRAM(s) Oral every 12 hours  atorvastatin 80 milliGRAM(s) Oral at bedtime  citalopram 20 milliGRAM(s) Oral daily  dextrose 40% Gel 15 Gram(s) Oral once  dextrose 5%. 1000 milliLiter(s) (50 mL/Hr) IV Continuous <Continuous>  dextrose 5%. 1000 milliLiter(s) (100 mL/Hr) IV Continuous <Continuous>  dextrose 50% Injectable 25 Gram(s) IV Push once  dextrose 50% Injectable 12.5 Gram(s) IV Push once  dextrose 50% Injectable 25 Gram(s) IV Push once  gabapentin 600 milliGRAM(s) Oral at bedtime  glucagon  Injectable 1 milliGRAM(s) IntraMuscular once  hydrALAZINE 25 milliGRAM(s) Oral every 8 hours  insulin glargine Injectable (LANTUS) 8 Unit(s) SubCutaneous at bedtime  insulin lispro (ADMELOG) corrective regimen sliding scale   SubCutaneous three times a day before meals  insulin lispro (ADMELOG) corrective regimen sliding scale   SubCutaneous at bedtime  labetalol 50 milliGRAM(s) Oral two times a day  nicotine - 21 mG/24Hr(s) Patch 1 patch Transdermal daily  polyethylene glycol 3350 17 Gram(s) Oral daily  senna 2 Tablet(s) Oral at bedtime  traZODone 50 milliGRAM(s) Oral at bedtime    MEDICATIONS  (PRN):  acetaminophen    Suspension .. 650 milliGRAM(s) Oral every 6 hours PRN Temp greater or equal to 38C (100.4F), Mild Pain (1 - 3)  bisacodyl 5 milliGRAM(s) Oral every 12 hours PRN Constipation      Pertinent Labs:  06-28 Na137 mmol/L Glu 129 mg/dL<H> K+ 5.1 mmol/L Cr  2.31 mg/dL<H> BUN 41 mg/dL<H> 06-25 Phos 4.0 mg/dL 06-28 Alb 2.8 g/dL<L>        Skin: Skin intact per nursing flow sheets     Edema: No edema per nursing flow sheets     Last BM: on 6/25 Per nursing flowsheets     Estimated Needs:   [X] No Change since Previous Assessment  [ ] Recalculated:     Previous Nutrition Diagnosis:   Moderate malnutrition     Nutrition Diagnosis is [X] Ongoing  - improving, pt typically consuming 100% of meals  [ ] Resolved   [ ] Not Applicable      New Nutrition Diagnosis: [X] Not Applicable  [ ] Inadequate Protein Energy Intake   [ ] Inadequate Oral Intake   [ ] Excessive Energy Intake   [ ] Increased Nutrient Needs   [ ] Obesity   [ ] Altered GI Function   [ ] Unintended Weight Loss   [ ] Food & Nutrition Related Knowledge Deficit  [ ] Limited Adherence to nutrition related recommendations   [ ] Malnutrition      Interventions:   1. Recommend continuing with current diet  2. Encourage compliance with current diet  3. Pt provided with written and verbal nutrition ed on consistent carb, heart healthy diet    Monitoring & Evaluation:   [X] Weights   [X] PO Intake   [X] Follow Up (Per Protocol)  [X] Tolerance to Diet Prescription   [X] Other: Labs & POCT glucose    RD Remains Available.  Aleyda Lora RD

## 2021-06-29 NOTE — DISCHARGE NOTE PROVIDER - HOSPITAL COURSE
MARK ANTHONY QUINTANA is a 55M with PMHx of HTN, DM, HLD, ,depression, and prior CVA with no residual deficits, who presented to [facility] on 06/11/2021 with several days of dysphagia and difficulty ambulating. Patient had been experiencing symptoms since 6/8 after coming home from a dental appointment. He was brought to the hospital after a wellness check from his insurance company. In the ED, his Utox tested positive for cocaine, though patient denied use, and CT head scan showed findings c/w an acute right corona radiata infarct, along with multiple b/l subacute infarcts. Patient was managed medically, as he was not a candidate for either tPA or thrombectomy.    Patient was evaluated by PM&R and therapy for functional deficits and gait/ ADL impairments and recommended acute rehabilitation. Patient was medically optimized for discharge to [facility] on 06/21/2021. Patient was seen and examined at the bedside upon arrival. Patient was stable upon rehab admission to  Inpatient Rehabilitation Facility. Admitted with gait instabilty, ADL, and functional impairments.     Rehab Course significant for JOSE on CKD, which improved with IVF and once patient was cleared to start thin liquids. All other medical co-morbidities were stable. Patient tolerated course of inpatient PT/OT/SLP rehab with significant functional improvements and met rehab goals prior to discharge. Patient was medically cleared on ___  for discharged to ___     Current Functional Status:        Patient will follow up with the following:  PCP  PM&R  Neurology MARK ANTHONY QUINTANA is a 55M with PMHx of HTN, DM, HLD, ,depression, and prior CVA with no residual deficits, who presented to [facility] on 06/11/2021 with several days of dysphagia and difficulty ambulating. Patient had been experiencing symptoms since 6/8 after coming home from a dental appointment. He was brought to the hospital after a wellness check from his insurance company. In the ED, his Utox tested positive for cocaine, though patient denied use, and CT head scan showed findings c/w an acute right corona radiata infarct, along with multiple b/l subacute infarcts. Patient was managed medically, as he was not a candidate for either tPA or thrombectomy.    Patient was evaluated by PM&R and therapy for functional deficits and gait/ ADL impairments and recommended acute rehabilitation. Patient was medically optimized for discharge to [facility] on 06/21/2021. Patient was seen and examined at the bedside upon arrival. Patient was stable upon rehab admission to  Inpatient Rehabilitation Facility. Admitted with gait instabilty, ADL, and functional impairments.     Rehab Course significant for JOSE on CKD, which improved with IVF and once patient was cleared to start thin liquids. All other medical co-morbidities were stable. Patient tolerated course of inpatient PT/OT/SLP rehab with significant functional improvements and met rehab goals prior to discharge. Patient was medically cleared on 7/8/2021 for discharged to subacute rehab.    Current Functional Status:    - OT: setup/supervision eating/grooming/UBD; min assist toilet transfer/LBD/shower; CG bathing  - PT: CG/min assist transfer; min assist ambulation 70ft with RW; min assist 12 steps with 2 hand rails  - SLP: mod-severe cog deficits--impaired PS, needs assist with money management and meds, impaired memory, +hs insight into deficits; (+) dysarthria; reg/thin      Patient will follow up with the following:  PCP  PM&R  Neurology MARK ANTHONY QUINTANA is a 55M with PMHx of HTN, DM, HLD, ,depression, and prior CVA with no residual deficits, who presented to [facility] on 06/11/2021 with several days of dysphagia and difficulty ambulating. Patient had been experiencing symptoms since 6/8 after coming home from a dental appointment. He was brought to the hospital after a wellness check from his insurance company. In the ED, his Utox tested positive for cocaine, though patient denied use, and CT head scan showed findings c/w an acute right corona radiata infarct, along with multiple b/l subacute infarcts. Patient was managed medically, as he was not a candidate for either tPA or thrombectomy.    Patient was evaluated by PM&R and therapy for functional deficits and gait/ ADL impairments and recommended acute rehabilitation. Patient was medically optimized for discharge to [facility] on 06/21/2021. Patient was seen and examined at the bedside upon arrival. Patient was stable upon rehab admission to  Inpatient Rehabilitation Facility. Admitted with gait instabilty, ADL, and functional impairments.     Rehab Course significant for JOSE on CKD, which improved with IVF and once patient was cleared to start thin liquids.   Patients blood pressure was uncontrolled at times so his anti-hypertensives were adjusted and he has good control on discharge.  Nephrology saw patient  for JOSE.  Cr remained stable.  Patient needs to follow up with Nephrology as outpatient.    All other medical co-morbidities were stable. Patient tolerated course of inpatient PT/OT/SLP rehab with significant functional improvements and met rehab goals prior to discharge. Patient was medically cleared on 7/8/2021 for discharged to subacute rehab.    discharge Functional Status:  -OT: supervision eating/grooming/UBD; CG toilet transfer/shower transfer; CG bathing; min A LBD, toileting, shower  - PT: Mod I transfer, and ambulation 150ft with cane--decreased right foot clearance--was fitted for AFO; Supervision 12 steps with 2 hand rails  - SLP: mod-severe  PS, Poor safety and insight, Moderate Memory deficits; needs assist with money management and meds,  visual deficits; (+) dysarthria; reg/thins        Patient will follow up with the following:  PCP  PM&R  Neurology

## 2021-06-29 NOTE — DISCHARGE NOTE PROVIDER - NSDCQMANTICOAGCONTRA_GEN_A_CORE
Patient does not have atrial fibrillation/flutter Patient does not have atrial fibrillation/flutter/Large stroke or hemorrhagic conversion

## 2021-06-30 LAB
GLUCOSE BLDC GLUCOMTR-MCNC: 163 MG/DL — HIGH (ref 70–99)
GLUCOSE BLDC GLUCOMTR-MCNC: 194 MG/DL — HIGH (ref 70–99)
GLUCOSE BLDC GLUCOMTR-MCNC: 198 MG/DL — HIGH (ref 70–99)
GLUCOSE BLDC GLUCOMTR-MCNC: 93 MG/DL — SIGNIFICANT CHANGE UP (ref 70–99)

## 2021-06-30 PROCEDURE — 99232 SBSQ HOSP IP/OBS MODERATE 35: CPT

## 2021-06-30 RX ORDER — HYDRALAZINE HCL 50 MG
25 TABLET ORAL ONCE
Refills: 0 | Status: COMPLETED | OUTPATIENT
Start: 2021-06-30 | End: 2021-06-30

## 2021-06-30 RX ADMIN — ATORVASTATIN CALCIUM 80 MILLIGRAM(S): 80 TABLET, FILM COATED ORAL at 21:10

## 2021-06-30 RX ADMIN — Medication 50 MILLIGRAM(S): at 21:09

## 2021-06-30 RX ADMIN — SENNA PLUS 2 TABLET(S): 8.6 TABLET ORAL at 21:09

## 2021-06-30 RX ADMIN — CITALOPRAM 20 MILLIGRAM(S): 10 TABLET, FILM COATED ORAL at 11:56

## 2021-06-30 RX ADMIN — Medication 1 PATCH: at 11:30

## 2021-06-30 RX ADMIN — Medication 1 PATCH: at 09:07

## 2021-06-30 RX ADMIN — Medication 1: at 11:56

## 2021-06-30 RX ADMIN — APIXABAN 2.5 MILLIGRAM(S): 2.5 TABLET, FILM COATED ORAL at 05:08

## 2021-06-30 RX ADMIN — POLYETHYLENE GLYCOL 3350 17 GRAM(S): 17 POWDER, FOR SOLUTION ORAL at 11:55

## 2021-06-30 RX ADMIN — Medication 25 MILLIGRAM(S): at 05:09

## 2021-06-30 RX ADMIN — AMLODIPINE BESYLATE 10 MILLIGRAM(S): 2.5 TABLET ORAL at 05:09

## 2021-06-30 RX ADMIN — Medication 25 MILLIGRAM(S): at 22:39

## 2021-06-30 RX ADMIN — Medication 50 MILLIGRAM(S): at 05:09

## 2021-06-30 RX ADMIN — Medication 1: at 16:41

## 2021-06-30 RX ADMIN — Medication 1 PATCH: at 19:40

## 2021-06-30 RX ADMIN — Medication 25 MILLIGRAM(S): at 21:09

## 2021-06-30 RX ADMIN — Medication 50 MILLIGRAM(S): at 18:11

## 2021-06-30 RX ADMIN — Medication 650 MILLIGRAM(S): at 23:53

## 2021-06-30 RX ADMIN — INSULIN GLARGINE 8 UNIT(S): 100 INJECTION, SOLUTION SUBCUTANEOUS at 21:10

## 2021-06-30 RX ADMIN — GABAPENTIN 600 MILLIGRAM(S): 400 CAPSULE ORAL at 21:09

## 2021-06-30 RX ADMIN — APIXABAN 2.5 MILLIGRAM(S): 2.5 TABLET, FILM COATED ORAL at 18:11

## 2021-06-30 RX ADMIN — Medication 25 MILLIGRAM(S): at 15:08

## 2021-06-30 RX ADMIN — Medication 1 PATCH: at 11:55

## 2021-06-30 NOTE — PROGRESS NOTE ADULT - SUBJECTIVE AND OBJECTIVE BOX
Patient is a 55y old  Male who presents with a chief complaint of Acute right corona radiata CVA (27 Jun 2021 10:25)    Patient seen and examined at bedside. more awake, alert, communicative. no acute medical complaints.     ALLERGIES:  No Known Allergies    MEDICATIONS  (STANDING):  amLODIPine   Tablet 10 milliGRAM(s) Oral daily  apixaban 2.5 milliGRAM(s) Oral every 12 hours  atorvastatin 80 milliGRAM(s) Oral at bedtime  citalopram 20 milliGRAM(s) Oral daily  dextrose 40% Gel 15 Gram(s) Oral once  dextrose 5%. 1000 milliLiter(s) (50 mL/Hr) IV Continuous <Continuous>  dextrose 5%. 1000 milliLiter(s) (100 mL/Hr) IV Continuous <Continuous>  dextrose 50% Injectable 25 Gram(s) IV Push once  dextrose 50% Injectable 12.5 Gram(s) IV Push once  dextrose 50% Injectable 25 Gram(s) IV Push once  gabapentin 600 milliGRAM(s) Oral at bedtime  glucagon  Injectable 1 milliGRAM(s) IntraMuscular once  hydrALAZINE 25 milliGRAM(s) Oral every 8 hours  insulin glargine Injectable (LANTUS) 8 Unit(s) SubCutaneous at bedtime  insulin lispro (ADMELOG) corrective regimen sliding scale   SubCutaneous three times a day before meals  insulin lispro (ADMELOG) corrective regimen sliding scale   SubCutaneous at bedtime  labetalol 50 milliGRAM(s) Oral two times a day  nicotine - 21 mG/24Hr(s) Patch 1 patch Transdermal daily  polyethylene glycol 3350 17 Gram(s) Oral daily  senna 2 Tablet(s) Oral at bedtime  traZODone 50 milliGRAM(s) Oral at bedtime    MEDICATIONS  (PRN):  acetaminophen    Suspension .. 650 milliGRAM(s) Oral every 6 hours PRN Temp greater or equal to 38C (100.4F), Mild Pain (1 - 3)  bisacodyl 5 milliGRAM(s) Oral every 12 hours PRN Constipation    Vital Signs Last 24 Hrs  T(C): 36.8 (29 Jun 2021 21:00), Max: 36.8 (29 Jun 2021 21:00)  T(F): 98.2 (29 Jun 2021 21:00), Max: 98.2 (29 Jun 2021 21:00)  HR: 84 (30 Jun 2021 05:02) (66 - 84)  BP: 148/95 (30 Jun 2021 05:02) (148/95 - 162/89)  BP(mean): --  RR: 16 (29 Jun 2021 21:00) (16 - 16)  SpO2: 99% (29 Jun 2021 21:00) (98% - 99%)  I&O's Summary    27 Jun 2021 07:01  -  28 Jun 2021 07:00  --------------------------------------------------------  IN: 840 mL / OUT: 1370 mL / NET: -530 mL    PHYSICAL EXAM:  General: NAD, pleasant  ENT: MMM, no scleral icterus  Neck: Supple, No JVD  Lungs: Respirations unlabored. CTA b/l, diminished at bases  Cardio: RRR, S1/S2  Abdomen: Soft, Nontender, Nondistended; Bowel sounds present  Extremities: No calf tenderness, trace LE edema b/l     LABS:                        13.1   5.32  )-----------( 271      ( 28 Jun 2021 09:00 )             39.9       06-28    137  |  104  |  41  ----------------------------<  129  5.1   |  28  |  2.31    Ca    8.8      28 Jun 2021 09:00    TPro  7.1  /  Alb  2.8  /  TBili  0.4  /  DBili  0.1  /  AST  28  /  ALT  28  /  AlkPhos  156  06-28     eGFR if Non African American: 30 mL/min/1.73M2 (06-28-21 @ 09:00)  eGFR if : 35 mL/min/1.73M2 (06-28-21 @ 09:00)    POCT Blood Glucose.: 93 mg/dL (28 Jun 2021 07:24)  POCT Blood Glucose.: 158 mg/dL (27 Jun 2021 21:46)  POCT Blood Glucose.: 144 mg/dL (27 Jun 2021 16:42)    COVID-19 PCR: NotDetec (06-21-21 @ 23:30)    RADIOLOGY & ADDITIONAL TESTS: reviewed    Care Discussed with Consultants/Other Providers: yes - rehab

## 2021-06-30 NOTE — PROGRESS NOTE ADULT - ASSESSMENT
MARK ANTHONY QUINTANA is a 55M with PMHx of HTN, DM, HLD, ,depression, and prior CVA with no residual deifcits, who presented to [facility] on [facility]21 with dysphagia and difficulty ambulating, & dysarthria found to have an acute right corona radiata infarct. Admitted for multidisciplinary rehab program.      #Comprehensive Multidisciplinary Rehab Program:  - Gait, ADL, Functional impairments  - PT/OT/ SLP 3 hours a day 5 days a week  -recreation therapy ordered    #Acute corona radiata infarct and multiple b/l subacute infarcts  - distribution of b/l subacute infarcts suggestive of cardioembolic origin  - not a candidate for tPA or thrombectomy  - c/w Eliquis 2.5mg q12h and atorvastatin 80mg qhs    #HLD  #HTN  --monitor BP -- d/w hospitalist-- cont. current medications  - c/w amlodipine 10mg daily and labetalol  50mg q12h  - cont. hydralazine  25mg TID   - c/w atorvastatin    #CKD  - f/u Cr tomorrow  - encourage adequate PO hydration    #DM  - FS and ISS  -Lantus 8 units  -- Adjustment as per hospitalist  - DC home on Metformin 500mg BID, Glipizide 5mg daily, and Sitagliptin 50mg daily    #Neuropathy  - c/w gabapentin 600mg qhs    #Tobacco use  - Nicotine 21mg/24hr    #Mood/Depression  - c/w Citalopram 20mg daily    #Sleep:  - Maintain quiet hours and low stim environment;  --nursing order to turn off TV at 9PM to encourage proper sleep hygiene  - cont.  trazodone    #Pain:  - Tylenol PRN  - avoid sedating meds that may affect cognitive recovery    #GI/Bowel:  - Senna 2 tabs qhs and Miralax PRN  - PO Dulcolax PRN    #/Bladder:  - continent      #Skin/ Pressure Injury Prevention:  - assessment on admission   - Turn Q2hrs in bed while awake, OOB to Chair, PT/OT/SLP     #DVT prophylaxis:  - Eliquis  - SCDs  - neg for b/l LE DVTs at Health system    #Precautions/ Restrictions  - Falls  - Lungs: Aspiration,   - COVID PCR: neg 6/11    #Dispo: IDR 06/24/2021  - OT: setup/supervision eating/UBD; min assist grooming/toilet transfer/LBD; mod assist bathing  - PT: min assist transfer; mod assist ambulation with RW and WC follow; mod assist 4 steps with 2 hand rails  - SLP: mod-severe cog deficits; (+) dysarthria and dysphagia; soft/nectar diet; MBS this week  - Goals: Oralia transfers, supervision ambulation 150', supervision 12 steps, oralia eating/grooming/UBD, supervision LBD/bathing/functional transfers  - TDD: [facility] 7/9    #LABs  - [facility] 6/28    --------------------------------------------  Outpatient Follow up:  Neurology - Dr. Nena Dumont on 07/22/2021 @ 11AM via telehealth  PCP  ----------------------------------------

## 2021-06-30 NOTE — PROGRESS NOTE ADULT - SUBJECTIVE AND OBJECTIVE BOX
HPI:  MARK ANTHONY QUINTANA is a 55M with PMHx of HTN, DM, HLD, ,depression, and prior CVA with no residual deficits, who presented to [facility] on 06/11/2021 with several days of dysphagia and difficulty ambulating. Patient had been experiencing symptoms since 6/8 after coming home from a dental appointment. He was brought to the hospital after a wellness check from his insurance company. In the ED, his Utox tested positive for cocaine, though patient denied use, and CT head scan showed findings c/w an acute right corona radiata infarct, along with multiple b/l subacute infarcts. Patient was managed medically, as he was not a candidate for either tPA or thrombectomy.    Patient was evaluated by PM&R and therapy for functional deficits and gait/ ADL impairments and recommended acute rehabilitation. Patient was medically optimized for discharge to [facility] on 06/21/2021. Patient was seen and examined at the bedside upon arrival.  (21 Jun 2021 15:04)      PAST MEDICAL & SURGICAL HISTORY:  Cerebrovascular accident (CVA)    Hypertension    Hyperlipidemia    Diabetes mellitus    Depression        Subjective: no new complaints, slept ok.  making progress in therapy      VITALS  Vital Signs Last 24 Hrs  T(C): 36.4 (30 Jun 2021 15:06), Max: 36.8 (29 Jun 2021 21:00)  T(F): 97.5 (30 Jun 2021 15:06), Max: 98.2 (29 Jun 2021 21:00)  HR: 69 (30 Jun 2021 15:06) (66 - 84)  BP: 155/93 (30 Jun 2021 15:06) (148/95 - 162/89)  BP(mean): --  RR: 16 (30 Jun 2021 15:06) (16 - 16)  SpO2: 98% (30 Jun 2021 15:06) (98% - 99%)    REVIEW OF SYMPTOMS  Neurological deficits    PHYSICAL EXAM  Constitutional - NAD, Comfortable  HEENT - NCAT  Chest - CTAB   Cardio - warm and well perfused, RRR, no murmur  Abdomen -  Soft, NTND, (+) BS  Extremities - No peripheral edema, No calf tenderness   Neurologic Exam:                    Cognitive -             Orientation: AA O x4     Speech - (+) mild dysarthria,  No aphasia     Cranial Nerves - Right pupil sluggish, left pupil reactive, Visual fields slightly impaired on right, flattening of right NLF, EOMI, Shoulder shrug intact, +dysarthria, +dysphagia     Motor -                      LEFT    UE - ShAB 5/5, EF 5/5, EE 4/5, WE 5/5,  WNL                    RIGHT UE - ShAB 5/5, EF 5/5, EE 4/5, WE 5/5,  WNL                    LEFT    LE - HF 5/5, KE 5/5, DF 5/5, PF 5/5                    RIGHT LE - HF 4/5, KE 4/5, DF 4/5, PF 4/5        Sensory - diminished sensation in dorsum and plantar aspect of right foot     Coordination - FTN intact & HTS slightly impaired on right  Psychiatric - mood stable, appropriate    RECENT LABS                  RADIOLOGY/OTHER RESULTS      MEDICATIONS  (STANDING):  amLODIPine   Tablet 10 milliGRAM(s) Oral daily  apixaban 2.5 milliGRAM(s) Oral every 12 hours  atorvastatin 80 milliGRAM(s) Oral at bedtime  citalopram 20 milliGRAM(s) Oral daily  dextrose 40% Gel 15 Gram(s) Oral once  dextrose 5%. 1000 milliLiter(s) (50 mL/Hr) IV Continuous <Continuous>  dextrose 5%. 1000 milliLiter(s) (100 mL/Hr) IV Continuous <Continuous>  dextrose 50% Injectable 25 Gram(s) IV Push once  dextrose 50% Injectable 12.5 Gram(s) IV Push once  dextrose 50% Injectable 25 Gram(s) IV Push once  gabapentin 600 milliGRAM(s) Oral at bedtime  glucagon  Injectable 1 milliGRAM(s) IntraMuscular once  hydrALAZINE 25 milliGRAM(s) Oral every 8 hours  insulin glargine Injectable (LANTUS) 8 Unit(s) SubCutaneous at bedtime  insulin lispro (ADMELOG) corrective regimen sliding scale   SubCutaneous three times a day before meals  insulin lispro (ADMELOG) corrective regimen sliding scale   SubCutaneous at bedtime  labetalol 50 milliGRAM(s) Oral two times a day  nicotine - 21 mG/24Hr(s) Patch 1 patch Transdermal daily  polyethylene glycol 3350 17 Gram(s) Oral daily  senna 2 Tablet(s) Oral at bedtime  traZODone 50 milliGRAM(s) Oral at bedtime    MEDICATIONS  (PRN):  acetaminophen    Suspension .. 650 milliGRAM(s) Oral every 6 hours PRN Temp greater or equal to 38C (100.4F), Mild Pain (1 - 3)  bisacodyl 5 milliGRAM(s) Oral every 12 hours PRN Constipation

## 2021-06-30 NOTE — PROGRESS NOTE ADULT - ASSESSMENT
54 y/o M with PMHx of HTN, T2DM, HLD, depression, and prior CVA with no residual deficits, who presented to [facility] on 06/11/2021 with dysphagia and difficulty ambulating; found to have an acute right corona radiata infarct. Admitted for multidisciplinary rehab program.    #Acute corona radiata infarct and multiple b/l subacute infarcts  -Continue comprehensive rehab program - PT/OT/SLP per rehab team  -Pain management, bowel regimen per rehab   -Eliquis 2.5mg q12h and atorvastatin 80mg qhs    #JOSE, baseline Cr unknown - probably CKD3 - stable  -Some improvement noted after IVF. Encourage PO hydration  -Avoid nephrotoxins, monitor bmp   -Nephro consult  -Renal sono reviewed - no b/l hydro, renal mass, or calculi visualized     #HLD  #HTN - improving  -Amlodipine 10mg qd, labetalol 50mg q12h  -Lipitor 80mg qhs  -Hydralazine 25mg TID (6/26)    #T2DM, A1C 5.9 - controlled  -FS and ISS  -DC home on Metformin 500mg BID, Glipizide 5mg daily, and Sitagliptin 50mg daily  -Lantus 8un qhs while in rehab    #Neuropathy  -Gabapentin 600mg qhs    #Chronic Tobacco use  -Nicotine 21mg/24hr  -Smoking cessation counseling provided    #Mood/Depression  -Citalopram 20mg qd    #Moderate protein-calorie malnutrition   -Dietary/nutrition appreciated     #DVT ppx - Eliquis  -neg for b/l LE DVTs at [facility]

## 2021-07-01 LAB
ALBUMIN SERPL ELPH-MCNC: 2.6 G/DL — LOW (ref 3.3–5)
ALP SERPL-CCNC: 142 U/L — HIGH (ref 40–120)
ALT FLD-CCNC: 27 U/L — SIGNIFICANT CHANGE UP (ref 10–45)
ANION GAP SERPL CALC-SCNC: 6 MMOL/L — SIGNIFICANT CHANGE UP (ref 5–17)
AST SERPL-CCNC: 31 U/L — SIGNIFICANT CHANGE UP (ref 10–40)
BILIRUB DIRECT SERPL-MCNC: 0.1 MG/DL — SIGNIFICANT CHANGE UP (ref 0–0.2)
BILIRUB INDIRECT FLD-MCNC: 0.3 MG/DL — SIGNIFICANT CHANGE UP (ref 0.2–1)
BILIRUB SERPL-MCNC: 0.4 MG/DL — SIGNIFICANT CHANGE UP (ref 0.2–1.2)
BUN SERPL-MCNC: 48 MG/DL — HIGH (ref 7–23)
CALCIUM SERPL-MCNC: 8.6 MG/DL — SIGNIFICANT CHANGE UP (ref 8.4–10.5)
CHLORIDE SERPL-SCNC: 106 MMOL/L — SIGNIFICANT CHANGE UP (ref 96–108)
CO2 SERPL-SCNC: 26 MMOL/L — SIGNIFICANT CHANGE UP (ref 22–31)
CREAT SERPL-MCNC: 2.59 MG/DL — HIGH (ref 0.5–1.3)
GLUCOSE BLDC GLUCOMTR-MCNC: 168 MG/DL — HIGH (ref 70–99)
GLUCOSE BLDC GLUCOMTR-MCNC: 176 MG/DL — HIGH (ref 70–99)
GLUCOSE BLDC GLUCOMTR-MCNC: 246 MG/DL — HIGH (ref 70–99)
GLUCOSE BLDC GLUCOMTR-MCNC: 273 MG/DL — HIGH (ref 70–99)
GLUCOSE SERPL-MCNC: 207 MG/DL — HIGH (ref 70–99)
HCT VFR BLD CALC: 36.6 % — LOW (ref 39–50)
HGB BLD-MCNC: 11.8 G/DL — LOW (ref 13–17)
MCHC RBC-ENTMCNC: 30.2 PG — SIGNIFICANT CHANGE UP (ref 27–34)
MCHC RBC-ENTMCNC: 32.2 GM/DL — SIGNIFICANT CHANGE UP (ref 32–36)
MCV RBC AUTO: 93.6 FL — SIGNIFICANT CHANGE UP (ref 80–100)
NRBC # BLD: 0 /100 WBCS — SIGNIFICANT CHANGE UP (ref 0–0)
PLATELET # BLD AUTO: 268 K/UL — SIGNIFICANT CHANGE UP (ref 150–400)
POTASSIUM SERPL-MCNC: 5 MMOL/L — SIGNIFICANT CHANGE UP (ref 3.5–5.3)
POTASSIUM SERPL-SCNC: 5 MMOL/L — SIGNIFICANT CHANGE UP (ref 3.5–5.3)
PROT SERPL-MCNC: 6.5 G/DL — SIGNIFICANT CHANGE UP (ref 6–8.3)
RBC # BLD: 3.91 M/UL — LOW (ref 4.2–5.8)
RBC # FLD: 11 % — SIGNIFICANT CHANGE UP (ref 10.3–14.5)
SODIUM SERPL-SCNC: 138 MMOL/L — SIGNIFICANT CHANGE UP (ref 135–145)
WBC # BLD: 6 K/UL — SIGNIFICANT CHANGE UP (ref 3.8–10.5)
WBC # FLD AUTO: 6 K/UL — SIGNIFICANT CHANGE UP (ref 3.8–10.5)

## 2021-07-01 PROCEDURE — 99232 SBSQ HOSP IP/OBS MODERATE 35: CPT

## 2021-07-01 RX ORDER — HYDRALAZINE HCL 50 MG
25 TABLET ORAL ONCE
Refills: 0 | Status: COMPLETED | OUTPATIENT
Start: 2021-07-01 | End: 2021-07-01

## 2021-07-01 RX ORDER — AMLODIPINE BESYLATE 2.5 MG/1
10 TABLET ORAL
Refills: 0 | Status: DISCONTINUED | OUTPATIENT
Start: 2021-07-02 | End: 2021-07-02

## 2021-07-01 RX ORDER — LABETALOL HCL 100 MG
50 TABLET ORAL ONCE
Refills: 0 | Status: COMPLETED | OUTPATIENT
Start: 2021-07-01 | End: 2021-07-01

## 2021-07-01 RX ORDER — AMLODIPINE BESYLATE 2.5 MG/1
10 TABLET ORAL
Refills: 0 | Status: DISCONTINUED | OUTPATIENT
Start: 2021-07-01 | End: 2021-07-01

## 2021-07-01 RX ORDER — HYDRALAZINE HCL 50 MG
50 TABLET ORAL EVERY 8 HOURS
Refills: 0 | Status: DISCONTINUED | OUTPATIENT
Start: 2021-07-01 | End: 2021-07-02

## 2021-07-01 RX ORDER — SODIUM CHLORIDE 9 MG/ML
1000 INJECTION, SOLUTION INTRAVENOUS
Refills: 0 | Status: DISCONTINUED | OUTPATIENT
Start: 2021-07-01 | End: 2021-07-01

## 2021-07-01 RX ADMIN — Medication 50 MILLIGRAM(S): at 05:51

## 2021-07-01 RX ADMIN — Medication 3: at 16:24

## 2021-07-01 RX ADMIN — GABAPENTIN 600 MILLIGRAM(S): 400 CAPSULE ORAL at 21:16

## 2021-07-01 RX ADMIN — Medication 650 MILLIGRAM(S): at 00:53

## 2021-07-01 RX ADMIN — CITALOPRAM 20 MILLIGRAM(S): 10 TABLET, FILM COATED ORAL at 11:57

## 2021-07-01 RX ADMIN — Medication 2: at 11:52

## 2021-07-01 RX ADMIN — Medication 1 PATCH: at 19:06

## 2021-07-01 RX ADMIN — Medication 25 MILLIGRAM(S): at 05:50

## 2021-07-01 RX ADMIN — AMLODIPINE BESYLATE 10 MILLIGRAM(S): 2.5 TABLET ORAL at 05:51

## 2021-07-01 RX ADMIN — APIXABAN 2.5 MILLIGRAM(S): 2.5 TABLET, FILM COATED ORAL at 05:51

## 2021-07-01 RX ADMIN — POLYETHYLENE GLYCOL 3350 17 GRAM(S): 17 POWDER, FOR SOLUTION ORAL at 11:56

## 2021-07-01 RX ADMIN — Medication 1 PATCH: at 07:31

## 2021-07-01 RX ADMIN — ATORVASTATIN CALCIUM 80 MILLIGRAM(S): 80 TABLET, FILM COATED ORAL at 21:16

## 2021-07-01 RX ADMIN — APIXABAN 2.5 MILLIGRAM(S): 2.5 TABLET, FILM COATED ORAL at 17:21

## 2021-07-01 RX ADMIN — Medication 25 MILLIGRAM(S): at 19:43

## 2021-07-01 RX ADMIN — Medication 650 MILLIGRAM(S): at 22:41

## 2021-07-01 RX ADMIN — Medication 50 MILLIGRAM(S): at 22:41

## 2021-07-01 RX ADMIN — Medication 25 MILLIGRAM(S): at 20:03

## 2021-07-01 RX ADMIN — Medication 1 PATCH: at 11:59

## 2021-07-01 RX ADMIN — Medication 25 MILLIGRAM(S): at 21:09

## 2021-07-01 RX ADMIN — Medication 1: at 07:35

## 2021-07-01 RX ADMIN — Medication 50 MILLIGRAM(S): at 21:16

## 2021-07-01 RX ADMIN — SENNA PLUS 2 TABLET(S): 8.6 TABLET ORAL at 21:16

## 2021-07-01 RX ADMIN — Medication 1 PATCH: at 11:57

## 2021-07-01 RX ADMIN — Medication 50 MILLIGRAM(S): at 17:20

## 2021-07-01 RX ADMIN — Medication 650 MILLIGRAM(S): at 23:41

## 2021-07-01 RX ADMIN — Medication 25 MILLIGRAM(S): at 14:11

## 2021-07-01 RX ADMIN — INSULIN GLARGINE 8 UNIT(S): 100 INJECTION, SOLUTION SUBCUTANEOUS at 21:17

## 2021-07-01 NOTE — PROGRESS NOTE ADULT - ASSESSMENT
56 y/o M with PMHx of HTN, T2DM, HLD, depression, and prior CVA with no residual deficits, who presented to [facility] on 06/11/2021 with dysphagia and difficulty ambulating; found to have an acute right corona radiata infarct. Admitted for multidisciplinary rehab program.    #Acute corona radiata infarct and multiple b/l subacute infarcts  -Continue comprehensive rehab program - PT/OT/SLP per rehab team  -Eliquis 2.5mg q12h and atorvastatin 80mg qhs    #JOSE, baseline Cr unknown - probably CKD3 - stable  -Some improvement noted after IVF. Encourage PO hydration  -Avoid nephrotoxins, monitor bmp   -Nephro consult  -Renal sono reviewed - no b/l hydro, renal mass, or calculi visualized     #HLD  #HTN  -Readjust timing of medications. Change Amlodipine to 6pm every day  -Continue Hydralazine + Labetalol. If continues to be hypertensive, can increase Hydralazine to 50mg TID   -Lipitor 80mg qhs    #T2DM, A1C 5.9 - controlled  -FS and ISS  -DC home on Metformin 500mg BID, Glipizide 5mg daily, and Sitagliptin 50mg daily  -Lantus 8un qhs while in rehab    #Neuropathy  -Gabapentin 600mg qhs    #Chronic Tobacco use  -Nicotine 21mg/24hr  -Smoking cessation counseling provided    #Mood/Depression  -Citalopram 20mg qd    #Moderate protein-calorie malnutrition   -Dietary/nutrition appreciated     #DVT ppx - Eliquis  -neg for b/l LE DVTs at [facility]

## 2021-07-01 NOTE — PROGRESS NOTE ADULT - SUBJECTIVE AND OBJECTIVE BOX
HPI:  MARK ANTHONY QUINTANA is a 55M with PMHx of HTN, DM, HLD, ,depression, and prior CVA with no residual deficits, who presented to [facility] on 06/11/2021 with several days of dysphagia and difficulty ambulating. Patient had been experiencing symptoms since 6/8 after coming home from a dental appointment. He was brought to the hospital after a wellness check from his insurance company. In the ED, his Utox tested positive for cocaine, though patient denied use, and CT head scan showed findings c/w an acute right corona radiata infarct, along with multiple b/l subacute infarcts. Patient was managed medically, as he was not a candidate for either tPA or thrombectomy.    Patient was evaluated by PM&R and therapy for functional deficits and gait/ ADL impairments and recommended acute rehabilitation. Patient was medically optimized for discharge to [facility] on 06/21/2021. Patient was seen and examined at the bedside upon arrival.  (21 Jun 2021 15:04)      PAST MEDICAL & SURGICAL HISTORY:  Cerebrovascular accident (CVA)    Hypertension    Hyperlipidemia    Diabetes mellitus    Depression        Subjective:  No new complaints. slept ok.  Pt. with elevated BP--SBP 160s in therapy this AM      VITALS  Vital Signs Last 24 Hrs  T(C): 36.7 (01 Jul 2021 07:41), Max: 36.8 (30 Jun 2021 23:40)  T(F): 98.1 (01 Jul 2021 07:41), Max: 98.2 (30 Jun 2021 23:40)  HR: 73 (01 Jul 2021 07:41) (69 - 96)  BP: 153/86 (01 Jul 2021 07:41) (134/84 - 186/112)  BP(mean): --  RR: 14 (01 Jul 2021 07:41) (14 - 16)  SpO2: 98% (01 Jul 2021 07:41) (97% - 99%)    REVIEW OF SYMPTOMS  Neurological deficits    PHYSICAL EXAM  Constitutional - NAD, Comfortable  HEENT - NCAT  Chest - CTAB   Cardio - warm and well perfused, RRR, no murmur  Abdomen -  Soft, NTND, (+) BS  Extremities - No peripheral edema, No calf tenderness   Neurologic Exam:                    Cognitive -             Orientation: AA O x4     Speech - (+) mild dysarthria,  No aphasia     Cranial Nerves - Right pupil sluggish, left pupil reactive, Visual fields slightly impaired on right, flattening of right NLF, EOMI, Shoulder shrug intact, +dysarthria,      Motor -                      LEFT    UE - ShAB 5/5, EF 5/5, EE 5/5, WE 5/5,  WNL                    RIGHT UE - ShAB 5/5, EF 5/5, EE 4/5, WE 5/5,  WNL                    LEFT    LE - HF 5/5, KE 5/5, DF 5/5, PF 5/5                    RIGHT LE - 5/5        Sensory - diminished sensation in dorsum and plantar aspect of right foot     Coordination - FTN intact & HTS slightly impaired on right  Psychiatric - mood stable, appropriate  RECENT LABS                        11.8   6.00  )-----------( 268      ( 01 Jul 2021 05:00 )             36.6     07-01    138  |  106  |  48<H>  ----------------------------<  207<H>  5.0   |  26  |  2.59<H>    Ca    8.6      01 Jul 2021 05:00    TPro  6.5  /  Alb  2.6<L>  /  TBili  0.4  /  DBili  0.1  /  AST  31  /  ALT  27  /  AlkPhos  142<H>  07-01            RADIOLOGY/OTHER RESULTS      MEDICATIONS  (STANDING):  amLODIPine   Tablet 10 milliGRAM(s) Oral daily  apixaban 2.5 milliGRAM(s) Oral every 12 hours  atorvastatin 80 milliGRAM(s) Oral at bedtime  citalopram 20 milliGRAM(s) Oral daily  dextrose 40% Gel 15 Gram(s) Oral once  dextrose 5%. 1000 milliLiter(s) (50 mL/Hr) IV Continuous <Continuous>  dextrose 5%. 1000 milliLiter(s) (100 mL/Hr) IV Continuous <Continuous>  dextrose 50% Injectable 25 Gram(s) IV Push once  dextrose 50% Injectable 12.5 Gram(s) IV Push once  dextrose 50% Injectable 25 Gram(s) IV Push once  gabapentin 600 milliGRAM(s) Oral at bedtime  glucagon  Injectable 1 milliGRAM(s) IntraMuscular once  hydrALAZINE 25 milliGRAM(s) Oral every 8 hours  insulin glargine Injectable (LANTUS) 8 Unit(s) SubCutaneous at bedtime  insulin lispro (ADMELOG) corrective regimen sliding scale   SubCutaneous three times a day before meals  insulin lispro (ADMELOG) corrective regimen sliding scale   SubCutaneous at bedtime  labetalol 50 milliGRAM(s) Oral two times a day  nicotine - 21 mG/24Hr(s) Patch 1 patch Transdermal daily  polyethylene glycol 3350 17 Gram(s) Oral daily  senna 2 Tablet(s) Oral at bedtime  traZODone 50 milliGRAM(s) Oral at bedtime    MEDICATIONS  (PRN):  acetaminophen    Suspension .. 650 milliGRAM(s) Oral every 6 hours PRN Temp greater or equal to 38C (100.4F), Mild Pain (1 - 3)  bisacodyl 5 milliGRAM(s) Oral every 12 hours PRN Constipation

## 2021-07-01 NOTE — PROGRESS NOTE ADULT - SUBJECTIVE AND OBJECTIVE BOX
Patient is a 55y old  Male who presents with a chief complaint of Acute right corona radiata CVA (01 Jul 2021 10:59)      SUBJECTIVE / OVERNIGHT EVENTS:  Pt seen and examined at bedside. No acute events overnight.  Pt denies cp, palpitations, sob, abd pain, N/V, fever, chills.    ROS:  All other review of systems negative    Allergies    No Known Allergies    Intolerances        MEDICATIONS  (STANDING):  amLODIPine   Tablet 10 milliGRAM(s) Oral <User Schedule>  apixaban 2.5 milliGRAM(s) Oral every 12 hours  atorvastatin 80 milliGRAM(s) Oral at bedtime  citalopram 20 milliGRAM(s) Oral daily  dextrose 40% Gel 15 Gram(s) Oral once  dextrose 5%. 1000 milliLiter(s) (50 mL/Hr) IV Continuous <Continuous>  dextrose 5%. 1000 milliLiter(s) (100 mL/Hr) IV Continuous <Continuous>  dextrose 50% Injectable 25 Gram(s) IV Push once  dextrose 50% Injectable 12.5 Gram(s) IV Push once  dextrose 50% Injectable 25 Gram(s) IV Push once  gabapentin 600 milliGRAM(s) Oral at bedtime  glucagon  Injectable 1 milliGRAM(s) IntraMuscular once  hydrALAZINE 25 milliGRAM(s) Oral every 8 hours  insulin glargine Injectable (LANTUS) 8 Unit(s) SubCutaneous at bedtime  insulin lispro (ADMELOG) corrective regimen sliding scale   SubCutaneous three times a day before meals  insulin lispro (ADMELOG) corrective regimen sliding scale   SubCutaneous at bedtime  labetalol 50 milliGRAM(s) Oral two times a day  nicotine - 21 mG/24Hr(s) Patch 1 patch Transdermal daily  polyethylene glycol 3350 17 Gram(s) Oral daily  senna 2 Tablet(s) Oral at bedtime  sodium chloride 0.45%. 1000 milliLiter(s) (60 mL/Hr) IV Continuous <Continuous>  traZODone 50 milliGRAM(s) Oral at bedtime    MEDICATIONS  (PRN):  acetaminophen    Suspension .. 650 milliGRAM(s) Oral every 6 hours PRN Temp greater or equal to 38C (100.4F), Mild Pain (1 - 3)  bisacodyl 5 milliGRAM(s) Oral every 12 hours PRN Constipation      Vital Signs Last 24 Hrs  T(C): 36.7 (01 Jul 2021 07:41), Max: 36.8 (30 Jun 2021 23:40)  T(F): 98.1 (01 Jul 2021 07:41), Max: 98.2 (30 Jun 2021 23:40)  HR: 73 (01 Jul 2021 07:41) (69 - 96)  BP: 153/86 (01 Jul 2021 07:41) (134/84 - 186/112)  BP(mean): --  RR: 14 (01 Jul 2021 07:41) (14 - 16)  SpO2: 98% (01 Jul 2021 07:41) (97% - 99%)  CAPILLARY BLOOD GLUCOSE      POCT Blood Glucose.: 176 mg/dL (01 Jul 2021 07:34)  POCT Blood Glucose.: 163 mg/dL (30 Jun 2021 21:07)  POCT Blood Glucose.: 194 mg/dL (30 Jun 2021 16:39)  POCT Blood Glucose.: 198 mg/dL (30 Jun 2021 11:55)    I&O's Summary    30 Jun 2021 07:01  -  01 Jul 2021 07:00  --------------------------------------------------------  IN: 0 mL / OUT: 850 mL / NET: -850 mL    01 Jul 2021 07:01  -  01 Jul 2021 11:26  --------------------------------------------------------  IN: 476 mL / OUT: 650 mL / NET: -174 mL        PHYSICAL EXAM:  GENERAL: NAD, well-developed  HEAD:  Atraumatic, Normocephalic  EYES: EOMI, PERRLA, conjunctiva and sclera clear  NECK: Supple, No JVD  CHEST/LUNG: Clear to auscultation bilaterally; No wheeze, nonlabored breathing  HEART: Regular rate and rhythm; No murmurs, rubs, or gallops  ABDOMEN: Soft, Nontender, Nondistended; Bowel sounds present  EXTREMITIES:  2+ Peripheral Pulses, No clubbing, cyanosis, or edema    LABS:                        11.8   6.00  )-----------( 268      ( 01 Jul 2021 05:00 )             36.6     07-01    138  |  106  |  48<H>  ----------------------------<  207<H>  5.0   |  26  |  2.59<H>    Ca    8.6      01 Jul 2021 05:00    TPro  6.5  /  Alb  2.6<L>  /  TBili  0.4  /  DBili  0.1  /  AST  31  /  ALT  27  /  AlkPhos  142<H>  07-01              RADIOLOGY & ADDITIONAL TESTS:  Results Reviewed:   Imaging Personally Reviewed:  Electrocardiogram Personally Reviewed:    COORDINATION OF CARE:  Care Discussed with Consultants/Other Providers [Y/N]:  Prior or Outpatient Records Reviewed [Y/N]:

## 2021-07-01 NOTE — PROGRESS NOTE ADULT - ASSESSMENT
MARK ANTHONY QUINTANA is a 55M with PMHx of HTN, DM, HLD, ,depression, and prior CVA with no residual deifcits, who presented to [facility] on 06/11/2021 with dysphagia and difficulty ambulating, & dysarthria found to have an acute right corona radiata infarct. Admitted for multidisciplinary rehab program.      #Comprehensive Multidisciplinary Rehab Program:  - Gait, ADL, Functional impairments  - PT/OT/ SLP 3 hours a day 5 days a week  -recreation therapy     #Acute corona radiata infarct and multiple b/l subacute infarcts  - distribution of b/l subacute infarcts suggestive of cardioembolic origin  - not a candidate for tPA or thrombectomy  - c/w Eliquis 2.5mg q12h and atorvastatin 80mg qhs    #HLD  #HTN  --monitor BP -- d/w hospitalist-- cont. current medications  - c/w amlodipine 10mg daily and labetalol  50mg q12h  - cont. hydralazine  25mg TID   - c/w atorvastatin    #CKD  -  Cr elevated  --IVFs 1/2 NS ordered    #DM  - FS and ISS  -Lantus 8 units  -- Adjustment as per hospitalist  - DC home on Metformin 500mg BID, Glipizide 5mg daily, and Sitagliptin 50mg daily    #Neuropathy  - c/w gabapentin 600mg qhs    #Tobacco use  - Nicotine 21mg/24hr    #Mood/Depression  - c/w Citalopram 20mg daily    #Sleep:  - Maintain quiet hours and low stim environment;  --nursing order to turn off TV at 9PM to encourage proper sleep hygiene  - cont.  trazodone    #Pain:  - Tylenol PRN  - avoid sedating meds that may affect cognitive recovery    #GI/Bowel:  - Senna 2 tabs qhs and Miralax PRN  - PO Dulcolax PRN    #/Bladder:  - continent      #Skin/ Pressure Injury Prevention:  - assessment on admission   - Turn Q2hrs in bed while awake, OOB to Chair, PT/OT/SLP     #DVT prophylaxis:  - Eliquis  - SCDs  - neg for b/l LE DVTs at [facility]    #Precautions/ Restrictions  - Falls  - Lungs: Aspiration,   - COVID PCR: neg 6/11    #Dispo: IDR 06/24/2021  - OT: setup/supervision eating/grooming/UBD; min assist toilet transfer/LBD/shower; CG bathing  - PT: CG/min assist transfer; min assist ambulation 70ft with RW; min assist 8 steps with 2 hand rails  - SLP: mod-severe cog deficits--impaired PS, needs assist with money management and meds, impaired memory, +hs insight into deficits; (+) dysarthria; reg/thins  - Goals: Oralia transfers, supervision ambulation 150', supervision 12 steps, oralia eating/grooming/UBD, supervision LBD/bathing/functional transfers--Therapists to clarify if pt. can go home with Intermittent Supervision.  SW has had difficulty getting in touch with pt's [facility] program to extend HHA hours.  Will f/u  with niece regarding if family can provide supervision   - TDD: DC home vs [facility] 7/9    #LABs  - CBC BMP 6/28    --------------------------------------------  Outpatient Follow up:  Neurology - Dr. Nena Dumont on 07/22/2021 @ 11AM via telehealth  PCP  ----------------------------------------

## 2021-07-02 LAB
ANION GAP SERPL CALC-SCNC: 5 MMOL/L — SIGNIFICANT CHANGE UP (ref 5–17)
BUN SERPL-MCNC: 43 MG/DL — HIGH (ref 7–23)
CALCIUM SERPL-MCNC: 8.8 MG/DL — SIGNIFICANT CHANGE UP (ref 8.4–10.5)
CHLORIDE SERPL-SCNC: 105 MMOL/L — SIGNIFICANT CHANGE UP (ref 96–108)
CO2 SERPL-SCNC: 28 MMOL/L — SIGNIFICANT CHANGE UP (ref 22–31)
CREAT SERPL-MCNC: 2.58 MG/DL — HIGH (ref 0.5–1.3)
GLUCOSE BLDC GLUCOMTR-MCNC: 131 MG/DL — HIGH (ref 70–99)
GLUCOSE BLDC GLUCOMTR-MCNC: 176 MG/DL — HIGH (ref 70–99)
GLUCOSE BLDC GLUCOMTR-MCNC: 179 MG/DL — HIGH (ref 70–99)
GLUCOSE BLDC GLUCOMTR-MCNC: 288 MG/DL — HIGH (ref 70–99)
GLUCOSE SERPL-MCNC: 124 MG/DL — HIGH (ref 70–99)
POTASSIUM SERPL-MCNC: 5.4 MMOL/L — HIGH (ref 3.5–5.3)
POTASSIUM SERPL-SCNC: 5.4 MMOL/L — HIGH (ref 3.5–5.3)
SODIUM SERPL-SCNC: 138 MMOL/L — SIGNIFICANT CHANGE UP (ref 135–145)

## 2021-07-02 PROCEDURE — 99232 SBSQ HOSP IP/OBS MODERATE 35: CPT

## 2021-07-02 RX ORDER — HYDRALAZINE HCL 50 MG
25 TABLET ORAL EVERY 8 HOURS
Refills: 0 | Status: DISCONTINUED | OUTPATIENT
Start: 2021-07-02 | End: 2021-07-06

## 2021-07-02 RX ORDER — AMLODIPINE BESYLATE 2.5 MG/1
10 TABLET ORAL
Refills: 0 | Status: DISCONTINUED | OUTPATIENT
Start: 2021-07-02 | End: 2021-07-23

## 2021-07-02 RX ORDER — SODIUM POLYSTYRENE SULFONATE 4.1 MEQ/G
30 POWDER, FOR SUSPENSION ORAL ONCE
Refills: 0 | Status: COMPLETED | OUTPATIENT
Start: 2021-07-02 | End: 2021-07-02

## 2021-07-02 RX ORDER — SODIUM ZIRCONIUM CYCLOSILICATE 10 G/10G
10 POWDER, FOR SUSPENSION ORAL ONCE
Refills: 0 | Status: COMPLETED | OUTPATIENT
Start: 2021-07-02 | End: 2021-07-02

## 2021-07-02 RX ADMIN — AMLODIPINE BESYLATE 10 MILLIGRAM(S): 2.5 TABLET ORAL at 16:14

## 2021-07-02 RX ADMIN — POLYETHYLENE GLYCOL 3350 17 GRAM(S): 17 POWDER, FOR SOLUTION ORAL at 16:12

## 2021-07-02 RX ADMIN — Medication 25 MILLIGRAM(S): at 21:14

## 2021-07-02 RX ADMIN — SODIUM ZIRCONIUM CYCLOSILICATE 10 GRAM(S): 10 POWDER, FOR SUSPENSION ORAL at 19:04

## 2021-07-02 RX ADMIN — Medication 50 MILLIGRAM(S): at 21:14

## 2021-07-02 RX ADMIN — INSULIN GLARGINE 8 UNIT(S): 100 INJECTION, SOLUTION SUBCUTANEOUS at 21:15

## 2021-07-02 RX ADMIN — Medication 25 MILLIGRAM(S): at 05:47

## 2021-07-02 RX ADMIN — GABAPENTIN 600 MILLIGRAM(S): 400 CAPSULE ORAL at 21:15

## 2021-07-02 RX ADMIN — Medication 50 MILLIGRAM(S): at 05:45

## 2021-07-02 RX ADMIN — Medication 1: at 16:51

## 2021-07-02 RX ADMIN — ATORVASTATIN CALCIUM 80 MILLIGRAM(S): 80 TABLET, FILM COATED ORAL at 21:14

## 2021-07-02 RX ADMIN — APIXABAN 2.5 MILLIGRAM(S): 2.5 TABLET, FILM COATED ORAL at 16:13

## 2021-07-02 RX ADMIN — Medication 25 MILLIGRAM(S): at 16:12

## 2021-07-02 RX ADMIN — Medication 1 PATCH: at 07:42

## 2021-07-02 RX ADMIN — CITALOPRAM 20 MILLIGRAM(S): 10 TABLET, FILM COATED ORAL at 16:12

## 2021-07-02 RX ADMIN — Medication 3: at 13:19

## 2021-07-02 RX ADMIN — Medication 50 MILLIGRAM(S): at 16:13

## 2021-07-02 RX ADMIN — APIXABAN 2.5 MILLIGRAM(S): 2.5 TABLET, FILM COATED ORAL at 05:48

## 2021-07-02 RX ADMIN — SENNA PLUS 2 TABLET(S): 8.6 TABLET ORAL at 21:14

## 2021-07-02 RX ADMIN — Medication 1 PATCH: at 12:57

## 2021-07-02 NOTE — PROVIDER CONTACT NOTE (OTHER) - NAME OF MD/NP/PA/DO NOTIFIED:
Dr. Barrios
Dr. Barrios
Dr. Galloway
Doctor Zelaya
Dr. Barrios
Dr. Galloway
Doctor Zelaya
Dr. Barrios

## 2021-07-02 NOTE — PROVIDER CONTACT NOTE (OTHER) - REASON
Blood pressure remains high after hydralazine given.
Pt blood pressure remains high after BP meds given
elevated BP
BP cont. to be elevated
elevated BP
hypertension
BP continues to be elevated.
Blood pressure lowered after dose of labetalol
Pt blood pressure lowered after BP medications given
elevated BP
Patient Blood pressure 175/103

## 2021-07-02 NOTE — PROVIDER CONTACT NOTE (OTHER) - BACKGROUND
Patient has PMHx of HTN, DM, HLD, depression, and prior CVA. Diagnosed with acute right corona radiata infarct.
pt. admitting dx of CVA. history of HTN, HLD, depression, diabetes.
PAtient has PMHx of HTN, DM, HLD, depression and prior CVA. Diagnosed with acute right corona radiata infarct.
Patient with PMHx of HTN, DM, HLD, and prior CVA. Diagnosed with acute right corona radiata infarct. Patient blood pressure has been high past couple of days.
pt. with dx of CVA, history of HTD, diabetes, HLD, depression, and prior CVA.
Patient is 54y/o male with history of HTN, DM, depression and prior CVA. Diagnosed with acute right corona radiata infarct.
Patient is 56 y/o male with PMHx of HTN, DM, HLD, and prior CVA. Diagnosed with Acute right corona radiata infarct.
pt. admitting dx of CVA. history of HTN, HLD, depression, diabetes.
R side CVA   DM   HTN   current smoker
pt. with dx of CVA, history of HTD, diabetes, HLD, depression, and prior CVA.
s/p right CVA, history of HTN, Diabetes,  HLD, depression and prior CVA.
pt. admitting dx of CVA. history of HTN, HLD, depression, diabetes.

## 2021-07-02 NOTE — PROGRESS NOTE ADULT - SUBJECTIVE AND OBJECTIVE BOX
Patient is a 55y old  Male who presents with a chief complaint of Acute right corona radiata CVA (01 Jul 2021 11:26)      SUBJECTIVE / OVERNIGHT EVENTS:  Pt seen and examined at bedside. No acute events overnight.  Pt denies cp, palpitations, sob, abd pain, N/V, fever, chills.    ROS:  All other review of systems negative    Allergies    No Known Allergies    Intolerances        MEDICATIONS  (STANDING):  amLODIPine   Tablet 10 milliGRAM(s) Oral <User Schedule>  apixaban 2.5 milliGRAM(s) Oral every 12 hours  atorvastatin 80 milliGRAM(s) Oral at bedtime  citalopram 20 milliGRAM(s) Oral daily  dextrose 40% Gel 15 Gram(s) Oral once  dextrose 5%. 1000 milliLiter(s) (50 mL/Hr) IV Continuous <Continuous>  dextrose 5%. 1000 milliLiter(s) (100 mL/Hr) IV Continuous <Continuous>  dextrose 50% Injectable 25 Gram(s) IV Push once  dextrose 50% Injectable 12.5 Gram(s) IV Push once  dextrose 50% Injectable 25 Gram(s) IV Push once  gabapentin 600 milliGRAM(s) Oral at bedtime  glucagon  Injectable 1 milliGRAM(s) IntraMuscular once  hydrALAZINE 25 milliGRAM(s) Oral every 8 hours  insulin glargine Injectable (LANTUS) 8 Unit(s) SubCutaneous at bedtime  insulin lispro (ADMELOG) corrective regimen sliding scale   SubCutaneous three times a day before meals  insulin lispro (ADMELOG) corrective regimen sliding scale   SubCutaneous at bedtime  labetalol 50 milliGRAM(s) Oral two times a day  nicotine - 21 mG/24Hr(s) Patch 1 patch Transdermal daily  polyethylene glycol 3350 17 Gram(s) Oral daily  senna 2 Tablet(s) Oral at bedtime  traZODone 50 milliGRAM(s) Oral at bedtime    MEDICATIONS  (PRN):  acetaminophen    Suspension .. 650 milliGRAM(s) Oral every 6 hours PRN Temp greater or equal to 38C (100.4F), Mild Pain (1 - 3)  bisacodyl 5 milliGRAM(s) Oral every 12 hours PRN Constipation      Vital Signs Last 24 Hrs  T(C): 36.8 (01 Jul 2021 19:39), Max: 36.8 (01 Jul 2021 19:39)  T(F): 98.2 (01 Jul 2021 19:39), Max: 98.2 (01 Jul 2021 19:39)  HR: 78 (02 Jul 2021 05:43) (78 - 90)  BP: 155/87 (02 Jul 2021 05:43) (109/68 - 196/121)  BP(mean): --  RR: 15 (01 Jul 2021 19:39) (15 - 15)  SpO2: 99% (01 Jul 2021 19:39) (99% - 99%)  CAPILLARY BLOOD GLUCOSE      POCT Blood Glucose.: 131 mg/dL (02 Jul 2021 07:53)  POCT Blood Glucose.: 168 mg/dL (01 Jul 2021 21:12)  POCT Blood Glucose.: 273 mg/dL (01 Jul 2021 16:23)  POCT Blood Glucose.: 246 mg/dL (01 Jul 2021 11:51)    I&O's Summary    01 Jul 2021 07:01  -  02 Jul 2021 07:00  --------------------------------------------------------  IN: 713 mL / OUT: 1400 mL / NET: -687 mL        PHYSICAL EXAM:  GENERAL: NAD, well-developed  HEAD:  Atraumatic, Normocephalic  CHEST/LUNG: Clear to auscultation bilaterally; No wheeze, nonlabored breathing  HEART: Regular rate and rhythm; No murmurs, rubs, or gallops  ABDOMEN: Soft, Nontender, Nondistended; Bowel sounds present  EXTREMITIES:  2+ Peripheral Pulses, No clubbing, cyanosis, or edema  NEUROLOGY: AAOx3, non-focal  PSYCH: calm, appropriate mood    LABS:                        11.8   6.00  )-----------( 268      ( 01 Jul 2021 05:00 )             36.6     07-02    138  |  105  |  43<H>  ----------------------------<  124<H>  5.4<H>   |  28  |  2.58<H>    Ca    8.8      02 Jul 2021 06:30    TPro  6.5  /  Alb  2.6<L>  /  TBili  0.4  /  DBili  0.1  /  AST  31  /  ALT  27  /  AlkPhos  142<H>  07-01              RADIOLOGY & ADDITIONAL TESTS:  Results Reviewed:   Imaging Personally Reviewed:  Electrocardiogram Personally Reviewed:    COORDINATION OF CARE:  Care Discussed with Consultants/Other Providers [Y/N]:  Prior or Outpatient Records Reviewed [Y/N]:

## 2021-07-02 NOTE — PROVIDER CONTACT NOTE (OTHER) - DATE AND TIME:
30-Jun-2021 21:15
30-Jun-2021 22:20
01-Jul-2021 20:50
01-Jul-2021 22:27
24-Jun-2021 21:20
01-Jul-2021 23:18
30-Jun-2021 23:45
01-Jul-2021 19:39
02-Jul-2021 00:28
25-Jun-2021 21:14
25-Jun-2021 22:40
21-Jun-2021 22:25
24-Jun-2021 22:29

## 2021-07-02 NOTE — PROGRESS NOTE ADULT - ASSESSMENT
MARK ANTHONY QUINTANA is a 55M with PMHx of HTN, DM, HLD, ,depression, and prior CVA with no residual deifcits, who presented to [facility] on 06/11/2021 with dysphagia and difficulty ambulating, & dysarthria found to have an acute right corona radiata infarct. Admitted for multidisciplinary rehab program.      #Comprehensive Multidisciplinary Rehab Program:  - Gait, ADL, Functional impairments  - PT/OT/ SLP 3 hours a day 5 days a week  -recreation therapy     #Acute corona radiata infarct and multiple b/l subacute infarcts  - distribution of b/l subacute infarcts suggestive of cardioembolic origin  - not a candidate for tPA or thrombectomy  - c/w Eliquis 2.5mg q12h and atorvastatin 80mg qhs    #HLD  #HTN  -- BP elevated -- d/w hospitalist--  - changed amlodipine 10mg to evening and cont. labetalol  50mg q12h  - cont. hydralazine  25mg TID --if BP still elevated after changing amlodipine, can increase hydralazine to 50mg q.8h  - c/w atorvastatin    #CKD  -  Cr stable      #DM  - FS and ISS  -Lantus 8 units  -- Adjustment as per hospitalist  - DC home on Metformin 500mg BID, Glipizide 5mg daily, and Sitagliptin 50mg daily    #Neuropathy  - c/w gabapentin 600mg qhs    #Tobacco use  - Nicotine 21mg/24hr    #Mood/Depression  - c/w Citalopram 20mg daily    #Sleep:  - Maintain quiet hours and low stim environment;  --nursing order to turn off TV at 9PM to encourage proper sleep hygiene  - cont.  trazodone    #Pain:  - Tylenol PRN  - avoid sedating meds that may affect cognitive recovery    #GI/Bowel:  - Senna 2 tabs qhs and Miralax PRN  - PO Dulcolax PRN    #/Bladder:  - continent      #Skin/ Pressure Injury Prevention:  - assessment on admission   - Turn Q2hrs in bed while awake, OOB to Chair, PT/OT/SLP     #DVT prophylaxis:  - Eliquis  - SCDs  - neg for b/l LE DVTs at [facility]    #Precautions/ Restrictions  - Falls  - Lungs: Aspiration,   - COVID PCR: neg 6/11    #Dispo: IDR 06/24/2021  - OT: setup/supervision eating/grooming/UBD; min assist toilet transfer/LBD/shower; CG bathing  - PT: CG/min assist transfer; min assist ambulation 70ft with RW; min assist 8 steps with 2 hand rails  - SLP: mod-severe cog deficits--impaired PS, needs assist with money management and meds, impaired memory, +hs insight into deficits; (+) dysarthria; reg/thins  - Goals: Oralia transfers, supervision ambulation 150', supervision 12 steps, oralia eating/grooming/UBD, supervision LBD/bathing/functional transfers--Therapists to clarify if pt. can go home with Intermittent Supervision.  SW has had difficulty getting in touch with pt's [facility] program to extend HHA hours.  Will f/u  with niece regarding if family can provide supervision   - TDD: DC home vs [facility] 7/9    #LABs  - CBC BMP 6/28    --------------------------------------------  Outpatient Follow up:  Neurology - Dr. Nena Dumont on 07/22/2021 @ 11AM via telehealth  PCP  ----------------------------------------

## 2021-07-02 NOTE — PROVIDER CONTACT NOTE (OTHER) - SITUATION
Patient blood pressure has been running high past few nights. BP taken on right arm 175/103, HR 89; BP on left arm 196/121 HR 89, Manual BP taken 182/120. Patient asymptomatic. MD made aware.
pt. continues to have elevated BP of 172/95 (electronic), IM=806/100 (manual). HR=66.
BP continues to be elevated MA=685/98(manual)
Blood pressure reassessed after 50mg  labetalol given. BP lowered, 168/99, HR 90. Doctor Sophie made aware.
Patient blood pressure has been high through this shift. Given hydralazine with little change in BP. Labetalol given. BP now 109/68 HR 89. Patient no longer has headache. MD made aware.
Patient given 3 doses of 25 mg hydralazine, BP remains high- 180/102, HR 90. Patient complains of headache in front area of head. Doctor Garcia made aware.
Patient given two 25mg doses of hydralazine for high BP. Blood pressure retaken 188/111, HR 87. MD made aware.
elevated /105
mw=440/112
pt bp 168/90 Hr 78. pt previously received scheduled 50mg of Labetalol @1800 bp on arrival was 164/92 HR 66
pt. FZ=307/107
pt. continues to have elevated bp after additional dose of hydralazine . DA=237/92.
pt. BP cont. to be elevated. YM=314/100 (manual)

## 2021-07-02 NOTE — PROGRESS NOTE ADULT - ASSESSMENT
54 y/o M with PMHx of HTN, T2DM, HLD, depression, and prior CVA with no residual deficits, who presented to [facility] on 06/11/2021 with dysphagia and difficulty ambulating; found to have an acute right corona radiata infarct. Admitted for multidisciplinary rehab program.    #Acute corona radiata infarct and multiple b/l subacute infarcts  -Continue comprehensive rehab program - PT/OT/SLP per rehab team  -Eliquis 2.5mg q12h and atorvastatin 80mg qhs    #JOSE, baseline Cr unknown - probably CKD3 - stable  -Some improvement noted after IVF. Encourage PO hydration  -Avoid nephrotoxins, monitor bmp   -Nephro consult  -Renal sono reviewed - no b/l hydro, renal mass, or calculi visualized     #HLD  #HTN  -Still uncontrolled reading in the evening. Will see how pt does today after giving Amlodipine in the evening at 6pm  -Continue Hydralazine + Labetalol. If continues to be hypertensive, can increase Hydralazine to 50mg TID   -Lipitor 80mg qhs    #T2DM, A1C 5.9 - controlled  -FS and ISS  -DC home on Metformin 500mg BID, Glipizide 5mg daily, and Sitagliptin 50mg daily  -Lantus 8un qhs while in rehab    #Neuropathy  -Gabapentin 600mg qhs    #Chronic Tobacco use  -Nicotine 21mg/24hr  -Smoking cessation counseling provided    #Mood/Depression  -Citalopram 20mg qd    #Moderate protein-calorie malnutrition   -Dietary/nutrition appreciated     #DVT ppx - Eliquis  -neg for b/l LE DVTs at [facility] 56 y/o M with PMHx of HTN, T2DM, HLD, depression, and prior CVA with no residual deficits, who presented to [facility] on 06/11/2021 with dysphagia and difficulty ambulating; found to have an acute right corona radiata infarct. Admitted for multidisciplinary rehab program.    #Acute corona radiata infarct and multiple b/l subacute infarcts  -Continue comprehensive rehab program - PT/OT/SLP per rehab team  -Eliquis 2.5mg q12h and atorvastatin 80mg qhs    #JOSE, baseline Cr unknown - probably CKD3 - stable  #Hyperkalemia  -Some improvement noted in renal function after IVF. Encourage PO hydration  -Avoid nephrotoxins, monitor bmp   -Nephro on board  -Renal sono reviewed - no b/l hydro, renal mass, or calculi visualized   -Kayexalate one time   -Monitor K levels    #HLD  #HTN  -Still uncontrolled reading in the evening. Will see how pt does today after giving Amlodipine in the evening at 6pm  -Continue Hydralazine + Labetalol. If continues to be hypertensive, can increase Hydralazine to 50mg TID   -Lipitor 80mg qhs    #T2DM, A1C 5.9 - controlled  -FS and ISS  -DC home on Metformin 500mg BID, Glipizide 5mg daily, and Sitagliptin 50mg daily  -Lantus 8un qhs while in rehab    #Neuropathy  -Gabapentin 600mg qhs    #Chronic Tobacco use  -Nicotine 21mg/24hr  -Smoking cessation counseling provided    #Mood/Depression  -Citalopram 20mg qd    #Moderate protein-calorie malnutrition   -Dietary/nutrition appreciated     #DVT ppx - Eliquis  -neg for b/l LE DVTs at [facility]

## 2021-07-02 NOTE — PROGRESS NOTE ADULT - SUBJECTIVE AND OBJECTIVE BOX
HPI:  MARK ANTHONY QUINTANA is a 55M with PMHx of HTN, DM, HLD, ,depression, and prior CVA with no residual deficits, who presented to [facility] on 06/11/2021 with several days of dysphagia and difficulty ambulating. Patient had been experiencing symptoms since 6/8 after coming home from a dental appointment. He was brought to the hospital after a wellness check from his insurance company. In the ED, his Utox tested positive for cocaine, though patient denied use, and CT head scan showed findings c/w an acute right corona radiata infarct, along with multiple b/l subacute infarcts. Patient was managed medically, as he was not a candidate for either tPA or thrombectomy.    Patient was evaluated by PM&R and therapy for functional deficits and gait/ ADL impairments and recommended acute rehabilitation. Patient was medically optimized for discharge to [facility] on 06/21/2021. Patient was seen and examined at the bedside upon arrival.  (21 Jun 2021 15:04)      PAST MEDICAL & SURGICAL HISTORY:  Cerebrovascular accident (CVA)    Hypertension    Hyperlipidemia    Diabetes mellitus    Depression        Subjective: no new complaints.  BP elevated overnight up to 180s/120.        VITALS  Vital Signs Last 24 Hrs  T(C): 36.8 (02 Jul 2021 10:05), Max: 36.8 (01 Jul 2021 19:39)  T(F): 98.3 (02 Jul 2021 10:05), Max: 98.3 (02 Jul 2021 10:05)  HR: 69 (02 Jul 2021 17:54) (66 - 90)  BP: 155/93 (02 Jul 2021 17:54) (109/68 - 196/121)  BP(mean): --  RR: 15 (02 Jul 2021 10:05) (15 - 15)  SpO2: 99% (02 Jul 2021 10:05) (99% - 99%)    REVIEW OF SYMPTOMS  Neurological deficits    PHYSICAL EXAM  Constitutional - NAD, Comfortable  HEENT - NCAT  Chest - CTAB   Cardio - warm and well perfused, RRR, no murmur  Abdomen -  Soft, NTND, (+) BS  Extremities - No peripheral edema, No calf tenderness   Neurologic Exam:                    Cognitive -             Orientation: AA O x4     Speech - (+) mild dysarthria,  No aphasia     Cranial Nerves - Right pupil sluggish, left pupil reactive, Visual fields slightly impaired on right, flattening of right NLF, EOMI, Shoulder shrug intact, +dysarthria,      Motor -                      LEFT    UE - ShAB 5/5, EF 5/5, EE 5/5, WE 5/5,  WNL                    RIGHT UE - ShAB 5/5, EF 5/5, EE 4/5, WE 5/5,  WNL                    LEFT    LE - HF 5/5, KE 5/5, DF 5/5, PF 5/5                    RIGHT LE - 5/5        Sensory - diminished sensation in dorsum and plantar aspect of right foot     Coordination - FTN intact & HTS intact  Psychiatric - mood stable, appropriate    RECENT LABS                        11.8   6.00  )-----------( 268      ( 01 Jul 2021 05:00 )             36.6     07-02    138  |  105  |  43<H>  ----------------------------<  124<H>  5.4<H>   |  28  |  2.58<H>    Ca    8.8      02 Jul 2021 06:30    TPro  6.5  /  Alb  2.6<L>  /  TBili  0.4  /  DBili  0.1  /  AST  31  /  ALT  27  /  AlkPhos  142<H>  07-01            RADIOLOGY/OTHER RESULTS      MEDICATIONS  (STANDING):  amLODIPine   Tablet 10 milliGRAM(s) Oral <User Schedule>  apixaban 2.5 milliGRAM(s) Oral every 12 hours  atorvastatin 80 milliGRAM(s) Oral at bedtime  citalopram 20 milliGRAM(s) Oral daily  dextrose 40% Gel 15 Gram(s) Oral once  dextrose 5%. 1000 milliLiter(s) (50 mL/Hr) IV Continuous <Continuous>  dextrose 5%. 1000 milliLiter(s) (100 mL/Hr) IV Continuous <Continuous>  dextrose 50% Injectable 25 Gram(s) IV Push once  dextrose 50% Injectable 12.5 Gram(s) IV Push once  dextrose 50% Injectable 25 Gram(s) IV Push once  gabapentin 600 milliGRAM(s) Oral at bedtime  glucagon  Injectable 1 milliGRAM(s) IntraMuscular once  hydrALAZINE 25 milliGRAM(s) Oral every 8 hours  insulin glargine Injectable (LANTUS) 8 Unit(s) SubCutaneous at bedtime  insulin lispro (ADMELOG) corrective regimen sliding scale   SubCutaneous three times a day before meals  insulin lispro (ADMELOG) corrective regimen sliding scale   SubCutaneous at bedtime  labetalol 50 milliGRAM(s) Oral two times a day  nicotine - 21 mG/24Hr(s) Patch 1 patch Transdermal daily  polyethylene glycol 3350 17 Gram(s) Oral daily  senna 2 Tablet(s) Oral at bedtime  sodium zirconium cyclosilicate 10 Gram(s) Oral once  traZODone 50 milliGRAM(s) Oral at bedtime    MEDICATIONS  (PRN):  acetaminophen    Suspension .. 650 milliGRAM(s) Oral every 6 hours PRN Temp greater or equal to 38C (100.4F), Mild Pain (1 - 3)  bisacodyl 5 milliGRAM(s) Oral every 12 hours PRN Constipation

## 2021-07-02 NOTE — PROVIDER CONTACT NOTE (OTHER) - ASSESSMENT
Blood pressure reassessed after 50mg  labetalol given. BP lowered, 168/99, HR 90. Patient still has headache. Doctor Sophie made aware.
RX=981/105, HR=67, Temp=98F, O2=96%, RR=16. pt. resting comfortably in bed no complaints of pain or discomfort.
BP taken on right arm 175/103, HR 89; BP on left arm 196/121 HR 89, Manual BP taken 182/120. Patient asymptomatic.
/68, Hr 89. Patient no longer has headache.
Patient given 3 doses of 25 mg hydralazine, BP remains high- 180/102, HR 90. Patient complains of headache in front area of head. Doctor Gracia made aware.
Patient given two 25mg doses of hydralazine for high BP. Blood pressure retaken 188/111, HR 87. Patient asymptomatic.  MD made aware.
pt. resting in bed no complaints of pain or discomfort. XB=706/92, HR=65, Temp=98.1F, O2=99%.
pt. with no complains of headache or discomfort. IF=761/112, HR=75, O2=97%, RR= 16,Temp=97.8F
no change in pt status . pt denies any pain/ discomfort.
pt. ZC=101/107, HR=66, T=97.9F (oral), RR=15, O2-95%. pt. with no complaints of pain or discomfort at this time. pt. on LR at 100Ml/hr.
pt. complaining of upper gum pain, but denies additional pain. resting in bed. MR=945/98, HR=96, Temp=98.2F, RR=15, O2=97%.
pt. in bed watching TV, no signs or symptoms of distress.
pt. with no complaints of pain or discomfort. pt. watching TV in bed.

## 2021-07-02 NOTE — PROVIDER CONTACT NOTE (OTHER) - ACTION/TREATMENT ORDERED:
MD aware. no changes at this time. recheck BP in morning.
MD aware. x1 dose of 50mg Labetalol ordered and given
MD aware. Tylenol given for Gum pain. reassess BP in 45min.
MD made aware.
Monitor patient for any changes/ increased blood pressure.
one time does of Hydralazine ordered.
scheduled hydralazine given as ordered. MD aware.
Labetalol 50mg STAT dose. Tylenol given for headache.
STAT order Hydralazine 25mg.
Scheduled 25mg Hydralazine given plus additional STAT hydralazine 25mg.
Scheduled Hydralazine given as ordered. MD made aware.
additional dose of hydralazine ordered, fluids decreased to LR 50ml/hr.
additional dose of Hydralazine ordered and given.

## 2021-07-03 LAB
GLUCOSE BLDC GLUCOMTR-MCNC: 128 MG/DL — HIGH (ref 70–99)
GLUCOSE BLDC GLUCOMTR-MCNC: 136 MG/DL — HIGH (ref 70–99)
GLUCOSE BLDC GLUCOMTR-MCNC: 167 MG/DL — HIGH (ref 70–99)
GLUCOSE BLDC GLUCOMTR-MCNC: 179 MG/DL — HIGH (ref 70–99)

## 2021-07-03 PROCEDURE — 99232 SBSQ HOSP IP/OBS MODERATE 35: CPT

## 2021-07-03 RX ADMIN — INSULIN GLARGINE 8 UNIT(S): 100 INJECTION, SOLUTION SUBCUTANEOUS at 21:27

## 2021-07-03 RX ADMIN — Medication 50 MILLIGRAM(S): at 05:48

## 2021-07-03 RX ADMIN — Medication 50 MILLIGRAM(S): at 21:27

## 2021-07-03 RX ADMIN — AMLODIPINE BESYLATE 10 MILLIGRAM(S): 2.5 TABLET ORAL at 17:37

## 2021-07-03 RX ADMIN — ATORVASTATIN CALCIUM 80 MILLIGRAM(S): 80 TABLET, FILM COATED ORAL at 21:27

## 2021-07-03 RX ADMIN — APIXABAN 2.5 MILLIGRAM(S): 2.5 TABLET, FILM COATED ORAL at 17:37

## 2021-07-03 RX ADMIN — Medication 25 MILLIGRAM(S): at 13:54

## 2021-07-03 RX ADMIN — Medication 50 MILLIGRAM(S): at 17:37

## 2021-07-03 RX ADMIN — Medication 1: at 11:34

## 2021-07-03 RX ADMIN — Medication 1 PATCH: at 19:45

## 2021-07-03 RX ADMIN — Medication 25 MILLIGRAM(S): at 05:48

## 2021-07-03 RX ADMIN — APIXABAN 2.5 MILLIGRAM(S): 2.5 TABLET, FILM COATED ORAL at 05:48

## 2021-07-03 RX ADMIN — Medication 1 PATCH: at 11:29

## 2021-07-03 RX ADMIN — GABAPENTIN 600 MILLIGRAM(S): 400 CAPSULE ORAL at 21:27

## 2021-07-03 RX ADMIN — CITALOPRAM 20 MILLIGRAM(S): 10 TABLET, FILM COATED ORAL at 11:29

## 2021-07-03 RX ADMIN — Medication 25 MILLIGRAM(S): at 21:27

## 2021-07-03 NOTE — PROGRESS NOTE ADULT - ASSESSMENT
CVA  Eliquis/Lipitor  PT/OT per rehab    JOSE, hyperkalemia  s/p Kayxelate  repeat BMP    HTN  Norvasc, Labetalol, Hydralazine    HLD  lipitor    DM2,  A1C 5.9  Lantus/Lispro    Neuropathy  Gabapentin    Dep  Celexa

## 2021-07-03 NOTE — PROGRESS NOTE ADULT - ASSESSMENT
MARK ANTHONY QUINTANA is a 55M with PMHx of HTN, DM, HLD, ,depression, and prior CVA with no residual deifcits, who presented to [facility] on 06/11/2021 with dysphagia and difficulty ambulating, & dysarthria found to have an acute right corona radiata infarct. Admitted for multidisciplinary rehab program.      #Comprehensive Multidisciplinary Rehab Program:  - Gait, ADL, Functional impairments  - PT/OT/ SLP 3 hours a day 5 days a week  -recreation therapy     #Acute corona radiata infarct and multiple b/l subacute infarcts  - distribution of b/l subacute infarcts suggestive of cardioembolic origin  - not a candidate for tPA or thrombectomy  - c/w Eliquis 2.5mg q12h and atorvastatin 80mg qhs    #HLD  - c/w atorvastatin    #HTN  -- BP improved-  - changed amlodipine 10mg to evening and cont. labetalol  50mg q12h  - cont. hydralazine  25mg TID --  --hospitalist following      #CKD  -  Cr stable      #DM  - FS and ISS  -Lantus 8 units  -- Adjustment as per hospitalist  - DC home on Metformin 500mg BID, Glipizide 5mg daily, and Sitagliptin 50mg daily    #Neuropathy  - c/w gabapentin 600mg qhs    #Tobacco use  - Nicotine 21mg/24hr    #Mood/Depression  - c/w Citalopram 20mg daily    #Sleep:  - Maintain quiet hours and low stim environment;  --nursing order to turn off TV at 9PM to encourage proper sleep hygiene  - cont.  trazodone    #Pain:  - Tylenol PRN  - avoid sedating meds that may affect cognitive recovery    #GI/Bowel:  - Senna 2 tabs qhs and Miralax PRN  - PO Dulcolax PRN    #/Bladder:  - continent      #Skin/ Pressure Injury Prevention:  - assessment on admission   - Turn Q2hrs in bed while awake, OOB to Chair, PT/OT/SLP     #DVT prophylaxis:  - Eliquis  - SCDs  - neg for b/l LE DVTs at [facility]    #Precautions/ Restrictions  - Falls  - Lungs: Aspiration,   - COVID PCR: neg 6/11    #Dispo: IDR 06/24/2021  - OT: setup/supervision eating/grooming/UBD; min assist toilet transfer/LBD/shower; CG bathing  - PT: CG/min assist transfer; min assist ambulation 70ft with RW; min assist 8 steps with 2 hand rails  - SLP: mod-severe cog deficits--impaired PS, needs assist with money management and meds, impaired memory, +hs insight into deficits; (+) dysarthria; reg/thins  - Goals: Oralia transfers, supervision ambulation 150', supervision 12 steps, oralia eating/grooming/UBD, supervision LBD/bathing/functional transfers--Therapists to clarify if pt. can go home with Intermittent Supervision.  SW has had difficulty getting in touch with pt's [facility] program to extend HHA hours.  Will f/u  with niece regarding if family can provide supervision   - TDD: DC home vs [facility]AYLENE 7/9    #LABs  - CBC [facility] 6/28    --------------------------------------------  Outpatient Follow up:  Neurology - Dr. Nena Dumont on 07/22/2021 @ 11AM via telehealth  PCP  ----------------------------------------

## 2021-07-03 NOTE — PROGRESS NOTE ADULT - SUBJECTIVE AND OBJECTIVE BOX
HPI:  MARK ANTHONY QUINTANA is a 55M with PMHx of HTN, DM, HLD, ,depression, and prior CVA with no residual deficits, who presented to [facility] on 06/11/2021 with several days of dysphagia and difficulty ambulating. Patient had been experiencing symptoms since 6/8 after coming home from a dental appointment. He was brought to the hospital after a wellness check from his insurance company. In the ED, his Utox tested positive for cocaine, though patient denied use, and CT head scan showed findings c/w an acute right corona radiata infarct, along with multiple b/l subacute infarcts. Patient was managed medically, as he was not a candidate for either tPA or thrombectomy.    Patient was evaluated by PM&R and therapy for functional deficits and gait/ ADL impairments and recommended acute rehabilitation. Patient was medically optimized for discharge to [facility] on 06/21/2021. Patient was seen and examined at the bedside upon arrival.  (21 Jun 2021 15:04)      Subjective    no event overnight.       PAST MEDICAL & SURGICAL HISTORY:  Cerebrovascular accident (CVA)    Hypertension    Hyperlipidemia    Diabetes mellitus    Depression        MedsMEDICATIONS  (STANDING):  amLODIPine   Tablet 10 milliGRAM(s) Oral <User Schedule>  apixaban 2.5 milliGRAM(s) Oral every 12 hours  atorvastatin 80 milliGRAM(s) Oral at bedtime  citalopram 20 milliGRAM(s) Oral daily  dextrose 40% Gel 15 Gram(s) Oral once  dextrose 5%. 1000 milliLiter(s) (50 mL/Hr) IV Continuous <Continuous>  dextrose 5%. 1000 milliLiter(s) (100 mL/Hr) IV Continuous <Continuous>  dextrose 50% Injectable 25 Gram(s) IV Push once  dextrose 50% Injectable 12.5 Gram(s) IV Push once  dextrose 50% Injectable 25 Gram(s) IV Push once  gabapentin 600 milliGRAM(s) Oral at bedtime  glucagon  Injectable 1 milliGRAM(s) IntraMuscular once  hydrALAZINE 25 milliGRAM(s) Oral every 8 hours  insulin glargine Injectable (LANTUS) 8 Unit(s) SubCutaneous at bedtime  insulin lispro (ADMELOG) corrective regimen sliding scale   SubCutaneous three times a day before meals  insulin lispro (ADMELOG) corrective regimen sliding scale   SubCutaneous at bedtime  labetalol 50 milliGRAM(s) Oral two times a day  nicotine - 21 mG/24Hr(s) Patch 1 patch Transdermal daily  polyethylene glycol 3350 17 Gram(s) Oral daily  senna 2 Tablet(s) Oral at bedtime  traZODone 50 milliGRAM(s) Oral at bedtime    MEDICATIONS  (PRN):  acetaminophen    Suspension .. 650 milliGRAM(s) Oral every 6 hours PRN Temp greater or equal to 38C (100.4F), Mild Pain (1 - 3)  bisacodyl 5 milliGRAM(s) Oral every 12 hours PRN Constipation      Vital Signs Last 24 Hrs  T(C): 36.6 (03 Jul 2021 08:08), Max: 36.6 (02 Jul 2021 20:07)  T(F): 97.9 (03 Jul 2021 08:08), Max: 97.9 (03 Jul 2021 08:08)  HR: 71 (03 Jul 2021 08:08) (69 - 85)  BP: 145/78 (03 Jul 2021 08:08) (138/80 - 155/93)  BP(mean): --  RR: 16 (03 Jul 2021 08:08) (15 - 16)  SpO2: 96% (03 Jul 2021 08:08) (94% - 98%)  I&O's Summary    02 Jul 2021 07:01  -  03 Jul 2021 07:00  --------------------------------------------------------  IN: 0 mL / OUT: 400 mL / NET: -400 mL        PHYSICAL EXAM:  GENERAL: NAD  NECK: Supple  NERVOUS SYSTEM:  awake  HEART: S1s2 NL , RRR  CHEST/LUNG: Clear to percussion bilaterally  ABDOMEN: Soft, Nontender, Nondistended; Bowel sounds present  EXTREMITIES:  No edema      LABS:  07-02    138  |  105  |  43<H>  ----------------------------<  124<H>  5.4<H>   |  28  |  2.58<H>    Ca    8.8      02 Jul 2021 06:30            RVP:          Tox:           CAPILLARY BLOOD GLUCOSE      POCT Blood Glucose.: 179 mg/dL (03 Jul 2021 11:33)  POCT Blood Glucose.: 136 mg/dL (03 Jul 2021 08:07)  POCT Blood Glucose.: 179 mg/dL (02 Jul 2021 21:13)  POCT Blood Glucose.: 176 mg/dL (02 Jul 2021 16:49)  POCT Blood Glucose.: 288 mg/dL (02 Jul 2021 12:59)      Imaging Personally Reviewed:  [ ] YES  [ ] NO        Care Discussed with Consultants/Other Providers [ x] YES  [ ] NO

## 2021-07-03 NOTE — PROGRESS NOTE ADULT - SUBJECTIVE AND OBJECTIVE BOX
HPI:  MARK ANTHONY QUINTANA is a 55M with PMHx of HTN, DM, HLD, ,depression, and prior CVA with no residual deficits, who presented to [facility] on 06/11/2021 with several days of dysphagia and difficulty ambulating. Patient had been experiencing symptoms since 6/8 after coming home from a dental appointment. He was brought to the hospital after a wellness check from his insurance company. In the ED, his Utox tested positive for cocaine, though patient denied use, and CT head scan showed findings c/w an acute right corona radiata infarct, along with multiple b/l subacute infarcts. Patient was managed medically, as he was not a candidate for either tPA or thrombectomy.    Patient was evaluated by PM&R and therapy for functional deficits and gait/ ADL impairments and recommended acute rehabilitation. Patient was medically optimized for discharge to [facility] on 06/21/2021. Patient was seen and examined at the bedside upon arrival.  (21 Jun 2021 15:04)      PAST MEDICAL & SURGICAL HISTORY:  Cerebrovascular accident (CVA)    Hypertension    Hyperlipidemia    Diabetes mellitus    Depression        Subjective:  No new complaints       VITALS  Vital Signs Last 24 Hrs  T(C): 36.6 (03 Jul 2021 08:08), Max: 36.6 (02 Jul 2021 20:07)  T(F): 97.9 (03 Jul 2021 08:08), Max: 97.9 (03 Jul 2021 08:08)  HR: 76 (03 Jul 2021 13:52) (69 - 85)  BP: 146/87 (03 Jul 2021 13:52) (138/80 - 155/93)  BP(mean): --  RR: 16 (03 Jul 2021 08:08) (15 - 16)  SpO2: 96% (03 Jul 2021 08:08) (94% - 98%)    REVIEW OF SYMPTOMS  Neurological deficits    PHYSICAL EXAM  Constitutional - NAD, Comfortable  HEENT - NCAT  Chest - CTAB   Cardio - warm and well perfused, RRR, no murmur  Abdomen -  Soft, NTND, (+) BS  Extremities - No peripheral edema, No calf tenderness   Neurologic Exam:                    Cognitive -             Orientation: AA O x4     Speech - (+) mild dysarthria,  No aphasia     Cranial Nerves - Right pupil sluggish, left pupil reactive, Visual fields slightly impaired on right, flattening of right NLF, EOMI, Shoulder shrug intact, +dysarthria,      Motor -                      LEFT    UE - ShAB 5/5, EF 5/5, EE 5/5, WE 5/5,  WNL                    RIGHT UE - ShAB 5/5, EF 5/5, EE 4/5, WE 5/5,  WNL                    LEFT    LE - HF 5/5, KE 5/5, DF 5/5, PF 5/5                    RIGHT LE - 5/5        Sensory - diminished sensation in dorsum and plantar aspect of right foot     Coordination - FTN intact & HTS intact  Psychiatric - mood stable, appropriate    RECENT LABS    07-02    138  |  105  |  43<H>  ----------------------------<  124<H>  5.4<H>   |  28  |  2.58<H>    Ca    8.8      02 Jul 2021 06:30              RADIOLOGY/OTHER RESULTS      MEDICATIONS  (STANDING):  amLODIPine   Tablet 10 milliGRAM(s) Oral <User Schedule>  apixaban 2.5 milliGRAM(s) Oral every 12 hours  atorvastatin 80 milliGRAM(s) Oral at bedtime  citalopram 20 milliGRAM(s) Oral daily  dextrose 40% Gel 15 Gram(s) Oral once  dextrose 5%. 1000 milliLiter(s) (50 mL/Hr) IV Continuous <Continuous>  dextrose 5%. 1000 milliLiter(s) (100 mL/Hr) IV Continuous <Continuous>  dextrose 50% Injectable 25 Gram(s) IV Push once  dextrose 50% Injectable 12.5 Gram(s) IV Push once  dextrose 50% Injectable 25 Gram(s) IV Push once  gabapentin 600 milliGRAM(s) Oral at bedtime  glucagon  Injectable 1 milliGRAM(s) IntraMuscular once  hydrALAZINE 25 milliGRAM(s) Oral every 8 hours  insulin glargine Injectable (LANTUS) 8 Unit(s) SubCutaneous at bedtime  insulin lispro (ADMELOG) corrective regimen sliding scale   SubCutaneous three times a day before meals  insulin lispro (ADMELOG) corrective regimen sliding scale   SubCutaneous at bedtime  labetalol 50 milliGRAM(s) Oral two times a day  nicotine - 21 mG/24Hr(s) Patch 1 patch Transdermal daily  polyethylene glycol 3350 17 Gram(s) Oral daily  senna 2 Tablet(s) Oral at bedtime  traZODone 50 milliGRAM(s) Oral at bedtime    MEDICATIONS  (PRN):  acetaminophen    Suspension .. 650 milliGRAM(s) Oral every 6 hours PRN Temp greater or equal to 38C (100.4F), Mild Pain (1 - 3)  bisacodyl 5 milliGRAM(s) Oral every 12 hours PRN Constipation

## 2021-07-04 LAB
ANION GAP SERPL CALC-SCNC: 7 MMOL/L — SIGNIFICANT CHANGE UP (ref 5–17)
BUN SERPL-MCNC: 45 MG/DL — HIGH (ref 7–23)
CALCIUM SERPL-MCNC: 8.9 MG/DL — SIGNIFICANT CHANGE UP (ref 8.4–10.5)
CHLORIDE SERPL-SCNC: 107 MMOL/L — SIGNIFICANT CHANGE UP (ref 96–108)
CO2 SERPL-SCNC: 26 MMOL/L — SIGNIFICANT CHANGE UP (ref 22–31)
CREAT SERPL-MCNC: 2.58 MG/DL — HIGH (ref 0.5–1.3)
GLUCOSE BLDC GLUCOMTR-MCNC: 124 MG/DL — HIGH (ref 70–99)
GLUCOSE BLDC GLUCOMTR-MCNC: 126 MG/DL — HIGH (ref 70–99)
GLUCOSE BLDC GLUCOMTR-MCNC: 156 MG/DL — HIGH (ref 70–99)
GLUCOSE BLDC GLUCOMTR-MCNC: 89 MG/DL — SIGNIFICANT CHANGE UP (ref 70–99)
GLUCOSE SERPL-MCNC: 93 MG/DL — SIGNIFICANT CHANGE UP (ref 70–99)
POTASSIUM SERPL-MCNC: 4.7 MMOL/L — SIGNIFICANT CHANGE UP (ref 3.5–5.3)
POTASSIUM SERPL-SCNC: 4.7 MMOL/L — SIGNIFICANT CHANGE UP (ref 3.5–5.3)
SODIUM SERPL-SCNC: 140 MMOL/L — SIGNIFICANT CHANGE UP (ref 135–145)

## 2021-07-04 PROCEDURE — 99232 SBSQ HOSP IP/OBS MODERATE 35: CPT

## 2021-07-04 RX ADMIN — Medication 1 PATCH: at 11:32

## 2021-07-04 RX ADMIN — Medication 50 MILLIGRAM(S): at 17:49

## 2021-07-04 RX ADMIN — GABAPENTIN 600 MILLIGRAM(S): 400 CAPSULE ORAL at 21:20

## 2021-07-04 RX ADMIN — INSULIN GLARGINE 8 UNIT(S): 100 INJECTION, SOLUTION SUBCUTANEOUS at 21:20

## 2021-07-04 RX ADMIN — ATORVASTATIN CALCIUM 80 MILLIGRAM(S): 80 TABLET, FILM COATED ORAL at 21:21

## 2021-07-04 RX ADMIN — Medication 1 PATCH: at 11:34

## 2021-07-04 RX ADMIN — Medication 1 PATCH: at 08:15

## 2021-07-04 RX ADMIN — Medication 1 PATCH: at 19:53

## 2021-07-04 RX ADMIN — Medication 50 MILLIGRAM(S): at 06:13

## 2021-07-04 RX ADMIN — APIXABAN 2.5 MILLIGRAM(S): 2.5 TABLET, FILM COATED ORAL at 17:49

## 2021-07-04 RX ADMIN — Medication 25 MILLIGRAM(S): at 06:13

## 2021-07-04 RX ADMIN — Medication 25 MILLIGRAM(S): at 21:21

## 2021-07-04 RX ADMIN — Medication 25 MILLIGRAM(S): at 14:40

## 2021-07-04 RX ADMIN — Medication 50 MILLIGRAM(S): at 21:21

## 2021-07-04 RX ADMIN — POLYETHYLENE GLYCOL 3350 17 GRAM(S): 17 POWDER, FOR SOLUTION ORAL at 11:32

## 2021-07-04 RX ADMIN — APIXABAN 2.5 MILLIGRAM(S): 2.5 TABLET, FILM COATED ORAL at 06:13

## 2021-07-04 RX ADMIN — AMLODIPINE BESYLATE 10 MILLIGRAM(S): 2.5 TABLET ORAL at 17:49

## 2021-07-04 RX ADMIN — CITALOPRAM 20 MILLIGRAM(S): 10 TABLET, FILM COATED ORAL at 11:32

## 2021-07-04 NOTE — PROGRESS NOTE ADULT - ASSESSMENT
MARK ANTHONY QUINTANA is a 55M with PMHx of HTN, DM, HLD, ,depression, and prior CVA with no residual deifcits, who presented to [facility] on 06/11/2021 with dysphagia and difficulty ambulating, & dysarthria found to have an acute right corona radiata infarct. Admitted for multidisciplinary rehab program.      #Comprehensive Multidisciplinary Rehab Program:  - Gait, ADL, Functional impairments  - PT/OT/ SLP 3 hours a day 5 days a week  -recreation therapy     #Acute corona radiata infarct and multiple b/l subacute infarcts  - distribution of b/l subacute infarcts suggestive of cardioembolic origin  - not a candidate for tPA or thrombectomy  - c/w Eliquis 2.5mg q12h and atorvastatin 80mg qhs    #HLD  - c/w atorvastatin    #HTN  -- BP mostly controlled-- cont. current regimen  - changed amlodipine 10mg to evening and cont. labetalol  50mg q12h  - cont. hydralazine  25mg TID -- increase to 50mg if needed  --hospitalist following      #CKD  -  Cr stable      #DM  - FS and ISS  -Lantus 8 units  -- Adjustment as per hospitalist  - DC home on Metformin 500mg BID, Glipizide 5mg daily, and Sitagliptin 50mg daily    #Neuropathy  - c/w gabapentin 600mg qhs    #Tobacco use  - Nicotine 21mg/24hr    #Mood/Depression  - c/w Citalopram 20mg daily    #Sleep:  - Maintain quiet hours and low stim environment;  --nursing order to turn off TV at 9PM to encourage proper sleep hygiene  - cont.  trazodone    #Pain:  - Tylenol PRN  - avoid sedating meds that may affect cognitive recovery    #GI/Bowel:  - Senna 2 tabs qhs and Miralax PRN  - PO Dulcolax PRN    #/Bladder:  - continent      #Skin/ Pressure Injury Prevention:  - assessment on admission   - Turn Q2hrs in bed while awake, OOB to Chair, PT/OT/SLP     #DVT prophylaxis:  - Eliquis  - SCDs  - neg for b/l LE DVTs at [facility]    #Precautions/ Restrictions  - Falls  - Lungs: Aspiration,   - COVID PCR: neg 6/11    #Dispo: IDR 06/24/2021  - OT: setup/supervision eating/grooming/UBD; min assist toilet transfer/LBD/shower; CG bathing  - PT: CG/min assist transfer; min assist ambulation 70ft with RW; min assist 8 steps with 2 hand rails  - SLP: mod-severe cog deficits--impaired PS, needs assist with money management and meds, impaired memory, +hs insight into deficits; (+) dysarthria; reg/thins  - Goals: Oralia transfers, supervision ambulation 150', supervision 12 steps, oralia eating/grooming/UBD, supervision LBD/bathing/functional transfers--Therapists to clarify if pt. can go home with Intermittent Supervision.  SW has had difficulty getting in touch with pt's [facility] program to extend HHA hours.  Will f/u  with niece regarding if family can provide supervision   - TDD: DC home vs KAYLENE 7/9    #LABs  - CBC BMP 6/28    --------------------------------------------  Outpatient Follow up:  Neurology - Dr. Nena Dumont on 07/22/2021 @ 11AM via telehealth  PCP  ----------------------------------------

## 2021-07-04 NOTE — PROGRESS NOTE ADULT - ASSESSMENT
CVA  Eliquis/Lipitor  PT/OT per rehab    JOSE  Cr stable  Hyperkalemia resolved     HTN  Norvasc, Labetalol, Hydralazine    HLD  lipitor    DM2,  A1C 5.9  Lantus/Lispro    Neuropathy  Gabapentin    Dep  Celexa

## 2021-07-04 NOTE — PROGRESS NOTE ADULT - SUBJECTIVE AND OBJECTIVE BOX
HPI:  MARK ANTHONY QUINTANA is a 55M with PMHx of HTN, DM, HLD, ,depression, and prior CVA with no residual deficits, who presented to [facility] on 06/11/2021 with several days of dysphagia and difficulty ambulating. Patient had been experiencing symptoms since 6/8 after coming home from a dental appointment. He was brought to the hospital after a wellness check from his insurance company. In the ED, his Utox tested positive for cocaine, though patient denied use, and CT head scan showed findings c/w an acute right corona radiata infarct, along with multiple b/l subacute infarcts. Patient was managed medically, as he was not a candidate for either tPA or thrombectomy.    Patient was evaluated by PM&R and therapy for functional deficits and gait/ ADL impairments and recommended acute rehabilitation. Patient was medically optimized for discharge to [facility] on 06/21/2021. Patient was seen and examined at the bedside upon arrival.  (21 Jun 2021 15:04)      Subjective    No new complaints.       PAST MEDICAL & SURGICAL HISTORY:  Cerebrovascular accident (CVA)    Hypertension    Hyperlipidemia    Diabetes mellitus    Depression        MedsMEDICATIONS  (STANDING):  amLODIPine   Tablet 10 milliGRAM(s) Oral <User Schedule>  apixaban 2.5 milliGRAM(s) Oral every 12 hours  atorvastatin 80 milliGRAM(s) Oral at bedtime  citalopram 20 milliGRAM(s) Oral daily  dextrose 40% Gel 15 Gram(s) Oral once  dextrose 5%. 1000 milliLiter(s) (50 mL/Hr) IV Continuous <Continuous>  dextrose 5%. 1000 milliLiter(s) (100 mL/Hr) IV Continuous <Continuous>  dextrose 50% Injectable 25 Gram(s) IV Push once  dextrose 50% Injectable 12.5 Gram(s) IV Push once  dextrose 50% Injectable 25 Gram(s) IV Push once  gabapentin 600 milliGRAM(s) Oral at bedtime  glucagon  Injectable 1 milliGRAM(s) IntraMuscular once  hydrALAZINE 25 milliGRAM(s) Oral every 8 hours  insulin glargine Injectable (LANTUS) 8 Unit(s) SubCutaneous at bedtime  insulin lispro (ADMELOG) corrective regimen sliding scale   SubCutaneous three times a day before meals  insulin lispro (ADMELOG) corrective regimen sliding scale   SubCutaneous at bedtime  labetalol 50 milliGRAM(s) Oral two times a day  nicotine - 21 mG/24Hr(s) Patch 1 patch Transdermal daily  polyethylene glycol 3350 17 Gram(s) Oral daily  senna 2 Tablet(s) Oral at bedtime  traZODone 50 milliGRAM(s) Oral at bedtime    MEDICATIONS  (PRN):  acetaminophen    Suspension .. 650 milliGRAM(s) Oral every 6 hours PRN Temp greater or equal to 38C (100.4F), Mild Pain (1 - 3)  bisacodyl 5 milliGRAM(s) Oral every 12 hours PRN Constipation      Vital Signs Last 24 Hrs  T(C): 36.7 (03 Jul 2021 21:25), Max: 36.7 (03 Jul 2021 21:25)  T(F): 98 (03 Jul 2021 21:25), Max: 98 (03 Jul 2021 21:25)  HR: 74 (04 Jul 2021 06:11) (72 - 76)  BP: 149/92 (04 Jul 2021 06:11) (146/87 - 167/98)  BP(mean): --  RR: 15 (03 Jul 2021 21:25) (15 - 15)  SpO2: 94% (03 Jul 2021 21:25) (94% - 94%)  I&O's Summary    03 Jul 2021 07:01  -  04 Jul 2021 07:00  --------------------------------------------------------  IN: 1428 mL / OUT: 2125 mL / NET: -697 mL        PHYSICAL EXAM:  GENERAL: NAD  NECK: Supple  NERVOUS SYSTEM:  awake and alert  HEART: S1s2 NL , RRR  CHEST/LUNG: Clear to percussion bilaterally  ABDOMEN: Soft, Nontender, Nondistended; Bowel sounds present  EXTREMITIES:  No edema      LABS:  07-04    140  |  107  |  45<H>  ----------------------------<  93  4.7   |  26  |  2.58<H>    Ca    8.9      04 Jul 2021 05:30            RVP:          Tox:           CAPILLARY BLOOD GLUCOSE      POCT Blood Glucose.: 89 mg/dL (04 Jul 2021 08:05)  POCT Blood Glucose.: 167 mg/dL (03 Jul 2021 21:21)  POCT Blood Glucose.: 128 mg/dL (03 Jul 2021 17:03)  POCT Blood Glucose.: 179 mg/dL (03 Jul 2021 11:33)      Imaging Personally Reviewed:  [ ] YES  [ ] NO        Care Discussed with Consultants/Other Providers [ x] YES  [ ] NO

## 2021-07-04 NOTE — PROGRESS NOTE ADULT - SUBJECTIVE AND OBJECTIVE BOX
HPI:  MARK ANTHONY QUINTANA is a 55M with PMHx of HTN, DM, HLD, ,depression, and prior CVA with no residual deficits, who presented to [facility] on 06/11/2021 with several days of dysphagia and difficulty ambulating. Patient had been experiencing symptoms since 6/8 after coming home from a dental appointment. He was brought to the hospital after a wellness check from his insurance company. In the ED, his Utox tested positive for cocaine, though patient denied use, and CT head scan showed findings c/w an acute right corona radiata infarct, along with multiple b/l subacute infarcts. Patient was managed medically, as he was not a candidate for either tPA or thrombectomy.    Patient was evaluated by PM&R and therapy for functional deficits and gait/ ADL impairments and recommended acute rehabilitation. Patient was medically optimized for discharge to [facility] on 06/21/2021. Patient was seen and examined at the bedside upon arrival.  (21 Jun 2021 15:04)      PAST MEDICAL & SURGICAL HISTORY:  Cerebrovascular accident (CVA)    Hypertension    Hyperlipidemia    Diabetes mellitus    Depression        Subjective:  No new complaints      VITALS  Vital Signs Last 24 Hrs  T(C): 36.7 (03 Jul 2021 21:25), Max: 36.7 (03 Jul 2021 21:25)  T(F): 98 (03 Jul 2021 21:25), Max: 98 (03 Jul 2021 21:25)  HR: 74 (04 Jul 2021 06:11) (72 - 76)  BP: 149/92 (04 Jul 2021 06:11) (146/87 - 167/98)  BP(mean): --  RR: 15 (03 Jul 2021 21:25) (15 - 15)  SpO2: 94% (03 Jul 2021 21:25) (94% - 94%)    REVIEW OF SYMPTOMS  Neurological deficits    PHYSICAL EXAM  Constitutional - NAD, Comfortable  HEENT - NCAT  Chest - CTAB   Cardio - warm and well perfused, RRR, no murmur  Abdomen -  Soft, NTND, (+) BS  Extremities - No peripheral edema, No calf tenderness   Neurologic Exam:                    Cognitive -             Orientation: AA O x4     Speech - (+) mild dysarthria,  No aphasia     Cranial Nerves - Right pupil sluggish, left pupil reactive, Visual fields slightly impaired on right, flattening of right NLF, EOMI, Shoulder shrug intact, +dysarthria,      Motor -                      LEFT    UE - ShAB 5/5, EF 5/5, EE 5/5, WE 5/5,  WNL                    RIGHT UE - ShAB 5/5, EF 5/5, EE 4/5, WE 5/5,  WNL                    LEFT    LE - HF 5/5, KE 5/5, DF 5/5, PF 5/5                    RIGHT LE - 5/5        Sensory - diminished sensation in dorsum and plantar aspect of right foot     Coordination - FTN intact & HTS intact  Psychiatric - mood stable, appropriate  RECENT LABS    07-04    140  |  107  |  45<H>  ----------------------------<  93  4.7   |  26  |  2.58<H>    Ca    8.9      04 Jul 2021 05:30              RADIOLOGY/OTHER RESULTS      MEDICATIONS  (STANDING):  amLODIPine   Tablet 10 milliGRAM(s) Oral <User Schedule>  apixaban 2.5 milliGRAM(s) Oral every 12 hours  atorvastatin 80 milliGRAM(s) Oral at bedtime  citalopram 20 milliGRAM(s) Oral daily  dextrose 40% Gel 15 Gram(s) Oral once  dextrose 5%. 1000 milliLiter(s) (50 mL/Hr) IV Continuous <Continuous>  dextrose 5%. 1000 milliLiter(s) (100 mL/Hr) IV Continuous <Continuous>  dextrose 50% Injectable 25 Gram(s) IV Push once  dextrose 50% Injectable 12.5 Gram(s) IV Push once  dextrose 50% Injectable 25 Gram(s) IV Push once  gabapentin 600 milliGRAM(s) Oral at bedtime  glucagon  Injectable 1 milliGRAM(s) IntraMuscular once  hydrALAZINE 25 milliGRAM(s) Oral every 8 hours  insulin glargine Injectable (LANTUS) 8 Unit(s) SubCutaneous at bedtime  insulin lispro (ADMELOG) corrective regimen sliding scale   SubCutaneous three times a day before meals  insulin lispro (ADMELOG) corrective regimen sliding scale   SubCutaneous at bedtime  labetalol 50 milliGRAM(s) Oral two times a day  nicotine - 21 mG/24Hr(s) Patch 1 patch Transdermal daily  polyethylene glycol 3350 17 Gram(s) Oral daily  senna 2 Tablet(s) Oral at bedtime  traZODone 50 milliGRAM(s) Oral at bedtime    MEDICATIONS  (PRN):  acetaminophen    Suspension .. 650 milliGRAM(s) Oral every 6 hours PRN Temp greater or equal to 38C (100.4F), Mild Pain (1 - 3)  bisacodyl 5 milliGRAM(s) Oral every 12 hours PRN Constipation

## 2021-07-05 LAB
ALBUMIN SERPL ELPH-MCNC: 2.5 G/DL — LOW (ref 3.3–5)
ALP SERPL-CCNC: 152 U/L — HIGH (ref 40–120)
ALT FLD-CCNC: 41 U/L — SIGNIFICANT CHANGE UP (ref 10–45)
ANION GAP SERPL CALC-SCNC: 7 MMOL/L — SIGNIFICANT CHANGE UP (ref 5–17)
AST SERPL-CCNC: 39 U/L — SIGNIFICANT CHANGE UP (ref 10–40)
BILIRUB DIRECT SERPL-MCNC: 0.1 MG/DL — SIGNIFICANT CHANGE UP (ref 0–0.2)
BILIRUB INDIRECT FLD-MCNC: 0.3 MG/DL — SIGNIFICANT CHANGE UP (ref 0.2–1)
BILIRUB SERPL-MCNC: 0.4 MG/DL — SIGNIFICANT CHANGE UP (ref 0.2–1.2)
BUN SERPL-MCNC: 49 MG/DL — HIGH (ref 7–23)
CALCIUM SERPL-MCNC: 8.8 MG/DL — SIGNIFICANT CHANGE UP (ref 8.4–10.5)
CHLORIDE SERPL-SCNC: 106 MMOL/L — SIGNIFICANT CHANGE UP (ref 96–108)
CO2 SERPL-SCNC: 25 MMOL/L — SIGNIFICANT CHANGE UP (ref 22–31)
CREAT SERPL-MCNC: 2.72 MG/DL — HIGH (ref 0.5–1.3)
GLUCOSE BLDC GLUCOMTR-MCNC: 119 MG/DL — HIGH (ref 70–99)
GLUCOSE BLDC GLUCOMTR-MCNC: 121 MG/DL — HIGH (ref 70–99)
GLUCOSE BLDC GLUCOMTR-MCNC: 124 MG/DL — HIGH (ref 70–99)
GLUCOSE BLDC GLUCOMTR-MCNC: 184 MG/DL — HIGH (ref 70–99)
GLUCOSE SERPL-MCNC: 124 MG/DL — HIGH (ref 70–99)
HCT VFR BLD CALC: 34.9 % — LOW (ref 39–50)
HGB BLD-MCNC: 11.5 G/DL — LOW (ref 13–17)
MCHC RBC-ENTMCNC: 29.6 PG — SIGNIFICANT CHANGE UP (ref 27–34)
MCHC RBC-ENTMCNC: 33 GM/DL — SIGNIFICANT CHANGE UP (ref 32–36)
MCV RBC AUTO: 89.9 FL — SIGNIFICANT CHANGE UP (ref 80–100)
NRBC # BLD: 0 /100 WBCS — SIGNIFICANT CHANGE UP (ref 0–0)
PLATELET # BLD AUTO: 285 K/UL — SIGNIFICANT CHANGE UP (ref 150–400)
POTASSIUM SERPL-MCNC: 4.6 MMOL/L — SIGNIFICANT CHANGE UP (ref 3.5–5.3)
POTASSIUM SERPL-SCNC: 4.6 MMOL/L — SIGNIFICANT CHANGE UP (ref 3.5–5.3)
PROT SERPL-MCNC: 6.5 G/DL — SIGNIFICANT CHANGE UP (ref 6–8.3)
RBC # BLD: 3.88 M/UL — LOW (ref 4.2–5.8)
RBC # FLD: 11 % — SIGNIFICANT CHANGE UP (ref 10.3–14.5)
SODIUM SERPL-SCNC: 138 MMOL/L — SIGNIFICANT CHANGE UP (ref 135–145)
WBC # BLD: 4.84 K/UL — SIGNIFICANT CHANGE UP (ref 3.8–10.5)
WBC # FLD AUTO: 4.84 K/UL — SIGNIFICANT CHANGE UP (ref 3.8–10.5)

## 2021-07-05 PROCEDURE — 99233 SBSQ HOSP IP/OBS HIGH 50: CPT

## 2021-07-05 PROCEDURE — 99232 SBSQ HOSP IP/OBS MODERATE 35: CPT

## 2021-07-05 RX ORDER — LABETALOL HCL 100 MG
100 TABLET ORAL
Refills: 0 | Status: DISCONTINUED | OUTPATIENT
Start: 2021-07-05 | End: 2021-07-21

## 2021-07-05 RX ADMIN — Medication 1: at 12:16

## 2021-07-05 RX ADMIN — APIXABAN 2.5 MILLIGRAM(S): 2.5 TABLET, FILM COATED ORAL at 17:53

## 2021-07-05 RX ADMIN — CITALOPRAM 20 MILLIGRAM(S): 10 TABLET, FILM COATED ORAL at 12:16

## 2021-07-05 RX ADMIN — Medication 25 MILLIGRAM(S): at 05:18

## 2021-07-05 RX ADMIN — Medication 1 PATCH: at 19:49

## 2021-07-05 RX ADMIN — GABAPENTIN 600 MILLIGRAM(S): 400 CAPSULE ORAL at 21:06

## 2021-07-05 RX ADMIN — Medication 25 MILLIGRAM(S): at 21:06

## 2021-07-05 RX ADMIN — Medication 25 MILLIGRAM(S): at 14:48

## 2021-07-05 RX ADMIN — Medication 50 MILLIGRAM(S): at 21:06

## 2021-07-05 RX ADMIN — Medication 50 MILLIGRAM(S): at 05:18

## 2021-07-05 RX ADMIN — Medication 1 PATCH: at 12:15

## 2021-07-05 RX ADMIN — AMLODIPINE BESYLATE 10 MILLIGRAM(S): 2.5 TABLET ORAL at 17:53

## 2021-07-05 RX ADMIN — Medication 100 MILLIGRAM(S): at 17:53

## 2021-07-05 RX ADMIN — APIXABAN 2.5 MILLIGRAM(S): 2.5 TABLET, FILM COATED ORAL at 05:18

## 2021-07-05 RX ADMIN — Medication 1 PATCH: at 07:23

## 2021-07-05 RX ADMIN — INSULIN GLARGINE 8 UNIT(S): 100 INJECTION, SOLUTION SUBCUTANEOUS at 21:05

## 2021-07-05 RX ADMIN — ATORVASTATIN CALCIUM 80 MILLIGRAM(S): 80 TABLET, FILM COATED ORAL at 21:06

## 2021-07-05 NOTE — PROGRESS NOTE ADULT - ASSESSMENT
MARK ANTHONY QUINTANA is a 55M with PMHx of HTN, DM, HLD, ,depression, and prior CVA with no residual deifcits, who presented to [facility] on 06/11/2021 with dysphagia and difficulty ambulating, & dysarthria found to have an acute right corona radiata infarct. Admitted for multidisciplinary rehab program.      #Comprehensive Multidisciplinary Rehab Program:  - Gait, ADL, Functional impairments  - PT/OT/ SLP 3 hours a day 5 days a week  -recreation therapy     #Acute corona radiata infarct and multiple b/l subacute infarcts  - distribution of b/l subacute infarcts suggestive of cardioembolic origin  - not a candidate for tPA or thrombectomy  - c/w Eliquis 2.5mg q12h and atorvastatin 80mg qhs    #HLD  - c/w atorvastatin    #HTN  -- BP elevated  --Labetalol increased to 100mg q12h  - cont. amlodipine 10mg in evening   - cont. hydralazine  25mg TID   --hospitalist following      #CKD  -  Cr increasing  -- nursing order to increase PO hydration  --f/u BMP in AM      #DM  - FS and ISS  -Lantus 8 units  -- Adjustment as per hospitalist  - DC home on Metformin 500mg BID, Glipizide 5mg daily, and Sitagliptin 50mg daily    #Neuropathy  - c/w gabapentin 600mg qhs    #Tobacco use  - Nicotine 21mg/24hr    #Mood/Depression  - c/w Citalopram 20mg daily    #Sleep:  - Maintain quiet hours and low stim environment;  --nursing order to turn off TV at 9PM to encourage proper sleep hygiene  - cont.  trazodone    #Pain:  - Tylenol PRN  - avoid sedating meds that may affect cognitive recovery    #GI/Bowel:  - Senna 2 tabs qhs and Miralax PRN  - PO Dulcolax PRN    #/Bladder:  - continent      #Skin/ Pressure Injury Prevention:  - assessment on admission   - Turn Q2hrs in bed while awake, OOB to Chair, PT/OT/SLP     #DVT prophylaxis:  - Eliquis  - SCDs  - neg for b/l LE DVTs at [facility]    #Precautions/ Restrictions  - Falls  - Lungs: Aspiration,   - COVID PCR: neg 6/11    #Dispo: IDR 06/24/2021  - OT: setup/supervision eating/grooming/UBD; min assist toilet transfer/LBD/shower; CG bathing  - PT: CG/min assist transfer; min assist ambulation 70ft with RW; min assist 8 steps with 2 hand rails  - SLP: mod-severe cog deficits--impaired PS, needs assist with money management and meds, impaired memory, +hs insight into deficits; (+) dysarthria; reg/thins  - Goals: Oralia transfers, supervision ambulation 150', supervision 12 steps, oralia eating/grooming/UBD, supervision LBD/bathing/functional transfers--Therapists to clarify if pt. can go home with Intermittent Supervision.  SW has had difficulty getting in touch with pt's MLTC program to extend HHA hours.  Will f/u  with niece regarding if family can provide supervision   - TDD: DC home vs KAYLENE 7/9    #LABs  - CBC BMP 6/28    --------------------------------------------  Outpatient Follow up:  Neurology - Dr. Nena Dumont on 07/22/2021 @ 11AM via telehealth  PCP  ----------------------------------------

## 2021-07-05 NOTE — PROGRESS NOTE ADULT - SUBJECTIVE AND OBJECTIVE BOX
HPI:  MARK ANTHONY QUINTANA is a 55M with PMHx of HTN, DM, HLD, ,depression, and prior CVA with no residual deficits, who presented to [facility] on 06/11/2021 with several days of dysphagia and difficulty ambulating. Patient had been experiencing symptoms since 6/8 after coming home from a dental appointment. He was brought to the hospital after a wellness check from his insurance company. In the ED, his Utox tested positive for cocaine, though patient denied use, and CT head scan showed findings c/w an acute right corona radiata infarct, along with multiple b/l subacute infarcts. Patient was managed medically, as he was not a candidate for either tPA or thrombectomy.    Patient was evaluated by PM&R and therapy for functional deficits and gait/ ADL impairments and recommended acute rehabilitation. Patient was medically optimized for discharge to [facility] on 06/21/2021. Patient was seen and examined at the bedside upon arrival.  (21 Jun 2021 15:04)      PAST MEDICAL & SURGICAL HISTORY:  Cerebrovascular accident (CVA)    Hypertension    Hyperlipidemia    Diabetes mellitus    Depression        Subjective:  pt. upset that he is not walking with a cane-- discussed addressing with his PT when he will be ready to walk with cane.  No other complaints      VITALS  Vital Signs Last 24 Hrs  T(C): 36.6 (04 Jul 2021 21:18), Max: 36.8 (04 Jul 2021 14:38)  T(F): 97.9 (04 Jul 2021 21:18), Max: 98.3 (04 Jul 2021 14:38)  HR: 73 (05 Jul 2021 05:16) (72 - 73)  BP: 155/83 (05 Jul 2021 05:16) (144/94 - 172/99)  BP(mean): --  RR: 16 (04 Jul 2021 21:18) (15 - 16)  SpO2: 98% (04 Jul 2021 21:18) (98% - 98%)    REVIEW OF SYMPTOMS  Neurological deficits    PHYSICAL EXAM  Constitutional - NAD, Comfortable  HEENT - NCAT  Chest - breathing comfortably on RA  Cardio - warm and well perfused,   Abdomen -  Soft, NTND,   Extremities - No peripheral edema, No calf tenderness   Neurologic Exam:                    Cognitive -             Orientation: AA O x4     Speech - (+) mild dysarthria,  No aphasia     Cranial Nerves - Right pupil sluggish, left pupil reactive, Visual fields slightly impaired on right, flattening of right NLF, EOMI, Shoulder shrug intact, +dysarthria,      Motor -                      LEFT    UE - ShAB 5/5, EF 5/5, EE 5/5, WE 5/5,  WNL                    RIGHT UE - ShAB 5/5, EF 5/5, EE 4/5, WE 5/5,  WNL                    LEFT    LE - HF 5/5, KE 5/5, DF 5/5, PF 5/5                    RIGHT LE - 5/5        Sensory - diminished sensation in dorsum and plantar aspect of right foot     Coordination - FTN intact & HTS intact  Psychiatric - mood stable, appropriate    RECENT LABS                        11.5   4.84  )-----------( 285      ( 05 Jul 2021 06:40 )             34.9     07-05    138  |  106  |  49<H>  ----------------------------<  124<H>  4.6   |  25  |  2.72<H>    Ca    8.8      05 Jul 2021 06:40    TPro  6.5  /  Alb  2.5<L>  /  TBili  0.4  /  DBili  0.1  /  AST  39  /  ALT  41  /  AlkPhos  152<H>  07-05            RADIOLOGY/OTHER RESULTS      MEDICATIONS  (STANDING):  amLODIPine   Tablet 10 milliGRAM(s) Oral <User Schedule>  apixaban 2.5 milliGRAM(s) Oral every 12 hours  atorvastatin 80 milliGRAM(s) Oral at bedtime  citalopram 20 milliGRAM(s) Oral daily  dextrose 40% Gel 15 Gram(s) Oral once  dextrose 5%. 1000 milliLiter(s) (50 mL/Hr) IV Continuous <Continuous>  dextrose 5%. 1000 milliLiter(s) (100 mL/Hr) IV Continuous <Continuous>  dextrose 50% Injectable 25 Gram(s) IV Push once  dextrose 50% Injectable 12.5 Gram(s) IV Push once  dextrose 50% Injectable 25 Gram(s) IV Push once  gabapentin 600 milliGRAM(s) Oral at bedtime  glucagon  Injectable 1 milliGRAM(s) IntraMuscular once  hydrALAZINE 25 milliGRAM(s) Oral every 8 hours  insulin glargine Injectable (LANTUS) 8 Unit(s) SubCutaneous at bedtime  insulin lispro (ADMELOG) corrective regimen sliding scale   SubCutaneous three times a day before meals  insulin lispro (ADMELOG) corrective regimen sliding scale   SubCutaneous at bedtime  labetalol 100 milliGRAM(s) Oral two times a day  nicotine - 21 mG/24Hr(s) Patch 1 patch Transdermal daily  polyethylene glycol 3350 17 Gram(s) Oral daily  senna 2 Tablet(s) Oral at bedtime  traZODone 50 milliGRAM(s) Oral at bedtime    MEDICATIONS  (PRN):  acetaminophen    Suspension .. 650 milliGRAM(s) Oral every 6 hours PRN Temp greater or equal to 38C (100.4F), Mild Pain (1 - 3)  bisacodyl 5 milliGRAM(s) Oral every 12 hours PRN Constipation

## 2021-07-05 NOTE — PROGRESS NOTE ADULT - SUBJECTIVE AND OBJECTIVE BOX
HPI:  MARK ANTHONY QUINTANA is a 55M with PMHx of HTN, DM, HLD, ,depression, and prior CVA with no residual deficits, who presented to [facility] on 06/11/2021 with several days of dysphagia and difficulty ambulating. Patient had been experiencing symptoms since 6/8 after coming home from a dental appointment. He was brought to the hospital after a wellness check from his insurance company. In the ED, his Utox tested positive for cocaine, though patient denied use, and CT head scan showed findings c/w an acute right corona radiata infarct, along with multiple b/l subacute infarcts. Patient was managed medically, as he was not a candidate for either tPA or thrombectomy.    Patient was evaluated by PM&R and therapy for functional deficits and gait/ ADL impairments and recommended acute rehabilitation. Patient was medically optimized for discharge to [facility] on 06/21/2021. Patient was seen and examined at the bedside upon arrival.  (21 Jun 2021 15:04)      Subjective    No new complaints.       PAST MEDICAL & SURGICAL HISTORY:  Cerebrovascular accident (CVA)    Hypertension    Hyperlipidemia    Diabetes mellitus    Depression        MedsMEDICATIONS  (STANDING):  amLODIPine   Tablet 10 milliGRAM(s) Oral <User Schedule>  apixaban 2.5 milliGRAM(s) Oral every 12 hours  atorvastatin 80 milliGRAM(s) Oral at bedtime  citalopram 20 milliGRAM(s) Oral daily  dextrose 40% Gel 15 Gram(s) Oral once  dextrose 5%. 1000 milliLiter(s) (50 mL/Hr) IV Continuous <Continuous>  dextrose 5%. 1000 milliLiter(s) (100 mL/Hr) IV Continuous <Continuous>  dextrose 50% Injectable 25 Gram(s) IV Push once  dextrose 50% Injectable 12.5 Gram(s) IV Push once  dextrose 50% Injectable 25 Gram(s) IV Push once  gabapentin 600 milliGRAM(s) Oral at bedtime  glucagon  Injectable 1 milliGRAM(s) IntraMuscular once  hydrALAZINE 25 milliGRAM(s) Oral every 8 hours  insulin glargine Injectable (LANTUS) 8 Unit(s) SubCutaneous at bedtime  insulin lispro (ADMELOG) corrective regimen sliding scale   SubCutaneous three times a day before meals  insulin lispro (ADMELOG) corrective regimen sliding scale   SubCutaneous at bedtime  labetalol 50 milliGRAM(s) Oral two times a day  nicotine - 21 mG/24Hr(s) Patch 1 patch Transdermal daily  polyethylene glycol 3350 17 Gram(s) Oral daily  senna 2 Tablet(s) Oral at bedtime  traZODone 50 milliGRAM(s) Oral at bedtime    MEDICATIONS  (PRN):  acetaminophen    Suspension .. 650 milliGRAM(s) Oral every 6 hours PRN Temp greater or equal to 38C (100.4F), Mild Pain (1 - 3)  bisacodyl 5 milliGRAM(s) Oral every 12 hours PRN Constipation      Vital Signs Last 24 Hrs  T(C): 36.6 (04 Jul 2021 21:18), Max: 36.8 (04 Jul 2021 14:38)  T(F): 97.9 (04 Jul 2021 21:18), Max: 98.3 (04 Jul 2021 14:38)  HR: 73 (05 Jul 2021 05:16) (72 - 73)  BP: 155/83 (05 Jul 2021 05:16) (144/94 - 172/99)  BP(mean): --  RR: 16 (04 Jul 2021 21:18) (15 - 16)  SpO2: 98% (04 Jul 2021 21:18) (98% - 98%)  I&O's Summary    04 Jul 2021 07:01  -  05 Jul 2021 07:00  --------------------------------------------------------  IN: 711 mL / OUT: 650 mL / NET: 61 mL        PHYSICAL EXAM:  GENERAL: NAD  NECK: Supple  NERVOUS SYSTEM:  awake and alert  HEART: S1s2 NL , RRR  CHEST/LUNG: Clear to percussion bilaterally  ABDOMEN: Soft, Nontender, Nondistended; Bowel sounds present  EXTREMITIES:  No edema      LABS:(07-05 @ 06:40)                      11.5  4.84 )-----------( 285                 34.9    Neutrophils = -- (--%)  Lymphocytes = -- (--%)  Eosinophils = -- (--%)  Basophils = -- (--%)  Monocytes = -- (--%)  Bands = --%    07-05    138  |  106  |  49<H>  ----------------------------<  124<H>  4.6   |  25  |  2.72<H>    Ca    8.8      05 Jul 2021 06:40    TPro  6.5  /  Alb  2.5<L>  /  TBili  0.4  /  DBili  0.1  /  AST  39  /  ALT  41  /  AlkPhos  152<H>  07-05          RVP:          Tox:           CAPILLARY BLOOD GLUCOSE      POCT Blood Glucose.: 119 mg/dL (05 Jul 2021 07:24)  POCT Blood Glucose.: 156 mg/dL (04 Jul 2021 21:16)  POCT Blood Glucose.: 124 mg/dL (04 Jul 2021 16:49)  POCT Blood Glucose.: 126 mg/dL (04 Jul 2021 11:30)      Imaging Personally Reviewed:  [ ] YES  [ ] NO        Care Discussed with Consultants/Other Providers [ x] YES  [ ] NO

## 2021-07-05 NOTE — PROGRESS NOTE ADULT - ASSESSMENT
CVA  Eliquis/Lipitor  PT/OT per rehab    JOSE  Cr increasing  Encourage PO fluid/control BP  Hyperkalemia resolved     HTN  Norvasc, Labetalol(inc form 50 to 100 BID), Hydralazine    HLD  lipitor    DM2,  A1C 5.9  Lantus/Lispro    Neuropathy  Gabapentin    Dep  Celexa

## 2021-07-06 LAB
ANION GAP SERPL CALC-SCNC: 7 MMOL/L — SIGNIFICANT CHANGE UP (ref 5–17)
BUN SERPL-MCNC: 59 MG/DL — HIGH (ref 7–23)
CALCIUM SERPL-MCNC: 9 MG/DL — SIGNIFICANT CHANGE UP (ref 8.4–10.5)
CHLORIDE SERPL-SCNC: 105 MMOL/L — SIGNIFICANT CHANGE UP (ref 96–108)
CO2 SERPL-SCNC: 26 MMOL/L — SIGNIFICANT CHANGE UP (ref 22–31)
CREAT SERPL-MCNC: 2.82 MG/DL — HIGH (ref 0.5–1.3)
GLUCOSE BLDC GLUCOMTR-MCNC: 139 MG/DL — HIGH (ref 70–99)
GLUCOSE BLDC GLUCOMTR-MCNC: 162 MG/DL — HIGH (ref 70–99)
GLUCOSE BLDC GLUCOMTR-MCNC: 199 MG/DL — HIGH (ref 70–99)
GLUCOSE BLDC GLUCOMTR-MCNC: 253 MG/DL — HIGH (ref 70–99)
GLUCOSE SERPL-MCNC: 127 MG/DL — HIGH (ref 70–99)
POTASSIUM SERPL-MCNC: 5.2 MMOL/L — SIGNIFICANT CHANGE UP (ref 3.5–5.3)
POTASSIUM SERPL-SCNC: 5.2 MMOL/L — SIGNIFICANT CHANGE UP (ref 3.5–5.3)
SODIUM SERPL-SCNC: 138 MMOL/L — SIGNIFICANT CHANGE UP (ref 135–145)

## 2021-07-06 PROCEDURE — 99233 SBSQ HOSP IP/OBS HIGH 50: CPT

## 2021-07-06 PROCEDURE — 99232 SBSQ HOSP IP/OBS MODERATE 35: CPT

## 2021-07-06 RX ORDER — SODIUM CHLORIDE 9 MG/ML
1000 INJECTION, SOLUTION INTRAVENOUS
Refills: 0 | Status: DISCONTINUED | OUTPATIENT
Start: 2021-07-06 | End: 2021-07-06

## 2021-07-06 RX ORDER — HYDRALAZINE HCL 50 MG
50 TABLET ORAL EVERY 8 HOURS
Refills: 0 | Status: DISCONTINUED | OUTPATIENT
Start: 2021-07-06 | End: 2021-07-07

## 2021-07-06 RX ADMIN — Medication 1 PATCH: at 07:26

## 2021-07-06 RX ADMIN — Medication 1 PATCH: at 11:13

## 2021-07-06 RX ADMIN — Medication 25 MILLIGRAM(S): at 14:10

## 2021-07-06 RX ADMIN — Medication 100 MILLIGRAM(S): at 17:26

## 2021-07-06 RX ADMIN — Medication 1: at 16:28

## 2021-07-06 RX ADMIN — AMLODIPINE BESYLATE 10 MILLIGRAM(S): 2.5 TABLET ORAL at 17:26

## 2021-07-06 RX ADMIN — APIXABAN 2.5 MILLIGRAM(S): 2.5 TABLET, FILM COATED ORAL at 17:26

## 2021-07-06 RX ADMIN — Medication 50 MILLIGRAM(S): at 21:10

## 2021-07-06 RX ADMIN — SENNA PLUS 2 TABLET(S): 8.6 TABLET ORAL at 21:10

## 2021-07-06 RX ADMIN — Medication 3: at 11:10

## 2021-07-06 RX ADMIN — GABAPENTIN 600 MILLIGRAM(S): 400 CAPSULE ORAL at 21:10

## 2021-07-06 RX ADMIN — APIXABAN 2.5 MILLIGRAM(S): 2.5 TABLET, FILM COATED ORAL at 06:09

## 2021-07-06 RX ADMIN — Medication 1 PATCH: at 19:50

## 2021-07-06 RX ADMIN — POLYETHYLENE GLYCOL 3350 17 GRAM(S): 17 POWDER, FOR SOLUTION ORAL at 11:13

## 2021-07-06 RX ADMIN — Medication 100 MILLIGRAM(S): at 06:09

## 2021-07-06 RX ADMIN — Medication 25 MILLIGRAM(S): at 06:09

## 2021-07-06 RX ADMIN — INSULIN GLARGINE 8 UNIT(S): 100 INJECTION, SOLUTION SUBCUTANEOUS at 21:08

## 2021-07-06 RX ADMIN — Medication 1 PATCH: at 11:15

## 2021-07-06 RX ADMIN — CITALOPRAM 20 MILLIGRAM(S): 10 TABLET, FILM COATED ORAL at 11:13

## 2021-07-06 RX ADMIN — ATORVASTATIN CALCIUM 80 MILLIGRAM(S): 80 TABLET, FILM COATED ORAL at 21:10

## 2021-07-06 NOTE — PROGRESS NOTE ADULT - SUBJECTIVE AND OBJECTIVE BOX
Medicine Progress Note    Patient is a 55y old  Male who presents with a chief complaint of Acute right corona radiata CVA (06 Jul 2021 11:33)      SUBJECTIVE / OVERNIGHT EVENTS:  seen and examined  Chart reviewed  No overnight events  Limb weakness improving with therapy  BM+  No pain  No complaints    creatinine worsening    ADDITIONAL REVIEW OF SYSTEMS:  no fever/chills/CP/sob/palpitation/dizziness/ abd pain/nausea/vomiting/diarrhea/constipation/headaches. all other ROS neg    MEDICATIONS  (STANDING):  amLODIPine   Tablet 10 milliGRAM(s) Oral <User Schedule>  apixaban 2.5 milliGRAM(s) Oral every 12 hours  atorvastatin 80 milliGRAM(s) Oral at bedtime  citalopram 20 milliGRAM(s) Oral daily  dextrose 40% Gel 15 Gram(s) Oral once  dextrose 5%. 1000 milliLiter(s) (50 mL/Hr) IV Continuous <Continuous>  dextrose 5%. 1000 milliLiter(s) (100 mL/Hr) IV Continuous <Continuous>  dextrose 50% Injectable 25 Gram(s) IV Push once  dextrose 50% Injectable 12.5 Gram(s) IV Push once  dextrose 50% Injectable 25 Gram(s) IV Push once  gabapentin 600 milliGRAM(s) Oral at bedtime  glucagon  Injectable 1 milliGRAM(s) IntraMuscular once  hydrALAZINE 25 milliGRAM(s) Oral every 8 hours  insulin glargine Injectable (LANTUS) 8 Unit(s) SubCutaneous at bedtime  insulin lispro (ADMELOG) corrective regimen sliding scale   SubCutaneous three times a day before meals  insulin lispro (ADMELOG) corrective regimen sliding scale   SubCutaneous at bedtime  labetalol 100 milliGRAM(s) Oral two times a day  nicotine - 21 mG/24Hr(s) Patch 1 patch Transdermal daily  polyethylene glycol 3350 17 Gram(s) Oral daily  senna 2 Tablet(s) Oral at bedtime  traZODone 50 milliGRAM(s) Oral at bedtime    MEDICATIONS  (PRN):  acetaminophen    Suspension .. 650 milliGRAM(s) Oral every 6 hours PRN Temp greater or equal to 38C (100.4F), Mild Pain (1 - 3)  bisacodyl 5 milliGRAM(s) Oral every 12 hours PRN Constipation    CAPILLARY BLOOD GLUCOSE      POCT Blood Glucose.: 253 mg/dL (06 Jul 2021 11:09)  POCT Blood Glucose.: 139 mg/dL (06 Jul 2021 07:24)  POCT Blood Glucose.: 121 mg/dL (05 Jul 2021 21:04)  POCT Blood Glucose.: 124 mg/dL (05 Jul 2021 16:53)    I&O's Summary    05 Jul 2021 07:01  -  06 Jul 2021 07:00  --------------------------------------------------------  IN: 711 mL / OUT: 1150 mL / NET: -439 mL        PHYSICAL EXAM:  Vital Signs Last 24 Hrs  T(C): 36.8 (06 Jul 2021 07:27), Max: 36.8 (05 Jul 2021 19:52)  T(F): 98.3 (06 Jul 2021 07:27), Max: 98.3 (05 Jul 2021 19:52)  HR: 67 (06 Jul 2021 07:27) (67 - 75)  BP: 150/88 (06 Jul 2021 07:27) (150/88 - 165/96)  BP(mean): --  RR: 14 (06 Jul 2021 07:27) (14 - 15)  SpO2: 99% (06 Jul 2021 07:27) (98% - 99%)    GENERAL: Not in distress. Alert    HEENT: clear conjuctiva, MMM. no pallor or icterus  CARDIOVASCULAR: RRR S1, S2. No murmur/rubs/gallop  LUNGS: BLAE+, no rales, no wheezing, no rhonchi.    ABDOMEN: ND. Soft,  NT, no guarding / rebound / rigidity. BS normoactive  BACK: No spine tenderness.  EXTREMITIES: no edema. no leg or calf TP.  SKIN: warm and dry  PSYCHIATRIC: Calm.  No agitation.    LABS:                        11.5   4.84  )-----------( 285      ( 05 Jul 2021 06:40 )             34.9     07-06    138  |  105  |  59<H>  ----------------------------<  127<H>  5.2   |  26  |  2.82<H>    Ca    9.0      06 Jul 2021 05:00    TPro  6.5  /  Alb  2.5<L>  /  TBili  0.4  /  DBili  0.1  /  AST  39  /  ALT  41  /  AlkPhos  152<H>  07-05              COVID-19 PCR: NotDetec (21 Jun 2021 23:30)      RADIOLOGY & ADDITIONAL TESTS:  Imaging from Last 24 Hours:    Electrocardiogram/QTc Interval:    COORDINATION OF CARE:  Care Discussed with Consultants/Other Providers:   Medicine Progress Note    Patient is a 55y old  Male who presents with a chief complaint of Acute right corona radiata CVA (06 Jul 2021 11:33)      SUBJECTIVE / OVERNIGHT EVENTS:  seen and examined  Chart reviewed  No overnight events  Limb weakness improving with therapy  BM+  No pain  No complaints    creatinine worsening  BP high    ADDITIONAL REVIEW OF SYSTEMS:  no fever/chills/CP/sob/palpitation/dizziness/ abd pain/nausea/vomiting/diarrhea/constipation/headaches. all other ROS neg    MEDICATIONS  (STANDING):  amLODIPine   Tablet 10 milliGRAM(s) Oral <User Schedule>  apixaban 2.5 milliGRAM(s) Oral every 12 hours  atorvastatin 80 milliGRAM(s) Oral at bedtime  citalopram 20 milliGRAM(s) Oral daily  dextrose 40% Gel 15 Gram(s) Oral once  dextrose 5%. 1000 milliLiter(s) (50 mL/Hr) IV Continuous <Continuous>  dextrose 5%. 1000 milliLiter(s) (100 mL/Hr) IV Continuous <Continuous>  dextrose 50% Injectable 25 Gram(s) IV Push once  dextrose 50% Injectable 12.5 Gram(s) IV Push once  dextrose 50% Injectable 25 Gram(s) IV Push once  gabapentin 600 milliGRAM(s) Oral at bedtime  glucagon  Injectable 1 milliGRAM(s) IntraMuscular once  hydrALAZINE 25 milliGRAM(s) Oral every 8 hours  insulin glargine Injectable (LANTUS) 8 Unit(s) SubCutaneous at bedtime  insulin lispro (ADMELOG) corrective regimen sliding scale   SubCutaneous three times a day before meals  insulin lispro (ADMELOG) corrective regimen sliding scale   SubCutaneous at bedtime  labetalol 100 milliGRAM(s) Oral two times a day  nicotine - 21 mG/24Hr(s) Patch 1 patch Transdermal daily  polyethylene glycol 3350 17 Gram(s) Oral daily  senna 2 Tablet(s) Oral at bedtime  traZODone 50 milliGRAM(s) Oral at bedtime    MEDICATIONS  (PRN):  acetaminophen    Suspension .. 650 milliGRAM(s) Oral every 6 hours PRN Temp greater or equal to 38C (100.4F), Mild Pain (1 - 3)  bisacodyl 5 milliGRAM(s) Oral every 12 hours PRN Constipation    CAPILLARY BLOOD GLUCOSE      POCT Blood Glucose.: 253 mg/dL (06 Jul 2021 11:09)  POCT Blood Glucose.: 139 mg/dL (06 Jul 2021 07:24)  POCT Blood Glucose.: 121 mg/dL (05 Jul 2021 21:04)  POCT Blood Glucose.: 124 mg/dL (05 Jul 2021 16:53)    I&O's Summary    05 Jul 2021 07:01  -  06 Jul 2021 07:00  --------------------------------------------------------  IN: 711 mL / OUT: 1150 mL / NET: -439 mL        PHYSICAL EXAM:  Vital Signs Last 24 Hrs  T(C): 36.8 (06 Jul 2021 07:27), Max: 36.8 (05 Jul 2021 19:52)  T(F): 98.3 (06 Jul 2021 07:27), Max: 98.3 (05 Jul 2021 19:52)  HR: 67 (06 Jul 2021 07:27) (67 - 75)  BP: 150/88 (06 Jul 2021 07:27) (150/88 - 165/96)  BP(mean): --  RR: 14 (06 Jul 2021 07:27) (14 - 15)  SpO2: 99% (06 Jul 2021 07:27) (98% - 99%)    GENERAL: Not in distress. Alert    HEENT: clear conjuctiva, MMM. no pallor or icterus  CARDIOVASCULAR: RRR S1, S2. No murmur/rubs/gallop  LUNGS: BLAE+, no rales, no wheezing, no rhonchi.    ABDOMEN: ND. Soft,  NT, no guarding / rebound / rigidity. BS normoactive  BACK: No spine tenderness.  EXTREMITIES: no edema. no leg or calf TP.  SKIN: warm and dry  PSYCHIATRIC: Calm.  No agitation.    LABS:                        11.5   4.84  )-----------( 285      ( 05 Jul 2021 06:40 )             34.9     07-06    138  |  105  |  59<H>  ----------------------------<  127<H>  5.2   |  26  |  2.82<H>    Ca    9.0      06 Jul 2021 05:00    TPro  6.5  /  Alb  2.5<L>  /  TBili  0.4  /  DBili  0.1  /  AST  39  /  ALT  41  /  AlkPhos  152<H>  07-05              COVID-19 PCR: NotDetec (21 Jun 2021 23:30)      RADIOLOGY & ADDITIONAL TESTS:  Imaging from Last 24 Hours:    Electrocardiogram/QTc Interval:    COORDINATION OF CARE:  Care Discussed with Consultants/Other Providers:

## 2021-07-06 NOTE — CHART NOTE - NSCHARTNOTEFT_GEN_A_CORE
Nutrition Follow Up Note  Hospital Course (Per Electronic Medical Record): 55M with PMHx of HTN, DM, HLD, ,depression, and prior CVA with no residual deifcits, who presented to [facility] on 06/11/2021 with dysphagia and difficulty ambulating, & dysarthria found to have an acute right corona radiata infarct. Admitted for multidisciplinary rehab program.  Source: Medical Record [X] Patient [X] Family [ ]         Diet: regular, consistent carbohydrate (evening snack), DASH/TLC  Pt tolerating diet with fair-good PO intake. RD provided written and verbal nutrition education at previous follow up. Pt reports that he read written nutrition education materials but pt ws unable to teach back points of nutrition ed. Reviewed nutrition education with pt. Creat elevated, pt encouraged to consume adequate fluids. Pt my be discharged to [facility], social work following.     Enteral/Parenteral Nutrition: N/A    Current Weight: 218.6 lbs (7/5)  217.1 lbs (7/4)  216.2 lbs (6/30)  217.1 lbs (6/29)  218.2 lbs (6/28)  217.5 lbs (6/27)  214.5 lbs (6/26)  215.3 lbs (6/25)      Pertinent Medications: MEDICATIONS  (STANDING):  amLODIPine   Tablet 10 milliGRAM(s) Oral <User Schedule>  apixaban 2.5 milliGRAM(s) Oral every 12 hours  atorvastatin 80 milliGRAM(s) Oral at bedtime  citalopram 20 milliGRAM(s) Oral daily  dextrose 40% Gel 15 Gram(s) Oral once  dextrose 5%. 1000 milliLiter(s) (50 mL/Hr) IV Continuous <Continuous>  dextrose 5%. 1000 milliLiter(s) (100 mL/Hr) IV Continuous <Continuous>  dextrose 50% Injectable 25 Gram(s) IV Push once  dextrose 50% Injectable 12.5 Gram(s) IV Push once  dextrose 50% Injectable 25 Gram(s) IV Push once  gabapentin 600 milliGRAM(s) Oral at bedtime  glucagon  Injectable 1 milliGRAM(s) IntraMuscular once  hydrALAZINE 25 milliGRAM(s) Oral every 8 hours  insulin glargine Injectable (LANTUS) 8 Unit(s) SubCutaneous at bedtime  insulin lispro (ADMELOG) corrective regimen sliding scale   SubCutaneous three times a day before meals  insulin lispro (ADMELOG) corrective regimen sliding scale   SubCutaneous at bedtime  labetalol 100 milliGRAM(s) Oral two times a day  nicotine - 21 mG/24Hr(s) Patch 1 patch Transdermal daily  polyethylene glycol 3350 17 Gram(s) Oral daily  senna 2 Tablet(s) Oral at bedtime  traZODone 50 milliGRAM(s) Oral at bedtime    MEDICATIONS  (PRN):  acetaminophen    Suspension .. 650 milliGRAM(s) Oral every 6 hours PRN Temp greater or equal to 38C (100.4F), Mild Pain (1 - 3)  bisacodyl 5 milliGRAM(s) Oral every 12 hours PRN Constipation      Pertinent Labs:  07-06 Na138 mmol/L Glu 127 mg/dL<H> K+ 5.2 mmol/L Cr  2.82 mg/dL<H> BUN 59 mg/dL<H> 07-05 Alb 2.5 g/dL<L>  POCT glucose x 4: 253 (H) ,139 (H), 121 (H), 124 (H)      Skin: Skin intact of pressure injuries per nursing flow sheets     Edema: No edema per nursing flow sheets     Last BM: on 7/5 Per nursing flowsheets     Estimated Needs:   [X] No Change since Previous Assessment  [ ] Recalculated:     Previous Nutrition Diagnosis:   Moderate malnutrition    Nutrition Diagnosis is [X] Ongoing    [ ] Resolved   [ ] Not Applicable      New Nutrition Diagnosis: [X] Not Applicable  [ ] Inadequate Protein Energy Intake   [ ] Inadequate Oral Intake   [ ] Excessive Energy Intake   [ ] Increased Nutrient Needs   [ ] Obesity   [ ] Altered GI Function   [ ] Unintended Weight Loss   [ ] Food & Nutrition Related Knowledge Deficit  [ ] Limited Adherence to nutrition related recommendations   [ ] Malnutrition      Interventions:   1. Recommend continuing with current diet  2. Continue to review nutrition education on heart healthy consistent carbohydrate diet    Monitoring & Evaluation:   [X] Weights   [X] PO Intake   [X] Follow Up (Per Protocol)  [X] Tolerance to Diet Prescription   [X] Other: Labs & POCT glucose    RD Remains Available.  Aleyda Lora RD

## 2021-07-06 NOTE — PROGRESS NOTE ADULT - ASSESSMENT
56 y/o M with PMHx of HTN, T2DM, HLD, depression, and prior CVA with no residual deficits, who presented to [facility] on 06/11/2021 with dysphagia and difficulty ambulating; found to have an acute right corona radiata infarct. Admitted for multidisciplinary rehab program.    #Acute corona radiata infarct and multiple b/l subacute infarcts  -Continue comprehensive rehab program - PT/OT/SLP per rehab team  -Eliquis 2.5mg q12h and atorvastatin 80mg qhs    #JOSE, baseline Cr unknown - probably CKD3: creatinine worsening  #Hyperkalemia: improved  -Some improvement noted in renal function after IVF. Encourage PO hydration  -Avoid nephrotoxins, monitor bmp   -Nephro on board  -Renal sono reviewed - no b/l hydro, renal mass, or calculi visualized   -Monitor K levels  - encouraged PO hydration  - will give small IVF and repeat BMP in am  - Urine Osm, Urine Sodium,  - UA: no infection or blood. protienuria+  - consider renal cx    #HLD  #HTN  -Still uncontrolled reading in the evening. Will see how pt does today after giving Amlodipine in the evening at 6pm  -Continue Hydralazine + Labetalol. If continues to be hypertensive, can increase Hydralazine to 50mg TID   -Lipitor 80mg qhs    #T2DM, A1C 5.9 - controlled  -FS and ISS  -DC home on Metformin 500mg BID, Glipizide 5mg daily, and Sitagliptin 50mg daily  -Lantus 8un qhs while in rehab    #Neuropathy  -Gabapentin 600mg qhs    #Chronic Tobacco use  -Nicotine 21mg/24hr  -Smoking cessation counseling provided    #Mood/Depression  -Citalopram 20mg qd    #Moderate protein-calorie malnutrition   -Dietary/nutrition appreciated     #DVT ppx - Eliquis  -neg for b/l LE DVTs at [facility]    d/w Dr. Shane    will follow  54 y/o M with PMHx of HTN, T2DM, HLD, depression, and prior CVA with no residual deficits, who presented to [facility] on 06/11/2021 with dysphagia and difficulty ambulating; found to have an acute right corona radiata infarct. Admitted for multidisciplinary rehab program.    #Acute corona radiata infarct and multiple b/l subacute infarcts  -Continue comprehensive rehab program - PT/OT/SLP per rehab team  -Eliquis 2.5mg q12h and atorvastatin 80mg qhs    #JOSE, baseline Cr unknown - probably CKD3: creatinine worsening  #Hyperkalemia: improved  -Some improvement noted in renal function after IVF. Encourage PO hydration  -Avoid nephrotoxins, monitor bmp   -Nephro on board  -Renal sono reviewed - no b/l hydro, renal mass, or calculi visualized   -Monitor K levels  - encouraged PO hydration  - will give small IVF and repeat BMP in am  - Urine Osm, Urine Sodium,  - UA: no infection or blood. protienuria+  - consider renal cx    #HLD  #HTN  - not on target <140/90  -Continue Hydralazine + Labetalol. Increased Hydralazine to 50mg TID 7/6  -Lipitor 80mg qhs    #T2DM, A1C 5.9 - controlled  -FS and ISS  -  home med: Metformin 500mg BID, Glipizide 5mg daily, and Sitagliptin 50mg daily  - Lantus 8un qhs while in rehab  - DC home with Sitagliptin and Glipizide. metformin dced due to JOSE/CKD with close PCP follow up and consistent carb diet. if going to SNF, can dc with lantus 8 with home meds documented on DC care plan.     #Neuropathy  -Gabapentin 600mg qhs    #Chronic Tobacco use  -Nicotine 21mg/24hr  -Smoking cessation counseling provided    #Mood/Depression  -Citalopram 20mg qd    #Moderate protein-calorie malnutrition   -Dietary/nutrition appreciated     #DVT ppx - Eliquis  -neg for b/l LE DVTs at [facility]    d/w Dr. Shane    will follow

## 2021-07-06 NOTE — PROGRESS NOTE ADULT - ASSESSMENT
MARK ANTHONY QUINTANA is a 55M with PMHx of HTN, DM, HLD, ,depression, and prior CVA with no residual deifcits, who presented to [facility] on 06/11/2021 with dysphagia and difficulty ambulating, & dysarthria found to have an acute right corona radiata infarct. Admitted for multidisciplinary rehab program.      #Comprehensive Multidisciplinary Rehab Program:  - Gait, ADL, Functional impairments  - PT/OT/ SLP 3 hours a day 5 days a week  -recreation therapy     #Acute corona radiata infarct and multiple b/l subacute infarcts  - distribution of b/l subacute infarcts suggestive of cardioembolic origin  - not a candidate for tPA or thrombectomy  - c/w Eliquis 2.5mg q12h and atorvastatin 80mg qhs    #HLD  - c/w atorvastatin    #HTN  --monitor BP   --Labetalol increased to 100mg q12h yesterday  - cont. amlodipine 10mg in evening   - cont. hydralazine  25mg TID   --hospitalist following      #CKD  -  Cr worsening  --d/w hospitalist  --consider Renal consult  -- nursing order to increase PO hydration      #DM  - FS and ISS  -Lantus 8 units  -- Adjustment as per hospitalist  - DC home on Metformin 500mg BID, Glipizide 5mg daily, and Sitagliptin 50mg daily    #Neuropathy  - c/w gabapentin 600mg qhs    #Tobacco use  - Nicotine 21mg/24hr    #Mood/Depression  - c/w Citalopram 20mg daily    #Sleep:  - Maintain quiet hours and low stim environment;  --nursing order to turn off TV at 9PM to encourage proper sleep hygiene  - cont.  trazodone    #Pain:  - Tylenol PRN  - avoid sedating meds that may affect cognitive recovery    #GI/Bowel:  - Senna 2 tabs qhs and Miralax PRN  - PO Dulcolax PRN    #/Bladder:  - continent      #Skin/ Pressure Injury Prevention:  - assessment on admission   - Turn Q2hrs in bed while awake, OOB to Chair, PT/OT/SLP     #DVT prophylaxis:  - Eliquis  - SCDs  - neg for b/l LE DVTs at [facility]    #Precautions/ Restrictions  - Falls  - Lungs: Aspiration,   - COVID PCR: neg 6/11    #Dispo: IDR 06/24/2021  - OT: setup/supervision eating/grooming/UBD; min assist toilet transfer/LBD/shower; CG bathing  - PT: CG/min assist transfer; min assist ambulation 70ft with RW; min assist 8 steps with 2 hand rails  - SLP: mod-severe cog deficits--impaired PS, needs assist with money management and meds, impaired memory, +hs insight into deficits; (+) dysarthria; reg/thins  - Goals: Oralia transfers, supervision ambulation 150', supervision 12 steps, oralia eating/grooming/UBD, supervision LBD/bathing/functional transfers--Therapists to clarify if pt. can go home with Intermittent Supervision.  SW has had difficulty getting in touch with pt's [facility] program to extend HHA hours.  Will f/u  with niece regarding if family can provide supervision   - TDD: DC home vs [facility] 7/9    #LABs  - CBC BMP 6/28    --------------------------------------------  Outpatient Follow up:  Neurology - Dr. Nena Dumont on 07/22/2021 @ 11AM via telehealth  PCP  ----------------------------------------

## 2021-07-06 NOTE — CONSULT NOTE ADULT - SUBJECTIVE AND OBJECTIVE BOX
NEPHROLOGY CONSULTATION    CHIEF COMPLAINT: CVA    HPI:  Pt is a 55M with PMH of HTN, DM, HLD, ,depression, and prior CVA with no residual deficits, who presented to [facility] on 06/11/2021 with several days of dysphagia and difficulty ambulating. In the ED, his Utox tested positive for cocaine, though patient denied use, and CT head scan showed findings c/w an acute right corona radiata infarct, along with multiple b/l subacute infarcts. Patient was managed medically, as he was not a candidate for either tPA or thrombectomy. Adm to [facility] 6/21/21, asked to eval for abn renal fx.    ROS:  as above    Allergies:  No Known Allergies    PAST MEDICAL & SURGICAL HISTORY:  Cerebrovascular accident (CVA)  Hypertension  Hyperlipidemia  Diabetes mellitus  Depression    SOCIAL HISTORY:  as above    FAMILY HISTORY:  NC    MEDICATIONS  (STANDING):  amLODIPine   Tablet 10 milliGRAM(s) Oral <User Schedule>  apixaban 2.5 milliGRAM(s) Oral every 12 hours  atorvastatin 80 milliGRAM(s) Oral at bedtime  citalopram 20 milliGRAM(s) Oral daily  dextrose 40% Gel 15 Gram(s) Oral once  dextrose 5%. 1000 milliLiter(s) (50 mL/Hr) IV Continuous <Continuous>  dextrose 5%. 1000 milliLiter(s) (100 mL/Hr) IV Continuous <Continuous>  dextrose 50% Injectable 25 Gram(s) IV Push once  dextrose 50% Injectable 12.5 Gram(s) IV Push once  dextrose 50% Injectable 25 Gram(s) IV Push once  gabapentin 600 milliGRAM(s) Oral at bedtime  glucagon  Injectable 1 milliGRAM(s) IntraMuscular once  hydrALAZINE 50 milliGRAM(s) Oral every 8 hours  insulin glargine Injectable (LANTUS) 8 Unit(s) SubCutaneous at bedtime  insulin lispro (ADMELOG) corrective regimen sliding scale   SubCutaneous three times a day before meals  insulin lispro (ADMELOG) corrective regimen sliding scale   SubCutaneous at bedtime  labetalol 100 milliGRAM(s) Oral two times a day  nicotine - 21 mG/24Hr(s) Patch 1 patch Transdermal daily  polyethylene glycol 3350 17 Gram(s) Oral daily  senna 2 Tablet(s) Oral at bedtime  traZODone 50 milliGRAM(s) Oral at bedtime    Vital Signs Last 24 Hrs  T(C): 36.8 (07-06-21 @ 07:27), Max: 36.8 (07-05-21 @ 19:52)  T(F): 98.3 (07-06-21 @ 07:27), Max: 98.3 (07-05-21 @ 19:52)  HR: 67 (07-06-21 @ 07:27) (67 - 75)  BP: 150/88 (07-06-21 @ 07:27) (150/88 - 165/96)  RR: 14 (07-06-21 @ 07:27) (14 - 15)  SpO2: 99% (07-06-21 @ 07:27) (98% - 99%)    I&O's Detail    05 Jul 2021 07:01  -  06 Jul 2021 07:00  --------------------------------------------------------  IN:    Oral Fluid: 711 mL  Total IN: 711 mL    OUT:    Voided (mL): 1150 mL  Total OUT: 1150 mL    Total NET: -439 mL    s1s2  b/l air entry  soft, ND  no edema    LABS:                        11.5   4.84  )-----------( 285      ( 05 Jul 2021 06:40 )             34.9     07-06    138  |  105  |  59<H>  ----------------------------<  127<H>  5.2   |  26  |  2.82<H>    Ca    9.0      06 Jul 2021 05:00    TPro  6.5  /  Alb  2.5<L>  /  TBili  0.4  /  DBili  0.1  /  AST  39  /  ALT  41  /  AlkPhos  152<H>  07-05    LIVER FUNCTIONS - ( 05 Jul 2021 06:40 )  Alb: 2.5 g/dL / Pro: 6.5 g/dL / ALK PHOS: 152 U/L / ALT: 41 U/L / AST: 39 U/L / GGT: x           A/P:         NEPHROLOGY CONSULTATION    CHIEF COMPLAINT: CVA    HPI:  Pt is a 55M with PMH of HTN, DM, HLD, ,depression, and prior CVA with no residual deficits, who presented to [facility] on 06/11/2021 with several days of dysphagia and difficulty ambulating. In the ED, his Utox tested positive for cocaine, though patient denied use, and CT head scan showed findings c/w an acute right corona radiata infarct, along with multiple b/l subacute infarcts. Patient was managed medically, as he was not a candidate for either tPA or thrombectomy. Adm to [facility] 6/21/21, asked to eval for abn renal fx.    ROS:  as above    Allergies:  No Known Allergies    PAST MEDICAL & SURGICAL HISTORY:  Cerebrovascular accident (CVA)  Hypertension  Hyperlipidemia  Diabetes mellitus  Depression  CKD    SOCIAL HISTORY:  as above    FAMILY HISTORY:  NC    MEDICATIONS  (STANDING):  amLODIPine   Tablet 10 milliGRAM(s) Oral <User Schedule>  apixaban 2.5 milliGRAM(s) Oral every 12 hours  atorvastatin 80 milliGRAM(s) Oral at bedtime  citalopram 20 milliGRAM(s) Oral daily  dextrose 40% Gel 15 Gram(s) Oral once  dextrose 5%. 1000 milliLiter(s) (50 mL/Hr) IV Continuous <Continuous>  dextrose 5%. 1000 milliLiter(s) (100 mL/Hr) IV Continuous <Continuous>  dextrose 50% Injectable 25 Gram(s) IV Push once  dextrose 50% Injectable 12.5 Gram(s) IV Push once  dextrose 50% Injectable 25 Gram(s) IV Push once  gabapentin 600 milliGRAM(s) Oral at bedtime  glucagon  Injectable 1 milliGRAM(s) IntraMuscular once  hydrALAZINE 50 milliGRAM(s) Oral every 8 hours  insulin glargine Injectable (LANTUS) 8 Unit(s) SubCutaneous at bedtime  insulin lispro (ADMELOG) corrective regimen sliding scale   SubCutaneous three times a day before meals  insulin lispro (ADMELOG) corrective regimen sliding scale   SubCutaneous at bedtime  labetalol 100 milliGRAM(s) Oral two times a day  nicotine - 21 mG/24Hr(s) Patch 1 patch Transdermal daily  polyethylene glycol 3350 17 Gram(s) Oral daily  senna 2 Tablet(s) Oral at bedtime  traZODone 50 milliGRAM(s) Oral at bedtime    Vital Signs Last 24 Hrs  T(C): 36.8 (07-06-21 @ 07:27), Max: 36.8 (07-05-21 @ 19:52)  T(F): 98.3 (07-06-21 @ 07:27), Max: 98.3 (07-05-21 @ 19:52)  HR: 67 (07-06-21 @ 07:27) (67 - 75)  BP: 150/88 (07-06-21 @ 07:27) (150/88 - 165/96)  RR: 14 (07-06-21 @ 07:27) (14 - 15)  SpO2: 99% (07-06-21 @ 07:27) (98% - 99%)    I&O's Detail    05 Jul 2021 07:01  -  06 Jul 2021 07:00  --------------------------------------------------------  IN:    Oral Fluid: 711 mL  Total IN: 711 mL    OUT:    Voided (mL): 1150 mL  Total OUT: 1150 mL    Total NET: -439 mL    s1s2  b/l air entry  soft, ND  no edema    LABS:                        11.5   4.84  )-----------( 285      ( 05 Jul 2021 06:40 )             34.9     07-06    138  |  105  |  59<H>  ----------------------------<  127<H>  5.2   |  26  |  2.82<H>    Ca    9.0      06 Jul 2021 05:00    TPro  6.5  /  Alb  2.5<L>  /  TBili  0.4  /  DBili  0.1  /  AST  39  /  ALT  41  /  AlkPhos  152<H>  07-05    LIVER FUNCTIONS - ( 05 Jul 2021 06:40 )  Alb: 2.5 g/dL / Pro: 6.5 g/dL / ALK PHOS: 152 U/L / ALT: 41 U/L / AST: 39 U/L / GGT: x           A/P:    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

## 2021-07-07 LAB
ALBUMIN SERPL ELPH-MCNC: 2.5 G/DL — LOW (ref 3.3–5)
ANION GAP SERPL CALC-SCNC: 8 MMOL/L — SIGNIFICANT CHANGE UP (ref 5–17)
BUN SERPL-MCNC: 54 MG/DL — HIGH (ref 7–23)
CALCIUM SERPL-MCNC: 8.6 MG/DL — SIGNIFICANT CHANGE UP (ref 8.4–10.5)
CHLORIDE SERPL-SCNC: 105 MMOL/L — SIGNIFICANT CHANGE UP (ref 96–108)
CK SERPL-CCNC: 131 U/L — SIGNIFICANT CHANGE UP (ref 30–200)
CO2 SERPL-SCNC: 25 MMOL/L — SIGNIFICANT CHANGE UP (ref 22–31)
CREAT SERPL-MCNC: 2.57 MG/DL — HIGH (ref 0.5–1.3)
GLUCOSE BLDC GLUCOMTR-MCNC: 121 MG/DL — HIGH (ref 70–99)
GLUCOSE BLDC GLUCOMTR-MCNC: 131 MG/DL — HIGH (ref 70–99)
GLUCOSE BLDC GLUCOMTR-MCNC: 173 MG/DL — HIGH (ref 70–99)
GLUCOSE BLDC GLUCOMTR-MCNC: 190 MG/DL — HIGH (ref 70–99)
GLUCOSE SERPL-MCNC: 120 MG/DL — HIGH (ref 70–99)
OSMOLALITY UR: 381 MOSM/KG — SIGNIFICANT CHANGE UP (ref 50–1200)
PHOSPHATE SERPL-MCNC: 4.3 MG/DL — SIGNIFICANT CHANGE UP (ref 2.5–4.5)
POTASSIUM SERPL-MCNC: 4.6 MMOL/L — SIGNIFICANT CHANGE UP (ref 3.5–5.3)
POTASSIUM SERPL-SCNC: 4.6 MMOL/L — SIGNIFICANT CHANGE UP (ref 3.5–5.3)
PROT SERPL-MCNC: 5.6 G/DL — LOW (ref 6–8.3)
PROT SERPL-MCNC: 5.6 G/DL — LOW (ref 6–8.3)
SODIUM SERPL-SCNC: 138 MMOL/L — SIGNIFICANT CHANGE UP (ref 135–145)
URATE SERPL-MCNC: 6.6 MG/DL — SIGNIFICANT CHANGE UP (ref 3.4–8.8)

## 2021-07-07 PROCEDURE — 99232 SBSQ HOSP IP/OBS MODERATE 35: CPT

## 2021-07-07 RX ORDER — HYDRALAZINE HCL 50 MG
75 TABLET ORAL EVERY 8 HOURS
Refills: 0 | Status: DISCONTINUED | OUTPATIENT
Start: 2021-07-07 | End: 2021-07-23

## 2021-07-07 RX ADMIN — Medication 100 MILLIGRAM(S): at 18:27

## 2021-07-07 RX ADMIN — Medication 1 PATCH: at 11:31

## 2021-07-07 RX ADMIN — Medication 1 PATCH: at 18:12

## 2021-07-07 RX ADMIN — APIXABAN 2.5 MILLIGRAM(S): 2.5 TABLET, FILM COATED ORAL at 18:27

## 2021-07-07 RX ADMIN — GABAPENTIN 600 MILLIGRAM(S): 400 CAPSULE ORAL at 21:28

## 2021-07-07 RX ADMIN — Medication 75 MILLIGRAM(S): at 21:28

## 2021-07-07 RX ADMIN — Medication 50 MILLIGRAM(S): at 05:39

## 2021-07-07 RX ADMIN — Medication 1 PATCH: at 11:50

## 2021-07-07 RX ADMIN — Medication 1: at 11:54

## 2021-07-07 RX ADMIN — INSULIN GLARGINE 8 UNIT(S): 100 INJECTION, SOLUTION SUBCUTANEOUS at 21:27

## 2021-07-07 RX ADMIN — Medication 1 PATCH: at 08:14

## 2021-07-07 RX ADMIN — Medication 100 MILLIGRAM(S): at 05:39

## 2021-07-07 RX ADMIN — AMLODIPINE BESYLATE 10 MILLIGRAM(S): 2.5 TABLET ORAL at 18:27

## 2021-07-07 RX ADMIN — Medication 50 MILLIGRAM(S): at 21:28

## 2021-07-07 RX ADMIN — ATORVASTATIN CALCIUM 80 MILLIGRAM(S): 80 TABLET, FILM COATED ORAL at 21:27

## 2021-07-07 RX ADMIN — CITALOPRAM 20 MILLIGRAM(S): 10 TABLET, FILM COATED ORAL at 11:50

## 2021-07-07 RX ADMIN — SENNA PLUS 2 TABLET(S): 8.6 TABLET ORAL at 21:28

## 2021-07-07 RX ADMIN — APIXABAN 2.5 MILLIGRAM(S): 2.5 TABLET, FILM COATED ORAL at 05:39

## 2021-07-07 RX ADMIN — Medication 50 MILLIGRAM(S): at 14:23

## 2021-07-07 NOTE — PROGRESS NOTE ADULT - SUBJECTIVE AND OBJECTIVE BOX
Patient is a 55y old  Male who presents with a chief complaint of Acute right corona radiata CVA (06 Jul 2021 17:49)    HPI:  MARK ANTHONY QUINTANA is a 55M with PMHx of HTN, DM, HLD, ,depression, and prior CVA with no residual deficits, who presented to [facility] on 06/11/2021 with several days of dysphagia and difficulty ambulating. Patient had been experiencing symptoms since 6/8 after coming home from a dental appointment. He was brought to the hospital after a wellness check from his insurance company. In the ED, his Utox tested positive for cocaine, though patient denied use, and CT head scan showed findings c/w an acute right corona radiata infarct, along with multiple b/l subacute infarcts. Patient was managed medically, as he was not a candidate for either tPA or thrombectomy.    Patient was evaluated by PM&R and therapy for functional deficits and gait/ ADL impairments and recommended acute rehabilitation. Patient was medically optimized for discharge to [facility] on 06/21/2021. Patient was seen and examined at the bedside upon arrival.  (21 Jun 2021 15:04)    PAST MEDICAL & SURGICAL HISTORY:  Cerebrovascular accident (CVA)    Hypertension    Hyperlipidemia    Diabetes mellitus    Depression      Allergies    No Known Allergies    Intolerances      ----------------------------------------------------------------------    SUBJECTIVE: Patient seen this morning. No events overnights. Encouraged for adequate hydration for his renal function.      ROS:  Positive:  Denies: headache, lightheadedness, CP, SOB, abdominal pain, dysuria, nausea, constipation      ----------------------------------------------------------------------  PHYSICAL EXAM:    Vital Signs Last 24 Hrs  T(C): 36.7 (07 Jul 2021 08:20), Max: 36.7 (07 Jul 2021 08:20)  T(F): 98.1 (07 Jul 2021 08:20), Max: 98.1 (07 Jul 2021 08:20)  HR: 66 (07 Jul 2021 08:20) (66 - 70)  BP: 134/83 (07 Jul 2021 08:20) (134/83 - 177/96)  BP(mean): --  RR: 16 (07 Jul 2021 08:20) (15 - 16)  SpO2: 99% (07 Jul 2021 08:20) (95% - 99%)  Daily     Daily       Constitutional - NAD, Comfortable  HEENT - NCAT  Chest - breathing comfortably on RA  Cardio - warm and well perfused,   Abdomen -  Soft, NTND,   Extremities - No peripheral edema, No calf tenderness   Neurologic Exam:                    Cognitive -             Orientation: AA O x 4     Speech - (+) mild dysarthria,  No aphasia     Cranial Nerves - Right pupil sluggish, left pupil reactive, Visual fields slightly impaired on right, flattening of right NLF, EOMI, Shoulder shrug intact, +dysarthria,      Motor -                      LEFT    UE - ShAB 5/5, EF 5/5, EE 5/5, WE 5/5,  WNL                    RIGHT UE - ShAB 5/5, EF 5/5, EE 4/5, WE 5/5,  WNL                    LEFT    LE - HF 5/5, KE 5/5, DF 5/5, PF 5/5                    RIGHT LE - 5/5        Sensory - diminished sensation in dorsum and plantar aspect of right foot     Coordination - FTN intact & HTS intact  Psychiatric - mood stable, appropriate      ----------------------------------------------------------------------  RECENT LABS:      07-07    138  |  105  |  54<H>  ----------------------------<  120<H>  4.6   |  25  |  2.57<H>    Ca    8.6      07 Jul 2021 05:50  Phos  4.3     07-07    TPro  x   /  Alb  2.5<L>  /  TBili  x   /  DBili  x   /  AST  x   /  ALT  x   /  AlkPhos  x   07-07    LIVER FUNCTIONS - ( 07 Jul 2021 05:50 )  Alb: 2.5 g/dL / Pro: x     / ALK PHOS: x     / ALT: x     / AST: x     / GGT: x               CAPILLARY BLOOD GLUCOSE      POCT Blood Glucose.: 121 mg/dL (07 Jul 2021 08:16)  POCT Blood Glucose.: 199 mg/dL (06 Jul 2021 21:07)  POCT Blood Glucose.: 162 mg/dL (06 Jul 2021 16:26)    ----------------------------------------------------------------------  RECENT IMAGING:    US Renal (06.25.21 @ 15:30)  IMPRESSION: No bilateral hydronephrosis. The bladder is not well distended, limiting assessment. Bladder volume at the time of examination approximates 64 cc. Bladder wall thickening is suggested although this appearance may be related to nondistended state. Clinical correlation is suggested.        ----------------------------------------------------------------------  MEDICATIONS:  MEDICATIONS  (STANDING):  amLODIPine   Tablet 10 milliGRAM(s) Oral <User Schedule>  apixaban 2.5 milliGRAM(s) Oral every 12 hours  atorvastatin 80 milliGRAM(s) Oral at bedtime  citalopram 20 milliGRAM(s) Oral daily  dextrose 40% Gel 15 Gram(s) Oral once  dextrose 5%. 1000 milliLiter(s) (50 mL/Hr) IV Continuous <Continuous>  dextrose 5%. 1000 milliLiter(s) (100 mL/Hr) IV Continuous <Continuous>  dextrose 50% Injectable 25 Gram(s) IV Push once  dextrose 50% Injectable 12.5 Gram(s) IV Push once  dextrose 50% Injectable 25 Gram(s) IV Push once  gabapentin 600 milliGRAM(s) Oral at bedtime  glucagon  Injectable 1 milliGRAM(s) IntraMuscular once  hydrALAZINE 50 milliGRAM(s) Oral every 8 hours  insulin glargine Injectable (LANTUS) 8 Unit(s) SubCutaneous at bedtime  insulin lispro (ADMELOG) corrective regimen sliding scale   SubCutaneous three times a day before meals  insulin lispro (ADMELOG) corrective regimen sliding scale   SubCutaneous at bedtime  labetalol 100 milliGRAM(s) Oral two times a day  nicotine - 21 mG/24Hr(s) Patch 1 patch Transdermal daily  polyethylene glycol 3350 17 Gram(s) Oral daily  senna 2 Tablet(s) Oral at bedtime  traZODone 50 milliGRAM(s) Oral at bedtime    MEDICATIONS  (PRN):  acetaminophen    Suspension .. 650 milliGRAM(s) Oral every 6 hours PRN Temp greater or equal to 38C (100.4F), Mild Pain (1 - 3)  bisacodyl 5 milliGRAM(s) Oral every 12 hours PRN Constipation    ----------------------------------------------------------------------  Assessment and Plan:   · Assessment	  MARK ANTHONY QUINTANA is a 55M with PMHx of HTN, DM, HLD, ,depression, and prior CVA with no residual deifcits, who presented to [facility] on 06/11/2021 with dysphagia and difficulty ambulating, & dysarthria found to have an acute right corona radiata infarct. Admitted for multidisciplinary rehab program.      #Comprehensive Multidisciplinary Rehab Program:  - Gait, ADL, Functional impairments  - PT/OT/ SLP 3 hours a day 5 days a week  -recreation therapy     #Acute corona radiata infarct and multiple b/l subacute infarcts  - distribution of b/l subacute infarcts suggestive of cardioembolic origin  - not a candidate for tPA or thrombectomy  - c/w Eliquis 2.5mg q12h and atorvastatin 80mg qhs    #HLD  - c/w atorvastatin    #HTN  --monitor BP   --Labetalol increased to 100mg q12h on 7/5  - cont. amlodipine 10mg in evening   - Hydralazine increased to 50 mg TID on 7/6  --hospitalist following      #Acute on chronic CKD  -  Cr trending better today  --d/w hospitalist  --nephrology consult recs appreciated  -- continue to encourage PO hydration      #DM  - FS and ISS  -Lantus 8 units  -- Adjustment as per hospitalist  - If DC home, will be on Glipizide 5mg daily and Sitagliptin 50mg daily (not metformin due to JOSE).  - If DC to [facility], can continue current hospital regimen    #Neuropathy  - c/w gabapentin 600mg qhs    #Tobacco use  - Nicotine 21mg/24hr    #Mood/Depression  - c/w Citalopram 20mg daily    #Sleep:  - Maintain quiet hours and low stim environment;  --nursing order to turn off TV at 9PM to encourage proper sleep hygiene  - cont.  trazodone    #Pain:  - Tylenol PRN  - avoid sedating meds that may affect cognitive recovery    #GI/Bowel:  - Senna 2 tabs qhs and Miralax PRN  - PO Dulcolax PRN    #/Bladder:  - continent      #Skin/ Pressure Injury Prevention:  - assessment on admission   - Turn Q2hrs in bed while awake, OOB to Chair, PT/OT/SLP     #DVT prophylaxis:  - Eliquis  - SCDs  - neg for b/l LE DVTs at [facility]    #Precautions/ Restrictions  - Falls  - Lungs: Aspiration,   - COVID PCR: neg 6/11    #Dispo: IDR 06/24/2021  - OT: setup/supervision eating/grooming/UBD; min assist toilet transfer/LBD/shower; CG bathing  - PT: CG/min assist transfer; min assist ambulation 70ft with RW; min assist 8 steps with 2 hand rails  - SLP: mod-severe cog deficits--impaired PS, needs assist with money management and meds, impaired memory, +hs insight into deficits; (+) dysarthria; reg/thins  - Goals: Oralia transfers, supervision ambulation 150', supervision 12 steps, oralia eating/grooming/UBD, supervision LBD/bathing/functional transfers--Therapists to clarify if pt. can go home with Intermittent Supervision.  SW has had difficulty getting in touch with pt's [facility] program to extend HHA hours.  Will f/u  with niece regarding if family can provide supervision   - TDD: DC home vs [facility] 7/9        --------------------------------------------  Outpatient Follow up:  Neurology - Dr. Nena Dumont on 07/22/2021 @ 11AM via telehealth  PCP  ----------------------------------------             Patient is a 55y old  Male who presents with a chief complaint of Acute right corona radiata CVA (06 Jul 2021 17:49)    HPI:  MARK ANTHONY QUINTANA is a 55M with PMHx of HTN, DM, HLD, ,depression, and prior CVA with no residual deficits, who presented to [facility] on 06/11/2021 with several days of dysphagia and difficulty ambulating. Patient had been experiencing symptoms since 6/8 after coming home from a dental appointment. He was brought to the hospital after a wellness check from his insurance company. In the ED, his Utox tested positive for cocaine, though patient denied use, and CT head scan showed findings c/w an acute right corona radiata infarct, along with multiple b/l subacute infarcts. Patient was managed medically, as he was not a candidate for either tPA or thrombectomy.    Patient was evaluated by PM&R and therapy for functional deficits and gait/ ADL impairments and recommended acute rehabilitation. Patient was medically optimized for discharge to [facility] on 06/21/2021. Patient was seen and examined at the bedside upon arrival.  (21 Jun 2021 15:04)    PAST MEDICAL & SURGICAL HISTORY:  Cerebrovascular accident (CVA)    Hypertension    Hyperlipidemia    Diabetes mellitus    Depression      Allergies    No Known Allergies    Intolerances      ----------------------------------------------------------------------    SUBJECTIVE: Patient seen this morning. No events overnights. Encouraged for adequate hydration for his renal function.      ROS:  Positive:  Denies: headache, lightheadedness, CP, SOB, abdominal pain, dysuria, nausea, constipation      ----------------------------------------------------------------------  PHYSICAL EXAM:    Vital Signs Last 24 Hrs  T(C): 36.7 (07 Jul 2021 08:20), Max: 36.7 (07 Jul 2021 08:20)  T(F): 98.1 (07 Jul 2021 08:20), Max: 98.1 (07 Jul 2021 08:20)  HR: 66 (07 Jul 2021 08:20) (66 - 70)  BP: 134/83 (07 Jul 2021 08:20) (134/83 - 177/96)  BP(mean): --  RR: 16 (07 Jul 2021 08:20) (15 - 16)  SpO2: 99% (07 Jul 2021 08:20) (95% - 99%)  Daily     Daily       Constitutional - NAD, Comfortable  HEENT - NCAT  Chest - breathing comfortably on RA  Cardio - warm and well perfused,   Abdomen -  Soft, NTND,   Extremities - No peripheral edema, No calf tenderness   Neurologic Exam:                    Cognitive -             Orientation: AA O x 4     Speech - (+) mild dysarthria,  No aphasia     Cranial Nerves - Right pupil sluggish, left pupil reactive, Visual fields slightly impaired on right, flattening of right NLF, EOMI, Shoulder shrug intact, +dysarthria,      Motor -                      LEFT    UE - ShAB 5/5, EF 5/5, EE 5/5, WE 5/5,  WNL                    RIGHT UE - ShAB 5/5, EF 5/5, EE 4/5, WE 5/5,  WNL                    LEFT    LE - HF 5/5, KE 5/5, DF 5/5, PF 5/5                    RIGHT LE - 5/5        Sensory - diminished sensation in dorsum and plantar aspect of right foot     Coordination - FTN intact & HTS intact  Psychiatric - mood stable, appropriate      ----------------------------------------------------------------------  RECENT LABS:      07-07    138  |  105  |  54<H>  ----------------------------<  120<H>  4.6   |  25  |  2.57<H>    Ca    8.6      07 Jul 2021 05:50  Phos  4.3     07-07    TPro  x   /  Alb  2.5<L>  /  TBili  x   /  DBili  x   /  AST  x   /  ALT  x   /  AlkPhos  x   07-07    LIVER FUNCTIONS - ( 07 Jul 2021 05:50 )  Alb: 2.5 g/dL / Pro: x     / ALK PHOS: x     / ALT: x     / AST: x     / GGT: x               CAPILLARY BLOOD GLUCOSE      POCT Blood Glucose.: 121 mg/dL (07 Jul 2021 08:16)  POCT Blood Glucose.: 199 mg/dL (06 Jul 2021 21:07)  POCT Blood Glucose.: 162 mg/dL (06 Jul 2021 16:26)    ----------------------------------------------------------------------  RECENT IMAGING:    US Renal (06.25.21 @ 15:30)  IMPRESSION: No bilateral hydronephrosis. The bladder is not well distended, limiting assessment. Bladder volume at the time of examination approximates 64 cc. Bladder wall thickening is suggested although this appearance may be related to nondistended state. Clinical correlation is suggested.        ----------------------------------------------------------------------  MEDICATIONS:  MEDICATIONS  (STANDING):  amLODIPine   Tablet 10 milliGRAM(s) Oral <User Schedule>  apixaban 2.5 milliGRAM(s) Oral every 12 hours  atorvastatin 80 milliGRAM(s) Oral at bedtime  citalopram 20 milliGRAM(s) Oral daily  dextrose 40% Gel 15 Gram(s) Oral once  dextrose 5%. 1000 milliLiter(s) (50 mL/Hr) IV Continuous <Continuous>  dextrose 5%. 1000 milliLiter(s) (100 mL/Hr) IV Continuous <Continuous>  dextrose 50% Injectable 25 Gram(s) IV Push once  dextrose 50% Injectable 12.5 Gram(s) IV Push once  dextrose 50% Injectable 25 Gram(s) IV Push once  gabapentin 600 milliGRAM(s) Oral at bedtime  glucagon  Injectable 1 milliGRAM(s) IntraMuscular once  hydrALAZINE 50 milliGRAM(s) Oral every 8 hours  insulin glargine Injectable (LANTUS) 8 Unit(s) SubCutaneous at bedtime  insulin lispro (ADMELOG) corrective regimen sliding scale   SubCutaneous three times a day before meals  insulin lispro (ADMELOG) corrective regimen sliding scale   SubCutaneous at bedtime  labetalol 100 milliGRAM(s) Oral two times a day  nicotine - 21 mG/24Hr(s) Patch 1 patch Transdermal daily  polyethylene glycol 3350 17 Gram(s) Oral daily  senna 2 Tablet(s) Oral at bedtime  traZODone 50 milliGRAM(s) Oral at bedtime    MEDICATIONS  (PRN):  acetaminophen    Suspension .. 650 milliGRAM(s) Oral every 6 hours PRN Temp greater or equal to 38C (100.4F), Mild Pain (1 - 3)  bisacodyl 5 milliGRAM(s) Oral every 12 hours PRN Constipation    ----------------------------------------------------------------------  Assessment and Plan:   · Assessment	  MARK ANTHONY QUINTANA is a 55M with PMHx of HTN, DM, HLD, ,depression, and prior CVA with no residual deifcits, who presented to [facility] on 06/11/2021 with dysphagia and difficulty ambulating, & dysarthria found to have an acute right corona radiata infarct. Admitted for multidisciplinary rehab program.      #Comprehensive Multidisciplinary Rehab Program:  - Gait, ADL, Functional impairments  - PT/OT/ SLP 3 hours a day 5 days a week  -recreation therapy     #Acute corona radiata infarct and multiple b/l subacute infarcts  - distribution of b/l subacute infarcts suggestive of cardioembolic origin  - not a candidate for tPA or thrombectomy  - c/w Eliquis 2.5mg q12h and atorvastatin 80mg qhs    #HLD  - c/w atorvastatin    #HTN  -- BP improved today--cont. to monitor  --Labetalol increased to 100mg q12h on 7/5  - cont. amlodipine 10mg in evening   - Hydralazine increased to 50 mg TID on 7/6  --hospitalist following      #Acute on chronic CKD  -  Cr trending better today  --d/w hospitalist  --nephrology consult recs appreciated  -- continue to encourage PO hydration      #DM  - FS and ISS  -Lantus 8 units  -- Adjustment as per hospitalist  - If DC home, will be on Glipizide 5mg daily and Sitagliptin 50mg daily (not metformin due to JOSE).  - If DC to [facility], can continue current hospital regimen    #Neuropathy  - c/w gabapentin 600mg qhs    #Tobacco use  - Nicotine 21mg/24hr    #Mood/Depression  - c/w Citalopram 20mg daily    #Sleep:  - Maintain quiet hours and low stim environment;  --nursing order to turn off TV at 9PM to encourage proper sleep hygiene  - cont.  trazodone    #Pain:  - Tylenol PRN  - avoid sedating meds that may affect cognitive recovery    #GI/Bowel:  - Senna 2 tabs qhs and Miralax PRN  - PO Dulcolax PRN    #/Bladder:  - continent      #Skin/ Pressure Injury Prevention:  - assessment on admission   - Turn Q2hrs in bed while awake, OOB to Chair, PT/OT/SLP     #DVT prophylaxis:  - Eliquis  - SCDs  - neg for b/l LE DVTs at [facility]    #Precautions/ Restrictions  - Falls  - Lungs: Aspiration,   - COVID PCR: neg 6/11    #Dispo: IDR 07/01/2021  - OT: setup/supervision eating/grooming/UBD; min assist toilet transfer/LBD/shower; CG bathing  - PT: CG/min assist transfer; min assist ambulation 70ft with RW; min assist 8 steps with 2 hand rails  - SLP: mod-severe cog deficits--impaired PS, needs assist with money management and meds, impaired memory, +hs insight into deficits; (+) dysarthria; reg/thins  - Goals: Oralia transfers, supervision ambulation 150', supervision 12 steps, oralia eating/grooming/UBD, supervision LBD/bathing/functional transfers--Therapists to clarify if pt. can go home with Intermittent Supervision.  SW has had difficulty getting in touch with pt's [facility] program to extend HHA hours.  Will f/u  with niece regarding if family can provide supervision   - TDD: DC home vs [facility] 7/9        --------------------------------------------  Outpatient Follow up:  Neurology - Dr. Nena Dumont on 07/22/2021 @ 11AM via telehealth  PCP  ----------------------------------------

## 2021-07-07 NOTE — PROGRESS NOTE ADULT - ASSESSMENT
56 y/o M with PMHx of HTN, T2DM, HLD, depression, and prior CVA with no residual deficits, who presented to [facility] on 06/11/2021 with dysphagia and difficulty ambulating; found to have an acute right corona radiata infarct. Admitted for multidisciplinary rehab program.    #Acute corona radiata infarct and multiple b/l subacute infarcts  -Continue comprehensive rehab program - PT/OT/SLP per rehab team  -Eliquis 2.5mg q12h and atorvastatin 80mg qhs    #JOSE, baseline Cr unknown - probably CKD3: creatinine improving  #Hyperkalemia: improved  - Encourage PO hydration  -Avoid nephrotoxins, monitor bmp   -Renal sono reviewed - no b/l hydro, renal mass, or calculi visualized   -Monitor K levels  - encouraged PO hydration  - Urine Osm, Urine Sodium,  - UA: no infection or blood. protienuria+  - Renal cx noted    #HLD  #HTN  - target <140/90  -Continue Hydralazine + Labetalol. Increased Hydralazine to 50mg TID 7/6  -Lipitor 80mg qhs    #T2DM, A1C 5.9 - controlled  -FS and ISS  -  home med: Metformin 500mg BID, Glipizide 5mg daily, and Sitagliptin 50mg daily  - Lantus 8un qhs while in rehab  - DC home with Sitagliptin and Glipizide. metformin dced due to JOSE/CKD with close PCP follow up and consistent carb diet. if going to SNF, can dc with lantus 8, with home meds documented on DC care plan.     #Neuropathy  -Gabapentin 600mg qhs    #Chronic Tobacco use  -Nicotine 21mg/24hr  -Smoking cessation counseling provided    #Mood/Depression  -Citalopram 20mg qd    #Moderate protein-calorie malnutrition   -Dietary/nutrition appreciated     #DVT ppx - Eliquis  -neg for b/l LE DVTs at [facility]    d/w Dr. Shane    will follow

## 2021-07-07 NOTE — PROGRESS NOTE ADULT - SUBJECTIVE AND OBJECTIVE BOX
Medicine Progress Note    Patient is a 55y old  Male who presents with a chief complaint of Acute right corona radiata CVA (06 Jul 2021 11:33)      SUBJECTIVE / OVERNIGHT EVENTS:  seen and examined  Chart reviewed  No overnight events  Limb weakness improving with therapy  BM+  No pain  No complaints    creatinine improving with hydration PO. patient refused IV line as per RN    ADDITIONAL REVIEW OF SYSTEMS:  no fever/chills/CP/sob/palpitation/dizziness/ abd pain/nausea/vomiting/diarrhea/constipation/headaches. all other ROS neg      PHYSICAL EXAM:    Vital Signs Last 24 Hrs  T(C): 36.7 (07 Jul 2021 08:20), Max: 36.7 (07 Jul 2021 08:20)  T(F): 98.1 (07 Jul 2021 08:20), Max: 98.1 (07 Jul 2021 08:20)  HR: 66 (07 Jul 2021 08:20) (66 - 70)  BP: 134/83 (07 Jul 2021 08:20) (134/83 - 177/96)  BP(mean): --  RR: 16 (07 Jul 2021 08:20) (15 - 16)  SpO2: 99% (07 Jul 2021 08:20) (95% - 99%)    I&O's Detail    06 Jul 2021 07:01  -  07 Jul 2021 07:00  --------------------------------------------------------  IN:  Total IN: 0 mL    OUT:    Voided (mL): 1500 mL  Total OUT: 1500 mL    Total NET: -1500 mL  CAPILLARY BLOOD GLUCOSE      POCT Blood Glucose.: 173 mg/dL (07 Jul 2021 11:48)  POCT Blood Glucose.: 121 mg/dL (07 Jul 2021 08:16)  POCT Blood Glucose.: 199 mg/dL (06 Jul 2021 21:07)  POCT Blood Glucose.: 162 mg/dL (06 Jul 2021 16:26)          GENERAL: Not in distress. Alert    HEENT: clear conjuctiva, MMM. no pallor or icterus  CARDIOVASCULAR: RRR S1, S2. No murmur/rubs/gallop  LUNGS: BLAE+, no rales, no wheezing, no rhonchi.    ABDOMEN: ND. Soft,  NT, no guarding / rebound / rigidity. BS normoactive  BACK: No spine tenderness.  EXTREMITIES: no edema. no leg or calf TP.  SKIN: warm and dry  PSYCHIATRIC: Calm.  No agitation.    LABS:    07-07    138  |  105  |  54<H>  ----------------------------<  120<H>  4.6   |  25  |  2.57<H>    Ca    8.6      07 Jul 2021 05:50  Phos  4.3     07-07    TPro  5.6<L>  /  Alb  2.5<L>  /  TBili  x   /  DBili  x   /  AST  x   /  ALT  x   /  AlkPhos  x   07-07                 COVID-19 PCR: NotDetec (21 Jun 2021 23:30)      RADIOLOGY & ADDITIONAL TESTS:  Imaging from Last 24 Hours:    Electrocardiogram/QTc Interval:    COORDINATION OF CARE:  Care Discussed with Consultants/Other Providers:

## 2021-07-07 NOTE — PROGRESS NOTE ADULT - SUBJECTIVE AND OBJECTIVE BOX
No distress    Vital Signs Last 24 Hrs  T(C): 36.4 (07-07-21 @ 19:45), Max: 36.7 (07-07-21 @ 08:20)  T(F): 97.6 (07-07-21 @ 19:45), Max: 98.1 (07-07-21 @ 08:20)  HR: 63 (07-07-21 @ 19:45) (62 - 70)  BP: 159/84 (07-07-21 @ 19:45) (134/83 - 177/96)  RR: 15 (07-07-21 @ 19:45) (15 - 16)  SpO2: 98% (07-07-21 @ 19:45) (95% - 99%)    I&O's Detail    06 Jul 2021 07:01  -  07 Jul 2021 07:00  --------------------------------------------------------  OUT:    Voided (mL): 1500 mL  Total OUT: 1500 mL    Total NET: -1500 mL      07 Jul 2021 07:01  -  07 Jul 2021 20:13  --------------------------------------------------------  IN:    Oral Fluid: 620 mL  Total IN: 620 mL    OUT:    Voided (mL): 360 mL  Total OUT: 360 mL    Total NET: 260 mL    s1s2  b/l air entry  soft, ND  no edema    07 Jul 2021 05:50    138    |  105    |  54     ----------------------------<  120    4.6     |  25     |  2.57     Ca    8.6        07 Jul 2021 05:50  Phos  4.3       07 Jul 2021 05:50    TPro  5.6    /  Alb  2.5    /  TBili  x      /  DBili  x      /  AST  x      /  ALT  x      /  AlkPhos  x      07 Jul 2021 05:50    LIVER FUNCTIONS - ( 07 Jul 2021 05:50 )  Alb: 2.5 g/dL / Pro: 5.6 g/dL / ALK PHOS: x     / ALT: x     / AST: x     / GGT: x           acetaminophen    Suspension .. 650 milliGRAM(s) Oral every 6 hours PRN  amLODIPine   Tablet 10 milliGRAM(s) Oral <User Schedule>  apixaban 2.5 milliGRAM(s) Oral every 12 hours  atorvastatin 80 milliGRAM(s) Oral at bedtime  bisacodyl 5 milliGRAM(s) Oral every 12 hours PRN  citalopram 20 milliGRAM(s) Oral daily  dextrose 40% Gel 15 Gram(s) Oral once  dextrose 5%. 1000 milliLiter(s) IV Continuous <Continuous>  dextrose 5%. 1000 milliLiter(s) IV Continuous <Continuous>  dextrose 50% Injectable 25 Gram(s) IV Push once  dextrose 50% Injectable 12.5 Gram(s) IV Push once  dextrose 50% Injectable 25 Gram(s) IV Push once  gabapentin 600 milliGRAM(s) Oral at bedtime  glucagon  Injectable 1 milliGRAM(s) IntraMuscular once  hydrALAZINE 50 milliGRAM(s) Oral every 8 hours  insulin glargine Injectable (LANTUS) 8 Unit(s) SubCutaneous at bedtime  insulin lispro (ADMELOG) corrective regimen sliding scale   SubCutaneous three times a day before meals  insulin lispro (ADMELOG) corrective regimen sliding scale   SubCutaneous at bedtime  labetalol 100 milliGRAM(s) Oral two times a day  nicotine - 21 mG/24Hr(s) Patch 1 patch Transdermal daily  polyethylene glycol 3350 17 Gram(s) Oral daily  senna 2 Tablet(s) Oral at bedtime  traZODone 50 milliGRAM(s) Oral at bedtime    A/P:    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

## 2021-07-08 LAB
ALBUMIN SERPL ELPH-MCNC: 2.5 G/DL — LOW (ref 3.3–5)
ALP SERPL-CCNC: 152 U/L — HIGH (ref 40–120)
ALT FLD-CCNC: 38 U/L — SIGNIFICANT CHANGE UP (ref 10–45)
ANION GAP SERPL CALC-SCNC: 7 MMOL/L — SIGNIFICANT CHANGE UP (ref 5–17)
AST SERPL-CCNC: 34 U/L — SIGNIFICANT CHANGE UP (ref 10–40)
BILIRUB DIRECT SERPL-MCNC: 0 MG/DL — SIGNIFICANT CHANGE UP (ref 0–0.2)
BILIRUB INDIRECT FLD-MCNC: 0.4 MG/DL — SIGNIFICANT CHANGE UP (ref 0.2–1)
BILIRUB SERPL-MCNC: 0.4 MG/DL — SIGNIFICANT CHANGE UP (ref 0.2–1.2)
BUN SERPL-MCNC: 51 MG/DL — HIGH (ref 7–23)
CALCIUM SERPL-MCNC: 8.2 MG/DL — LOW (ref 8.4–10.5)
CHLORIDE SERPL-SCNC: 104 MMOL/L — SIGNIFICANT CHANGE UP (ref 96–108)
CO2 SERPL-SCNC: 25 MMOL/L — SIGNIFICANT CHANGE UP (ref 22–31)
CREAT ?TM UR-MCNC: 93 MG/DL — SIGNIFICANT CHANGE UP
CREAT SERPL-MCNC: 2.54 MG/DL — HIGH (ref 0.5–1.3)
GLUCOSE BLDC GLUCOMTR-MCNC: 131 MG/DL — HIGH (ref 70–99)
GLUCOSE BLDC GLUCOMTR-MCNC: 159 MG/DL — HIGH (ref 70–99)
GLUCOSE BLDC GLUCOMTR-MCNC: 162 MG/DL — HIGH (ref 70–99)
GLUCOSE BLDC GLUCOMTR-MCNC: 180 MG/DL — HIGH (ref 70–99)
GLUCOSE SERPL-MCNC: 165 MG/DL — HIGH (ref 70–99)
HCT VFR BLD CALC: 33.5 % — LOW (ref 39–50)
HGB BLD-MCNC: 10.9 G/DL — LOW (ref 13–17)
MCHC RBC-ENTMCNC: 29.9 PG — SIGNIFICANT CHANGE UP (ref 27–34)
MCHC RBC-ENTMCNC: 32.5 GM/DL — SIGNIFICANT CHANGE UP (ref 32–36)
MCV RBC AUTO: 91.8 FL — SIGNIFICANT CHANGE UP (ref 80–100)
NRBC # BLD: 0 /100 WBCS — SIGNIFICANT CHANGE UP (ref 0–0)
PLATELET # BLD AUTO: 267 K/UL — SIGNIFICANT CHANGE UP (ref 150–400)
POTASSIUM SERPL-MCNC: 4.2 MMOL/L — SIGNIFICANT CHANGE UP (ref 3.5–5.3)
POTASSIUM SERPL-SCNC: 4.2 MMOL/L — SIGNIFICANT CHANGE UP (ref 3.5–5.3)
PROT SERPL-MCNC: 6.5 G/DL — SIGNIFICANT CHANGE UP (ref 6–8.3)
RBC # BLD: 3.65 M/UL — LOW (ref 4.2–5.8)
RBC # FLD: 10.8 % — SIGNIFICANT CHANGE UP (ref 10.3–14.5)
SARS-COV-2 RNA SPEC QL NAA+PROBE: SIGNIFICANT CHANGE UP
SODIUM SERPL-SCNC: 136 MMOL/L — SIGNIFICANT CHANGE UP (ref 135–145)
SODIUM UR-SCNC: 32 MMOL/L — SIGNIFICANT CHANGE UP
WBC # BLD: 4.78 K/UL — SIGNIFICANT CHANGE UP (ref 3.8–10.5)
WBC # FLD AUTO: 4.78 K/UL — SIGNIFICANT CHANGE UP (ref 3.8–10.5)

## 2021-07-08 PROCEDURE — 99233 SBSQ HOSP IP/OBS HIGH 50: CPT

## 2021-07-08 PROCEDURE — 99232 SBSQ HOSP IP/OBS MODERATE 35: CPT

## 2021-07-08 RX ORDER — TRAZODONE HCL 50 MG
1 TABLET ORAL
Qty: 0 | Refills: 0 | DISCHARGE
Start: 2021-07-08

## 2021-07-08 RX ORDER — HYDRALAZINE HCL 50 MG
3 TABLET ORAL
Qty: 0 | Refills: 0 | DISCHARGE
Start: 2021-07-08

## 2021-07-08 RX ORDER — INSULIN GLARGINE 100 [IU]/ML
0 INJECTION, SOLUTION SUBCUTANEOUS
Qty: 0 | Refills: 0 | DISCHARGE
Start: 2021-07-08

## 2021-07-08 RX ORDER — GABAPENTIN 400 MG/1
2 CAPSULE ORAL
Qty: 0 | Refills: 0 | DISCHARGE
Start: 2021-07-08

## 2021-07-08 RX ORDER — ACETAMINOPHEN 500 MG
20.31 TABLET ORAL
Qty: 0 | Refills: 0 | DISCHARGE
Start: 2021-07-08

## 2021-07-08 RX ORDER — NICOTINE POLACRILEX 2 MG
0 GUM BUCCAL
Qty: 0 | Refills: 0 | DISCHARGE
Start: 2021-07-08

## 2021-07-08 RX ORDER — SENNA PLUS 8.6 MG/1
2 TABLET ORAL
Qty: 0 | Refills: 0 | DISCHARGE
Start: 2021-07-08

## 2021-07-08 RX ORDER — LABETALOL HCL 100 MG
1 TABLET ORAL
Qty: 0 | Refills: 0 | DISCHARGE
Start: 2021-07-08

## 2021-07-08 RX ORDER — AMLODIPINE BESYLATE 2.5 MG/1
1 TABLET ORAL
Qty: 0 | Refills: 0 | DISCHARGE
Start: 2021-07-08

## 2021-07-08 RX ORDER — APIXABAN 2.5 MG/1
1 TABLET, FILM COATED ORAL
Qty: 0 | Refills: 0 | DISCHARGE
Start: 2021-07-08

## 2021-07-08 RX ORDER — ATORVASTATIN CALCIUM 80 MG/1
1 TABLET, FILM COATED ORAL
Qty: 0 | Refills: 0 | DISCHARGE
Start: 2021-07-08

## 2021-07-08 RX ORDER — CITALOPRAM 10 MG/1
1 TABLET, FILM COATED ORAL
Qty: 0 | Refills: 0 | DISCHARGE
Start: 2021-07-08

## 2021-07-08 RX ORDER — POLYETHYLENE GLYCOL 3350 17 G/17G
17 POWDER, FOR SOLUTION ORAL
Qty: 0 | Refills: 0 | DISCHARGE
Start: 2021-07-08

## 2021-07-08 RX ADMIN — Medication 75 MILLIGRAM(S): at 05:24

## 2021-07-08 RX ADMIN — Medication 1 PATCH: at 11:54

## 2021-07-08 RX ADMIN — Medication 100 MILLIGRAM(S): at 18:11

## 2021-07-08 RX ADMIN — AMLODIPINE BESYLATE 10 MILLIGRAM(S): 2.5 TABLET ORAL at 18:11

## 2021-07-08 RX ADMIN — Medication 50 MILLIGRAM(S): at 21:28

## 2021-07-08 RX ADMIN — Medication 1 PATCH: at 07:30

## 2021-07-08 RX ADMIN — GABAPENTIN 600 MILLIGRAM(S): 400 CAPSULE ORAL at 21:28

## 2021-07-08 RX ADMIN — Medication 1: at 11:52

## 2021-07-08 RX ADMIN — Medication 100 MILLIGRAM(S): at 05:24

## 2021-07-08 RX ADMIN — Medication 1: at 16:52

## 2021-07-08 RX ADMIN — APIXABAN 2.5 MILLIGRAM(S): 2.5 TABLET, FILM COATED ORAL at 05:24

## 2021-07-08 RX ADMIN — APIXABAN 2.5 MILLIGRAM(S): 2.5 TABLET, FILM COATED ORAL at 18:11

## 2021-07-08 RX ADMIN — Medication 75 MILLIGRAM(S): at 21:27

## 2021-07-08 RX ADMIN — INSULIN GLARGINE 8 UNIT(S): 100 INJECTION, SOLUTION SUBCUTANEOUS at 21:28

## 2021-07-08 RX ADMIN — CITALOPRAM 20 MILLIGRAM(S): 10 TABLET, FILM COATED ORAL at 11:52

## 2021-07-08 RX ADMIN — SENNA PLUS 2 TABLET(S): 8.6 TABLET ORAL at 21:28

## 2021-07-08 RX ADMIN — ATORVASTATIN CALCIUM 80 MILLIGRAM(S): 80 TABLET, FILM COATED ORAL at 21:27

## 2021-07-08 RX ADMIN — Medication 75 MILLIGRAM(S): at 14:54

## 2021-07-08 NOTE — PROGRESS NOTE ADULT - ASSESSMENT
54 y/o M with PMHx of HTN, T2DM, HLD, depression, and prior CVA with no residual deficits, who presented to [facility] on 06/11/2021 with dysphagia and difficulty ambulating; found to have an acute right corona radiata infarct. Admitted for multidisciplinary rehab program- pt.ot.dvt ppx    #Acute corona radiata infarct and multiple b/l subacute infarcts  -Eliquis 2.5mg q12h and atorvastatin     #JOSE, baseline Cr unknown - probably CKD3: creatinine improving  #Hyperkalemia: improved  - Encourage PO hydration  -Avoid nephrotoxins, monitor bmp   -Renal sono reviewed - no b/l hydro, renal mass, or calculi visualized   -Monitor K levels  - encouraged PO hydration  - Urine Osm, Urine Sodium,  - UA: no infection or blood. protienuria+  - Renal cx noted    #HTN  - target <140/90  -Continue Hydralazine , Labetalol.     # HLD- Lipitor     #T2DM, A1C 5.9 - controlled  -FS and ISS  -  home med: Metformin 500mg BID, Glipizide 5mg daily, and Sitagliptin 50mg daily  - Lantus 8un qhs while in rehab  - DC home with Sitagliptin and Glipizide. metformin dced due to JOSE/CKD with close PCP follow up and consistent carb diet. if going to SNF, can dc with lantus 8, with home meds documented on DC care plan.     #Neuropathy  -Gabapentin    #Chronic Tobacco use  -Nicotine 21mg/24hr  -Smoking cessation counseling provided    #Mood/Depression  -Citalopram       #DVT ppx - Eliquis  -neg for b/l LE DVTs at [facility]    d/w Dr. Shane  will follow

## 2021-07-08 NOTE — PROGRESS NOTE ADULT - SUBJECTIVE AND OBJECTIVE BOX
55y old  Male who presents with a chief complaint of Acute right corona radiata CVA     seen at the bedside no new complaints, no n/v, no sob.      Vital Signs Last 24 Hrs  T(C): 36.4 (07 Jul 2021 19:45), Max: 36.4 (07 Jul 2021 19:45)  T(F): 97.6 (07 Jul 2021 19:45), Max: 97.6 (07 Jul 2021 19:45)  HR: 68 (08 Jul 2021 08:29) (62 - 73)  BP: 147/78 (08 Jul 2021 08:29) (133/80 - 159/84)  BP(mean): --  RR: 16 (08 Jul 2021 08:29) (15 - 16)  SpO2: 97% (08 Jul 2021 08:29) (94% - 99%)    GENERAL- NAD  EAR/NOSE/MOUTH/THROAT - no pharyngeal exudates, no oral leisions,  MMM  EYES- MAT, conjunctiva and Sclera clear  NECK- supple  RESPIRATORY-  clear to auscultation bilaterally, non laboured breathing  CARDIOVASCULAR - SIS2, RRR  GI - soft NT BS present  EXTREMITIES- no pedal edema  NEUROLOGY- RLE weakness, dysarthria  SKIN- no rashes, warm to touch  PSYCHIATRY- AAO X 3                    10.9                 136  | 25   | 51           4.78  >-----------< 267     ------------------------< 165                   33.5                 4.2  | 104  | 2.54                                         Ca 8.2   Mg x     Ph x        CAPILLARY BLOOD GLUCOSE      POCT Blood Glucose.: 162 mg/dL (08 Jul 2021 11:49)  POCT Blood Glucose.: 131 mg/dL (08 Jul 2021 08:24)  POCT Blood Glucose.: 190 mg/dL (07 Jul 2021 21:26)  POCT Blood Glucose.: 131 mg/dL (07 Jul 2021 17:11)    MEDICATIONS  (STANDING):  amLODIPine   Tablet 10 milliGRAM(s) Oral <User Schedule>  apixaban 2.5 milliGRAM(s) Oral every 12 hours  atorvastatin 80 milliGRAM(s) Oral at bedtime  citalopram 20 milliGRAM(s) Oral daily  gabapentin 600 milliGRAM(s) Oral at bedtime  glucagon  Injectable 1 milliGRAM(s) IntraMuscular once  hydrALAZINE 75 milliGRAM(s) Oral every 8 hours  insulin glargine Injectable (LANTUS) 8 Unit(s) SubCutaneous at bedtime  insulin lispro (ADMELOG) corrective regimen sliding scale   SubCutaneous three times a day before meals  insulin lispro (ADMELOG) corrective regimen sliding scale   SubCutaneous at bedtime  labetalol 100 milliGRAM(s) Oral two times a day  nicotine - 21 mG/24Hr(s) Patch 1 patch Transdermal daily  polyethylene glycol 3350 17 Gram(s) Oral daily  senna 2 Tablet(s) Oral at bedtime  traZODone 50 milliGRAM(s) Oral at bedtime    MEDICATIONS  (PRN):  acetaminophen    Suspension .. 650 milliGRAM(s) Oral every 6 hours PRN Temp greater or equal to 38C (100.4F), Mild Pain (1 - 3)  bisacodyl 5 milliGRAM(s) Oral every 12 hours PRN Constipation   55y old  Male who presents with a chief complaint of Acute right corona radiata CVA     seen at the bedside no new complaints, no n/v, no sob.      Vital Signs Last 24 Hrs  T(C): 36.4 (07 Jul 2021 19:45), Max: 36.4 (07 Jul 2021 19:45)  T(F): 97.6 (07 Jul 2021 19:45), Max: 97.6 (07 Jul 2021 19:45)  HR: 68 (08 Jul 2021 08:29) (62 - 73)  BP: 147/78 (08 Jul 2021 08:29) (133/80 - 159/84)  BP(mean): --  RR: 16 (08 Jul 2021 08:29) (15 - 16)  SpO2: 97% (08 Jul 2021 08:29) (94% - 99%)    GENERAL- NAD  EAR/NOSE/MOUTH/THROAT - no pharyngeal exudates, no oral leisions,  MMM  EYES- MAT, conjunctiva and Sclera clear  NECK- supple  RESPIRATORY-  clear to auscultation bilaterally, non laboured breathing  CARDIOVASCULAR - SIS2, RRR  GI - soft NT BS present  EXTREMITIES- RLE edema+  NEUROLOGY- R sided weakness, dysarthria  SKIN- no rashes, warm to touch  PSYCHIATRY- AAO X 3                    10.9                 136  | 25   | 51           4.78  >-----------< 267     ------------------------< 165                   33.5                 4.2  | 104  | 2.54                                         Ca 8.2   Mg x     Ph x        CAPILLARY BLOOD GLUCOSE      POCT Blood Glucose.: 162 mg/dL (08 Jul 2021 11:49)  POCT Blood Glucose.: 131 mg/dL (08 Jul 2021 08:24)  POCT Blood Glucose.: 190 mg/dL (07 Jul 2021 21:26)  POCT Blood Glucose.: 131 mg/dL (07 Jul 2021 17:11)    MEDICATIONS  (STANDING):  amLODIPine   Tablet 10 milliGRAM(s) Oral <User Schedule>  apixaban 2.5 milliGRAM(s) Oral every 12 hours  atorvastatin 80 milliGRAM(s) Oral at bedtime  citalopram 20 milliGRAM(s) Oral daily  gabapentin 600 milliGRAM(s) Oral at bedtime  glucagon  Injectable 1 milliGRAM(s) IntraMuscular once  hydrALAZINE 75 milliGRAM(s) Oral every 8 hours  insulin glargine Injectable (LANTUS) 8 Unit(s) SubCutaneous at bedtime  insulin lispro (ADMELOG) corrective regimen sliding scale   SubCutaneous three times a day before meals  insulin lispro (ADMELOG) corrective regimen sliding scale   SubCutaneous at bedtime  labetalol 100 milliGRAM(s) Oral two times a day  nicotine - 21 mG/24Hr(s) Patch 1 patch Transdermal daily  polyethylene glycol 3350 17 Gram(s) Oral daily  senna 2 Tablet(s) Oral at bedtime  traZODone 50 milliGRAM(s) Oral at bedtime    MEDICATIONS  (PRN):  acetaminophen    Suspension .. 650 milliGRAM(s) Oral every 6 hours PRN Temp greater or equal to 38C (100.4F), Mild Pain (1 - 3)  bisacodyl 5 milliGRAM(s) Oral every 12 hours PRN Constipation

## 2021-07-08 NOTE — PROGRESS NOTE ADULT - SUBJECTIVE AND OBJECTIVE BOX
HPI:  MARK ANTHONY QUINTANA is a 55M with PMHx of HTN, DM, HLD, ,depression, and prior CVA with no residual deficits, who presented to [facility] on 06/11/2021 with several days of dysphagia and difficulty ambulating. Patient had been experiencing symptoms since 6/8 after coming home from a dental appointment. He was brought to the hospital after a wellness check from his insurance company. In the ED, his Utox tested positive for cocaine, though patient denied use, and CT head scan showed findings c/w an acute right corona radiata infarct, along with multiple b/l subacute infarcts. Patient was managed medically, as he was not a candidate for either tPA or thrombectomy.    Patient was evaluated by PM&R and therapy for functional deficits and gait/ ADL impairments and recommended acute rehabilitation. Patient was medically optimized for discharge to [facility] on 06/21/2021. Patient was seen and examined at the bedside upon arrival.  (21 Jun 2021 15:04)      PAST MEDICAL & SURGICAL HISTORY:  Cerebrovascular accident (CVA)    Hypertension    Hyperlipidemia    Diabetes mellitus    Depression        Subjective:  Did not sleep much last night due to disturbance from roommate.  fatigued this AM, but no pain.  no other complaints.        VITALS  Vital Signs Last 24 Hrs  T(C): 36.4 (07 Jul 2021 19:45), Max: 36.4 (07 Jul 2021 19:45)  T(F): 97.6 (07 Jul 2021 19:45), Max: 97.6 (07 Jul 2021 19:45)  HR: 68 (08 Jul 2021 08:29) (62 - 73)  BP: 147/78 (08 Jul 2021 08:29) (133/80 - 159/84)  BP(mean): --  RR: 16 (08 Jul 2021 08:29) (15 - 16)  SpO2: 97% (08 Jul 2021 08:29) (94% - 99%)    REVIEW OF SYMPTOMS  [Positive:  Neurological --cognitive deficits,   Fatigue      ----------------------------------------------------------------------  PHYSICAL EXAM:      Constitutional - NAD, Comfortable  HEENT - NCAT  Chest - CTA  Cardio - RRR  Abdomen -  Soft, NTND,   Extremities - No peripheral edema, No calf tenderness   Neurologic Exam:                    Cognitive -             Orientation: drowsy O x 4     Speech - (+) mild dysarthria,  No aphasia     Cranial Nerves - Right pupil sluggish, left pupil reactive, Visual fields slightly impaired on right, flattening of right NLF, EOMI, Shoulder shrug intact, +dysarthria,      Motor -                      LEFT    UE - ShAB 5/5, EF 5/5, EE 5/5, WE 5/5,  WNL                    RIGHT UE - ShAB 5/5, EF 5/5, EE 4/5, WE 5/5,  WNL                    LEFT    LE - HF 5/5, KE 5/5, DF 5/5, PF 5/5                    RIGHT LE - 5/5        Sensory - diminished sensation in dorsum and plantar aspect of right foot     Coordination - FTN intact & HTS intact  Psychiatric - mood stable, appropriate    RECENT LABS                        10.9   4.78  )-----------( 267      ( 08 Jul 2021 05:00 )             33.5     07-08    136  |  104  |  51<H>  ----------------------------<  165<H>  4.2   |  25  |  2.54<H>    Ca    8.2<L>      08 Jul 2021 05:00  Phos  4.3     07-07    TPro  6.5  /  Alb  2.5<L>  /  TBili  0.4  /  DBili  0.0  /  AST  34  /  ALT  38  /  AlkPhos  152<H>  07-08            RADIOLOGY/OTHER RESULTS      MEDICATIONS  (STANDING):  amLODIPine   Tablet 10 milliGRAM(s) Oral <User Schedule>  apixaban 2.5 milliGRAM(s) Oral every 12 hours  atorvastatin 80 milliGRAM(s) Oral at bedtime  citalopram 20 milliGRAM(s) Oral daily  dextrose 40% Gel 15 Gram(s) Oral once  dextrose 5%. 1000 milliLiter(s) (50 mL/Hr) IV Continuous <Continuous>  dextrose 5%. 1000 milliLiter(s) (100 mL/Hr) IV Continuous <Continuous>  dextrose 50% Injectable 25 Gram(s) IV Push once  dextrose 50% Injectable 12.5 Gram(s) IV Push once  dextrose 50% Injectable 25 Gram(s) IV Push once  gabapentin 600 milliGRAM(s) Oral at bedtime  glucagon  Injectable 1 milliGRAM(s) IntraMuscular once  hydrALAZINE 75 milliGRAM(s) Oral every 8 hours  insulin glargine Injectable (LANTUS) 8 Unit(s) SubCutaneous at bedtime  insulin lispro (ADMELOG) corrective regimen sliding scale   SubCutaneous three times a day before meals  insulin lispro (ADMELOG) corrective regimen sliding scale   SubCutaneous at bedtime  labetalol 100 milliGRAM(s) Oral two times a day  nicotine - 21 mG/24Hr(s) Patch 1 patch Transdermal daily  polyethylene glycol 3350 17 Gram(s) Oral daily  senna 2 Tablet(s) Oral at bedtime  traZODone 50 milliGRAM(s) Oral at bedtime    MEDICATIONS  (PRN):  acetaminophen    Suspension .. 650 milliGRAM(s) Oral every 6 hours PRN Temp greater or equal to 38C (100.4F), Mild Pain (1 - 3)  bisacodyl 5 milliGRAM(s) Oral every 12 hours PRN Constipation

## 2021-07-08 NOTE — PROGRESS NOTE ADULT - ASSESSMENT
· Assessment	  MARK ANTHONY QUINTANA is a 55M with PMHx of HTN, DM, HLD, ,depression, and prior CVA with no residual deifcits, who presented to [facility] on 06/11/2021 with dysphagia and difficulty ambulating, & dysarthria found to have an acute right corona radiata infarct. Admitted for multidisciplinary rehab program.      #Comprehensive Multidisciplinary Rehab Program:  - Gait, ADL, Functional impairments  - PT/OT/ SLP 3 hours a day 5 days a week  -recreation therapy     #Acute corona radiata infarct and multiple b/l subacute infarcts  - distribution of b/l subacute infarcts suggestive of cardioembolic origin  - not a candidate for tPA or thrombectomy  - c/w Eliquis 2.5mg q12h and atorvastatin 80mg qhs    #HLD  - c/w atorvastatin    #HTN  -- BP controlled  --cont. Labetalol 100mg q12h on 7/5  - cont. amlodipine 10mg in evening   - Hydralazine increased to 75 mg TID by nephrolology on 7/7  --hospitalist following      #Acute on chronic CKD  -  Cr stable  --nephrology following--  · Assessment	  MARK ANTHONY QUINTANA is a 55M with PMHx of HTN, DM, HLD, ,depression, and prior CVA with no residual deifcits, who presented to [facility] on 06/11/2021 with dysphagia and difficulty ambulating, & dysarthria found to have an acute right corona radiata infarct. Admitted for multidisciplinary rehab program.      #Comprehensive Multidisciplinary Rehab Program:  - Gait, ADL, Functional impairments  - PT/OT/ SLP 3 hours a day 5 days a week  -recreation therapy     #Acute corona radiata infarct and multiple b/l subacute infarcts  - distribution of b/l subacute infarcts suggestive of cardioembolic origin  - not a candidate for tPA or thrombectomy  - c/w Eliquis 2.5mg q12h and atorvastatin 80mg qhs    #HLD  - c/w atorvastatin    #HTN  -- BP improved today--cont. to monitor  --Labetalol increased to 100mg q12h on 7/5  - cont. amlodipine 10mg in evening   - Hydralazine increased to 50 mg TID on 7/6  --hospitalist following      #Acute on chronic CKD  -  Cr stable  --nephrology following-- note reviewed-- Avoid nephrotoxins  SONO w/o hydro  Renal diet  -- continue to encourage PO hydration      #DM  - FS and ISS  -Lantus 8 units  -- Adjustment as per hospitalist  - If DC home, will be on Glipizide 5mg daily and Sitagliptin 50mg daily (not metformin due to JOSE).  - If DC to [facility], can continue current hospital regimen    #Neuropathy  - c/w gabapentin 600mg qhs    #Tobacco use  - Nicotine 21mg/24hr    #Mood/Depression  - c/w Citalopram 20mg daily    #Sleep:  - Maintain quiet hours and low stim environment;  --nursing order to turn off TV at 9PM to encourage proper sleep hygiene  - cont.  trazodone    #Pain:  - Tylenol PRN  - avoid sedating meds that may affect cognitive recovery    #GI/Bowel:  - Senna 2 tabs qhs and Miralax PRN  - PO Dulcolax PRN    #/Bladder:  - continent      #Skin/ Pressure Injury Prevention:  - assessment on admission   - Turn Q2hrs in bed while awake, OOB to Chair, PT/OT/SLP     #DVT prophylaxis:  - Eliquis  - SCDs  - neg for b/l LE DVTs at [facility]    #Precautions/ Restrictions  - Falls  - Lungs: Aspiration,   - COVID PCR: neg 6/11    #Dispo: IDR 07/08/2021  - OT: supervision eating/grooming/UBD; CG toilet transfer/shower transfer; CG bathing; min A LBD  - PT: CG transfer, ambulation 120ft with RW--Min A with cane; CG 12 steps with 2 hand rails  - SLP: mod-severe cog deficits--impaired PS, Poor safety and insight, impaired comprehension, Impulsive; needs assist with money management and meds, impaired memory, visual deficits; (+) dysarthria; reg/thins  .  SW contacted pt's [facility] program --they are processing request to extend HHA hours. family cannot provide supervision   - TDD: DC  [facility] this week        --------------------------------------------  Outpatient Follow up:  Neurology - Dr. Nena Dumont on 07/22/2021 @ 11AM via telehealth  PCP  ----------------------------------------          -- continue to encourage PO hydration      #DM  - FS and ISS  -Lantus 8 units  -- Adjustment as per hospitalist  - If DC home, will be on Glipizide 5mg daily and Sitagliptin 50mg daily (not metformin due to JOSE).  - If DC to [facility], can continue current hospital regimen    #Neuropathy  - c/w gabapentin 600mg qhs    #Tobacco use  - Nicotine 21mg/24hr    #Mood/Depression  - c/w Citalopram 20mg daily    #Sleep:  - Maintain quiet hours and low stim environment;  --nursing order to turn off TV at 9PM to encourage proper sleep hygiene  - cont.  trazodone    #Pain:  - Tylenol PRN  - avoid sedating meds that may affect cognitive recovery    #GI/Bowel:  - Senna 2 tabs qhs and Miralax PRN  - PO Dulcolax PRN    #/Bladder:  - continent      #Skin/ Pressure Injury Prevention:  - assessment on admission   - Turn Q2hrs in bed while awake, OOB to Chair, PT/OT/SLP     #DVT prophylaxis:  - Eliquis  - SCDs  - neg for b/l LE DVTs at [facility]    #Precautions/ Restrictions  - Falls  - Lungs: Aspiration,   - COVID PCR: neg 6/11    #Dispo: IDR 07/01/2021  - OT: setup/supervision eating/grooming/UBD; min assist toilet transfer/LBD/shower; CG bathing  - PT: CG/min assist transfer; min assist ambulation 70ft with RW; min assist 8 steps with 2 hand rails  - SLP: mod-severe cog deficits--impaired PS, needs assist with money management and meds, impaired memory, +hs insight into deficits; (+) dysarthria; reg/thins  - Goals: Oralia transfers, supervision ambulation 150', supervision 12 steps, oralia eating/grooming/UBD, supervision LBD/bathing/functional transfers--Therapists to clarify if pt. can go home with Intermittent Supervision.  SW has had difficulty getting in touch with pt's [facility] program to extend HHA hours.  Will f/u  with niece regarding if family can provide supervision   - TDD: DC home vs [facility] 7/9        --------------------------------------------  Outpatient Follow up:  Neurology - Dr. Nena Dumont on 07/22/2021 @ 11AM via telehealth  PCP  ----------------------------------------

## 2021-07-08 NOTE — PROGRESS NOTE ADULT - SUBJECTIVE AND OBJECTIVE BOX
No distress    Vital Signs Last 24 Hrs  T(C): 36.4 (07-07-21 @ 19:45), Max: 36.4 (07-07-21 @ 19:45)  T(F): 97.6 (07-07-21 @ 19:45), Max: 97.6 (07-07-21 @ 19:45)  HR: 66 (07-08-21 @ 18:13) (63 - 73)  BP: 159/88 (07-08-21 @ 18:13) (133/80 - 159/88)  RR: 17 (07-08-21 @ 18:13) (15 - 17)  SpO2: 98% (07-08-21 @ 18:13) (94% - 99%)    I&O's Detail    07 Jul 2021 07:01  -  08 Jul 2021 07:00  --------------------------------------------------------  IN:    Oral Fluid: 620 mL  Total IN: 620 mL    OUT:    Voided (mL): 920 mL  Total OUT: 920 mL    Total NET: -300 mL    s1s2  b/l air entry  soft, ND  no edema                        10.9   4.78  )-----------( 267      ( 08 Jul 2021 05:00 )             33.5     08 Jul 2021 05:00    136    |  104    |  51     ----------------------------<  165    4.2     |  25     |  2.54     Ca    8.2        08 Jul 2021 05:00  Phos  4.3       07 Jul 2021 05:50    TPro  6.5    /  Alb  2.5    /  TBili  0.4    /  DBili  0.0    /  AST  34     /  ALT  38     /  AlkPhos  152    08 Jul 2021 05:00    LIVER FUNCTIONS - ( 08 Jul 2021 05:00 )  Alb: 2.5 g/dL / Pro: 6.5 g/dL / ALK PHOS: 152 U/L / ALT: 38 U/L / AST: 34 U/L / GGT: x           acetaminophen    Suspension .. 650 milliGRAM(s) Oral every 6 hours PRN  amLODIPine   Tablet 10 milliGRAM(s) Oral <User Schedule>  apixaban 2.5 milliGRAM(s) Oral every 12 hours  atorvastatin 80 milliGRAM(s) Oral at bedtime  bisacodyl 5 milliGRAM(s) Oral every 12 hours PRN  citalopram 20 milliGRAM(s) Oral daily  dextrose 40% Gel 15 Gram(s) Oral once  dextrose 5%. 1000 milliLiter(s) IV Continuous <Continuous>  dextrose 5%. 1000 milliLiter(s) IV Continuous <Continuous>  dextrose 50% Injectable 25 Gram(s) IV Push once  dextrose 50% Injectable 12.5 Gram(s) IV Push once  dextrose 50% Injectable 25 Gram(s) IV Push once  gabapentin 600 milliGRAM(s) Oral at bedtime  glucagon  Injectable 1 milliGRAM(s) IntraMuscular once  hydrALAZINE 75 milliGRAM(s) Oral every 8 hours  insulin glargine Injectable (LANTUS) 8 Unit(s) SubCutaneous at bedtime  insulin lispro (ADMELOG) corrective regimen sliding scale   SubCutaneous three times a day before meals  insulin lispro (ADMELOG) corrective regimen sliding scale   SubCutaneous at bedtime  labetalol 100 milliGRAM(s) Oral two times a day  nicotine - 21 mG/24Hr(s) Patch 1 patch Transdermal daily  polyethylene glycol 3350 17 Gram(s) Oral daily  senna 2 Tablet(s) Oral at bedtime  traZODone 50 milliGRAM(s) Oral at bedtime    A/P:    DM/ischemic proteinuric CKD 3 (Cr 2.3 - 6/28/21)  Cr is fluctuating but overall stable  Avoid nephrotoxins  SONO w/o hydro  Renal diet  BMP in am  No NSIAD's  Renal f/u as op, d/w pt    [phone]

## 2021-07-09 LAB
ANION GAP SERPL CALC-SCNC: 7 MMOL/L — SIGNIFICANT CHANGE UP (ref 5–17)
BUN SERPL-MCNC: 51 MG/DL — HIGH (ref 7–23)
CALCIUM SERPL-MCNC: 8.4 MG/DL — SIGNIFICANT CHANGE UP (ref 8.4–10.5)
CHLORIDE SERPL-SCNC: 105 MMOL/L — SIGNIFICANT CHANGE UP (ref 96–108)
CO2 SERPL-SCNC: 25 MMOL/L — SIGNIFICANT CHANGE UP (ref 22–31)
CREAT SERPL-MCNC: 2.74 MG/DL — HIGH (ref 0.5–1.3)
GLUCOSE BLDC GLUCOMTR-MCNC: 129 MG/DL — HIGH (ref 70–99)
GLUCOSE BLDC GLUCOMTR-MCNC: 156 MG/DL — HIGH (ref 70–99)
GLUCOSE BLDC GLUCOMTR-MCNC: 251 MG/DL — HIGH (ref 70–99)
GLUCOSE BLDC GLUCOMTR-MCNC: 98 MG/DL — SIGNIFICANT CHANGE UP (ref 70–99)
GLUCOSE SERPL-MCNC: 95 MG/DL — SIGNIFICANT CHANGE UP (ref 70–99)
POTASSIUM SERPL-MCNC: 4.6 MMOL/L — SIGNIFICANT CHANGE UP (ref 3.5–5.3)
POTASSIUM SERPL-SCNC: 4.6 MMOL/L — SIGNIFICANT CHANGE UP (ref 3.5–5.3)
SODIUM SERPL-SCNC: 137 MMOL/L — SIGNIFICANT CHANGE UP (ref 135–145)

## 2021-07-09 PROCEDURE — 99232 SBSQ HOSP IP/OBS MODERATE 35: CPT | Mod: GC

## 2021-07-09 RX ADMIN — AMLODIPINE BESYLATE 10 MILLIGRAM(S): 2.5 TABLET ORAL at 18:09

## 2021-07-09 RX ADMIN — Medication 1: at 12:14

## 2021-07-09 RX ADMIN — Medication 3: at 17:08

## 2021-07-09 RX ADMIN — CITALOPRAM 20 MILLIGRAM(S): 10 TABLET, FILM COATED ORAL at 12:14

## 2021-07-09 RX ADMIN — Medication 50 MILLIGRAM(S): at 22:26

## 2021-07-09 RX ADMIN — ATORVASTATIN CALCIUM 80 MILLIGRAM(S): 80 TABLET, FILM COATED ORAL at 22:22

## 2021-07-09 RX ADMIN — Medication 75 MILLIGRAM(S): at 14:32

## 2021-07-09 RX ADMIN — APIXABAN 2.5 MILLIGRAM(S): 2.5 TABLET, FILM COATED ORAL at 18:09

## 2021-07-09 RX ADMIN — Medication 100 MILLIGRAM(S): at 05:10

## 2021-07-09 RX ADMIN — SENNA PLUS 2 TABLET(S): 8.6 TABLET ORAL at 22:21

## 2021-07-09 RX ADMIN — INSULIN GLARGINE 8 UNIT(S): 100 INJECTION, SOLUTION SUBCUTANEOUS at 22:26

## 2021-07-09 RX ADMIN — APIXABAN 2.5 MILLIGRAM(S): 2.5 TABLET, FILM COATED ORAL at 05:10

## 2021-07-09 RX ADMIN — Medication 75 MILLIGRAM(S): at 22:25

## 2021-07-09 RX ADMIN — GABAPENTIN 600 MILLIGRAM(S): 400 CAPSULE ORAL at 22:25

## 2021-07-09 RX ADMIN — Medication 100 MILLIGRAM(S): at 18:09

## 2021-07-09 RX ADMIN — Medication 75 MILLIGRAM(S): at 05:09

## 2021-07-09 NOTE — PROGRESS NOTE ADULT - SUBJECTIVE AND OBJECTIVE BOX
HPI:  MARK ANTHONY QUINTANA is a 55M with PMHx of HTN, DM, HLD, ,depression, and prior CVA with no residual deficits, who presented to [facility] on 06/11/2021 with several days of dysphagia and difficulty ambulating. Patient had been experiencing symptoms since 6/8 after coming home from a dental appointment. He was brought to the hospital after a wellness check from his insurance company. In the ED, his Utox tested positive for cocaine, though patient denied use, and CT head scan showed findings c/w an acute right corona radiata infarct, along with multiple b/l subacute infarcts. Patient was managed medically, as he was not a candidate for either tPA or thrombectomy.    Patient was evaluated by PM&R and therapy for functional deficits and gait/ ADL impairments and recommended acute rehabilitation. Patient was medically optimized for discharge to [facility] on 06/21/2021. Patient was seen and examined at the bedside upon arrival.  (21 Jun 2021 15:04)      PAST MEDICAL & SURGICAL HISTORY:  Cerebrovascular accident (CVA)    Hypertension    Hyperlipidemia    Diabetes mellitus    Depression    Subjective:    Patient seen and examined.  No events overnight.   Pt slept well overnight as per nursing note.   Denies any pain at this time.   Denies any-other complaints at this time.       ICU Vital Signs Last 24 Hrs  T(C): 36.8 (08 Jul 2021 21:28), Max: 36.8 (08 Jul 2021 21:28)  T(F): 98.3 (08 Jul 2021 21:28), Max: 98.3 (08 Jul 2021 21:28)  HR: 62 (09 Jul 2021 14:34) (62 - 80)  BP: 150/87 (09 Jul 2021 14:34) (137/71 - 159/88)  BP(mean): --  ABP: --  ABP(mean): --  RR: 16 (09 Jul 2021 14:34) (16 - 17)  SpO2: 98% (09 Jul 2021 14:34) (96% - 99%)      REVIEW OF SYMPTOMS  [Positive:  Neurological --cognitive deficits,   Fatigue      ----------------------------------------------------------------------  PHYSICAL EXAM:      Constitutional - NAD, Comfortable  HEENT - NCAT  Chest - CTA  Cardio - RRR  Abdomen -  Soft, NTND,   Extremities - No peripheral edema, No calf tenderness   Neurologic Exam:                    Cognitive -             Orientation: more alert & awake today.      Speech - (+) mild dysarthria,  No aphasia     Cranial Nerves - Right pupil sluggish, left pupil reactive, Visual fields slightly impaired on right, flattening of right NLF, EOMI, Shoulder shrug intact, +dysarthria,      Motor -                      LEFT    UE - ShAB 5/5, EF 5/5, EE 5/5, WE 5/5,  WNL                    RIGHT UE - ShAB 5/5, EF 5/5, EE 4/5, WE 5/5,  WNL                    LEFT    LE - HF 5/5, KE 5/5, DF 5/5, PF 5/5                    RIGHT LE - 5/5        Sensory - diminished sensation in dorsum and plantar aspect of right foot     Coordination - FTN intact & HTS intact  Psychiatric - mood stable, appropriate    RECENT LABS                        10.9   4.78  )-----------( 267      ( 08 Jul 2021 05:00 )             33.5     07-08    136  |  104  |  51<H>  ----------------------------<  165<H>  4.2   |  25  |  2.54<H>    Ca    8.2<L>      08 Jul 2021 05:00  Phos  4.3     07-07    TPro  6.5  /  Alb  2.5<L>  /  TBili  0.4  /  DBili  0.0  /  AST  34  /  ALT  38  /  AlkPhos  152<H>  07-08          RADIOLOGY/OTHER RESULTS      MEDICATIONS  (STANDING):  amLODIPine   Tablet 10 milliGRAM(s) Oral <User Schedule>  apixaban 2.5 milliGRAM(s) Oral every 12 hours  atorvastatin 80 milliGRAM(s) Oral at bedtime  citalopram 20 milliGRAM(s) Oral daily  dextrose 40% Gel 15 Gram(s) Oral once  dextrose 5%. 1000 milliLiter(s) (100 mL/Hr) IV Continuous <Continuous>  dextrose 5%. 1000 milliLiter(s) (50 mL/Hr) IV Continuous <Continuous>  dextrose 50% Injectable 25 Gram(s) IV Push once  dextrose 50% Injectable 12.5 Gram(s) IV Push once  dextrose 50% Injectable 25 Gram(s) IV Push once  gabapentin 600 milliGRAM(s) Oral at bedtime  glucagon  Injectable 1 milliGRAM(s) IntraMuscular once  hydrALAZINE 75 milliGRAM(s) Oral every 8 hours  insulin glargine Injectable (LANTUS) 8 Unit(s) SubCutaneous at bedtime  insulin lispro (ADMELOG) corrective regimen sliding scale   SubCutaneous three times a day before meals  insulin lispro (ADMELOG) corrective regimen sliding scale   SubCutaneous at bedtime  labetalol 100 milliGRAM(s) Oral two times a day  nicotine - 21 mG/24Hr(s) Patch 1 patch Transdermal daily  polyethylene glycol 3350 17 Gram(s) Oral daily  senna 2 Tablet(s) Oral at bedtime  traZODone 50 milliGRAM(s) Oral at bedtime    MEDICATIONS  (PRN):  acetaminophen    Suspension .. 650 milliGRAM(s) Oral every 6 hours PRN Temp greater or equal to 38C (100.4F), Mild Pain (1 - 3)  bisacodyl 5 milliGRAM(s) Oral every 12 hours PRN Constipation

## 2021-07-09 NOTE — PROGRESS NOTE ADULT - SUBJECTIVE AND OBJECTIVE BOX
No distress    Vital Signs Last 24 Hrs  T(C): 36.8 (07-08-21 @ 21:28), Max: 36.8 (07-08-21 @ 21:28)  T(F): 98.3 (07-08-21 @ 21:28), Max: 98.3 (07-08-21 @ 21:28)  HR: 62 (07-09-21 @ 14:34) (62 - 80)  BP: 150/87 (07-09-21 @ 14:34) (137/71 - 159/88)  RR: 16 (07-09-21 @ 14:34) (16 - 17)  SpO2: 98% (07-09-21 @ 14:34) (96% - 98%)    I&O's Detail    08 Jul 2021 07:01  -  09 Jul 2021 07:00  --------------------------------------------------------  IN:    Oral Fluid: 600 mL  Total IN: 600 mL    OUT:    Voided (mL): 1460 mL  Total OUT: 1460 mL    Total NET: -860 mL    s1s2  b/l air entry  soft, ND  no edema                                10.9   4.78  )-----------( 267      ( 08 Jul 2021 05:00 )             33.5     09 Jul 2021 05:30    137    |  105    |  51     ----------------------------<  95     4.6     |  25     |  2.74     Ca    8.4        09 Jul 2021 05:30    TPro  6.5    /  Alb  2.5    /  TBili  0.4    /  DBili  0.0    /  AST  34     /  ALT  38     /  AlkPhos  152    08 Jul 2021 05:00    LIVER FUNCTIONS - ( 08 Jul 2021 05:00 )  Alb: 2.5 g/dL / Pro: 6.5 g/dL / ALK PHOS: 152 U/L / ALT: 38 U/L / AST: 34 U/L / GGT: x           acetaminophen    Suspension .. 650 milliGRAM(s) Oral every 6 hours PRN  amLODIPine   Tablet 10 milliGRAM(s) Oral <User Schedule>  apixaban 2.5 milliGRAM(s) Oral every 12 hours  atorvastatin 80 milliGRAM(s) Oral at bedtime  bisacodyl 5 milliGRAM(s) Oral every 12 hours PRN  citalopram 20 milliGRAM(s) Oral daily  dextrose 40% Gel 15 Gram(s) Oral once  dextrose 5%. 1000 milliLiter(s) IV Continuous <Continuous>  dextrose 5%. 1000 milliLiter(s) IV Continuous <Continuous>  dextrose 50% Injectable 25 Gram(s) IV Push once  dextrose 50% Injectable 12.5 Gram(s) IV Push once  dextrose 50% Injectable 25 Gram(s) IV Push once  gabapentin 600 milliGRAM(s) Oral at bedtime  glucagon  Injectable 1 milliGRAM(s) IntraMuscular once  hydrALAZINE 75 milliGRAM(s) Oral every 8 hours  insulin glargine Injectable (LANTUS) 8 Unit(s) SubCutaneous at bedtime  insulin lispro (ADMELOG) corrective regimen sliding scale   SubCutaneous three times a day before meals  insulin lispro (ADMELOG) corrective regimen sliding scale   SubCutaneous at bedtime  labetalol 100 milliGRAM(s) Oral two times a day  nicotine - 21 mG/24Hr(s) Patch 1 patch Transdermal daily  polyethylene glycol 3350 17 Gram(s) Oral daily  senna 2 Tablet(s) Oral at bedtime  traZODone 50 milliGRAM(s) Oral at bedtime    A/P:    DM/ischemic proteinuric CKD 3 (Cr 2.3 - 6/28/21)  Cr is fluctuating but overall stable  Avoid nephrotoxins  SONO w/o hydro  Renal diet  F/u BMP  No NSIAD's  Renal f/u as op    [phone]

## 2021-07-09 NOTE — PROGRESS NOTE ADULT - ASSESSMENT
· Assessment	  MARK ANTHONY QUINTANA is a 55M with PMHx of HTN, DM, HLD, ,depression, and prior CVA with no residual deifcits, who presented to [facility] on 06/11/2021 with dysphagia and difficulty ambulating, & dysarthria found to have an acute right corona radiata infarct. Admitted for multidisciplinary rehab program.      #Comprehensive Multidisciplinary Rehab Program:  - Gait, ADL, Functional impairments  - PT/OT/ SLP 3 hours a day 5 days a week  -recreation therapy     #Acute corona radiata infarct and multiple b/l subacute infarcts  - distribution of b/l subacute infarcts suggestive of cardioembolic origin  - not a candidate for tPA or thrombectomy  - c/w Eliquis 2.5mg q12h and atorvastatin 80mg qhs    #HLD  - c/w atorvastatin    #HTN  -- BP controlled  --cont. Labetalol 100mg q12h on 7/5  - cont. amlodipine 10mg in evening   - Hydralazine increased to 75 mg TID by nephrolology on 7/7  --hospitalist following    #Acute on chronic CKD  -  Cr stable  --nephrology following-- note reviewed-- Avoid nephrotoxins  SONO w/o hydro  Renal diet  -- continue to encourage PO hydration      #DM  - FS and ISS  -Lantus 8 units  -- Adjustment as per hospitalist  - If DC home, will be on Glipizide 5mg daily and Sitagliptin 50mg daily (not metformin due to JOSE).  - If DC to [facility], can continue current hospital regimen    #Neuropathy  - c/w gabapentin 600mg qhs    #Tobacco use  - Nicotine 21mg/24hr    #Mood/Depression  - c/w Citalopram 20mg daily    #Sleep:  - Maintain quiet hours and low stim environment;  --nursing order to turn off TV at 9PM to encourage proper sleep hygiene  - cont.  trazodone    #Pain:  - Tylenol PRN  - avoid sedating meds that may affect cognitive recovery    #GI/Bowel:  - Senna 2 tabs qhs and Miralax PRN  - PO Dulcolax PRN    #/Bladder:  - continent      #Skin/ Pressure Injury Prevention:  - assessment on admission   - Turn Q2hrs in bed while awake, OOB to Chair, PT/OT/SLP     #DVT prophylaxis:  - Eliquis  - SCDs  - neg for b/l LE DVTs at [facility]    #Precautions/ Restrictions  - Falls  - Lungs: Aspiration,   - COVID PCR: neg 6/11    #Dispo: IDR 07/08/2021  - OT: supervision eating/grooming/UBD; CG toilet transfer/shower transfer; CG bathing; min A LBD  - PT: CG transfer, ambulation 120ft with RW--Min A with cane; CG 12 steps with 2 hand rails  - SLP: mod-severe cog deficits--impaired PS, Poor safety and insight, impaired comprehension, Impulsive; needs assist with money management and meds, impaired memory, visual deficits; (+) dysarthria; reg/thins  .  SW contacted pt's [facility] program --they are processing request to extend HHA hours. family cannot provide supervision   - TDD: AISHA MAURICE this week    --------------------------------------------  Outpatient Follow up:  Neurology - Dr. Nena Dumont on 07/22/2021 @ 11AM via telehealth  PCP  ----------------------------------------

## 2021-07-10 LAB
ANION GAP SERPL CALC-SCNC: 5 MMOL/L — SIGNIFICANT CHANGE UP (ref 5–17)
BUN SERPL-MCNC: 49 MG/DL — HIGH (ref 7–23)
CALCIUM SERPL-MCNC: 8.3 MG/DL — LOW (ref 8.4–10.5)
CHLORIDE SERPL-SCNC: 105 MMOL/L — SIGNIFICANT CHANGE UP (ref 96–108)
CO2 SERPL-SCNC: 27 MMOL/L — SIGNIFICANT CHANGE UP (ref 22–31)
CREAT SERPL-MCNC: 2.72 MG/DL — HIGH (ref 0.5–1.3)
GLUCOSE BLDC GLUCOMTR-MCNC: 101 MG/DL — HIGH (ref 70–99)
GLUCOSE BLDC GLUCOMTR-MCNC: 126 MG/DL — HIGH (ref 70–99)
GLUCOSE BLDC GLUCOMTR-MCNC: 158 MG/DL — HIGH (ref 70–99)
GLUCOSE BLDC GLUCOMTR-MCNC: 227 MG/DL — HIGH (ref 70–99)
GLUCOSE SERPL-MCNC: 113 MG/DL — HIGH (ref 70–99)
POTASSIUM SERPL-MCNC: 4.3 MMOL/L — SIGNIFICANT CHANGE UP (ref 3.5–5.3)
POTASSIUM SERPL-SCNC: 4.3 MMOL/L — SIGNIFICANT CHANGE UP (ref 3.5–5.3)
SODIUM SERPL-SCNC: 137 MMOL/L — SIGNIFICANT CHANGE UP (ref 135–145)

## 2021-07-10 PROCEDURE — 99232 SBSQ HOSP IP/OBS MODERATE 35: CPT

## 2021-07-10 RX ADMIN — Medication 75 MILLIGRAM(S): at 13:44

## 2021-07-10 RX ADMIN — Medication 75 MILLIGRAM(S): at 21:22

## 2021-07-10 RX ADMIN — ATORVASTATIN CALCIUM 80 MILLIGRAM(S): 80 TABLET, FILM COATED ORAL at 21:22

## 2021-07-10 RX ADMIN — AMLODIPINE BESYLATE 10 MILLIGRAM(S): 2.5 TABLET ORAL at 17:31

## 2021-07-10 RX ADMIN — INSULIN GLARGINE 8 UNIT(S): 100 INJECTION, SOLUTION SUBCUTANEOUS at 21:23

## 2021-07-10 RX ADMIN — Medication 100 MILLIGRAM(S): at 05:07

## 2021-07-10 RX ADMIN — CITALOPRAM 20 MILLIGRAM(S): 10 TABLET, FILM COATED ORAL at 11:47

## 2021-07-10 RX ADMIN — Medication 75 MILLIGRAM(S): at 05:07

## 2021-07-10 RX ADMIN — GABAPENTIN 600 MILLIGRAM(S): 400 CAPSULE ORAL at 21:21

## 2021-07-10 RX ADMIN — Medication 50 MILLIGRAM(S): at 21:22

## 2021-07-10 RX ADMIN — SENNA PLUS 2 TABLET(S): 8.6 TABLET ORAL at 21:22

## 2021-07-10 RX ADMIN — Medication 1 PATCH: at 21:26

## 2021-07-10 RX ADMIN — Medication 1: at 16:59

## 2021-07-10 RX ADMIN — Medication 100 MILLIGRAM(S): at 17:31

## 2021-07-10 RX ADMIN — APIXABAN 2.5 MILLIGRAM(S): 2.5 TABLET, FILM COATED ORAL at 05:06

## 2021-07-10 RX ADMIN — APIXABAN 2.5 MILLIGRAM(S): 2.5 TABLET, FILM COATED ORAL at 17:31

## 2021-07-10 RX ADMIN — POLYETHYLENE GLYCOL 3350 17 GRAM(S): 17 POWDER, FOR SOLUTION ORAL at 11:47

## 2021-07-10 RX ADMIN — Medication 1 PATCH: at 11:46

## 2021-07-10 NOTE — PROGRESS NOTE ADULT - ASSESSMENT
54 y/o M with PMHx of HTN, T2DM, HLD, depression, and prior CVA with no residual deficits, who presented to [facility] on 06/11/2021 with dysphagia and difficulty ambulating; found to have an acute right corona radiata infarct. Admitted for multidisciplinary rehab program.    #Acute corona radiata infarct and multiple b/l subacute infarcts  -Continue comprehensive rehab program - PT/OT/SLP per rehab team  -Eliquis 2.5mg q12h and atorvastatin 80mg qhs    #JOSE, baseline Cr unknown - probably CKD3: creatinine improving  #Hyperkalemia: improved  -Encourage PO hydration  -Avoid nephrotoxins, monitor bmp   -Renal sono reviewed - no b/l hydro, renal mass, or calculi visualized     #HLD  #HTN  -target <140/90  -Continue Hydralazine 75mg TID, labetalol 100mg BID, amlodipine 10mg qd  -Lipitor 80mg qhs    #T2DM, A1C 5.9 - controlled  -FS and ISS  - home med: Metformin 500mg BID, Glipizide 5mg daily, and Sitagliptin 50mg daily  -Lantus 8un qhs while in rehab  -DC home with Sitagliptin and Glipizide. metformin dced due to JOSE/CKD with close PCP follow up and consistent carb diet. if going to SNF, can dc with lantus 8, with home meds documented on DC care plan.     #Neuropathy  -Gabapentin 600mg qhs    #Chronic Tobacco use  -Nicotine 21mg/24hr  -Smoking cessation counseling provided    #Mood/Depression  -Citalopram 20mg qd    #Moderate protein-calorie malnutrition   -Dietary/nutrition appreciated     #DVT ppx - Eliquis  -neg for b/l LE DVTs at [facility]

## 2021-07-10 NOTE — PROGRESS NOTE ADULT - SUBJECTIVE AND OBJECTIVE BOX
Patient is a 55y old  Male who presents with a chief complaint of Acute right corona radiata CVA (10 Jul 2021 12:40)      Patient seen and examined at bedside. doing well. more energetic. no acute medical complaints.    ALLERGIES:  No Known Allergies    MEDICATIONS  (STANDING):  amLODIPine   Tablet 10 milliGRAM(s) Oral <User Schedule>  apixaban 2.5 milliGRAM(s) Oral every 12 hours  atorvastatin 80 milliGRAM(s) Oral at bedtime  citalopram 20 milliGRAM(s) Oral daily  dextrose 40% Gel 15 Gram(s) Oral once  dextrose 5%. 1000 milliLiter(s) (50 mL/Hr) IV Continuous <Continuous>  dextrose 5%. 1000 milliLiter(s) (100 mL/Hr) IV Continuous <Continuous>  dextrose 50% Injectable 25 Gram(s) IV Push once  dextrose 50% Injectable 12.5 Gram(s) IV Push once  dextrose 50% Injectable 25 Gram(s) IV Push once  gabapentin 600 milliGRAM(s) Oral at bedtime  glucagon  Injectable 1 milliGRAM(s) IntraMuscular once  hydrALAZINE 75 milliGRAM(s) Oral every 8 hours  insulin glargine Injectable (LANTUS) 8 Unit(s) SubCutaneous at bedtime  insulin lispro (ADMELOG) corrective regimen sliding scale   SubCutaneous three times a day before meals  insulin lispro (ADMELOG) corrective regimen sliding scale   SubCutaneous at bedtime  labetalol 100 milliGRAM(s) Oral two times a day  nicotine - 21 mG/24Hr(s) Patch 1 patch Transdermal daily  polyethylene glycol 3350 17 Gram(s) Oral daily  senna 2 Tablet(s) Oral at bedtime  traZODone 50 milliGRAM(s) Oral at bedtime    MEDICATIONS  (PRN):  acetaminophen    Suspension .. 650 milliGRAM(s) Oral every 6 hours PRN Temp greater or equal to 38C (100.4F), Mild Pain (1 - 3)  bisacodyl 5 milliGRAM(s) Oral every 12 hours PRN Constipation    Vital Signs Last 24 Hrs  T(F): 97.6 (10 Jul 2021 07:43), Max: 98.2 (09 Jul 2021 22:22)  HR: 66 (10 Jul 2021 07:43) (62 - 80)  BP: 150/90 (10 Jul 2021 07:43) (129/80 - 161/88)  RR: 14 (10 Jul 2021 07:43) (14 - 16)  SpO2: 96% (10 Jul 2021 07:43) (96% - 98%)  I&O's Summary    09 Jul 2021 07:01  -  10 Jul 2021 07:00  --------------------------------------------------------  IN: 0 mL / OUT: 700 mL / NET: -700 mL    10 Jul 2021 07:01  -  10 Jul 2021 13:40  --------------------------------------------------------  IN: 240 mL / OUT: 200 mL / NET: 40 mL    PHYSICAL EXAM:  General: NAD, A/O x 3  ENT: MMM, no scleral icterus  Neck: Supple, No JVD  Lungs: Respirations unlabored. Clear to auscultation bilaterally, no wheezes, rales, rhonchi  Cardio: RRR, S1/S2  Abdomen: Soft, Nontender, Nondistended; Bowel sounds present  Extremities: No calf tenderness, No pitting edema LE b/l    LABS:                        10.9   4.78  )-----------( 267      ( 08 Jul 2021 05:00 )             33.5       07-10    137  |  105  |  49  ----------------------------<  113  4.3   |  27  |  2.72    Ca    8.3      10 Jul 2021 05:00    TPro  6.5  /  Alb  2.5  /  TBili  0.4  /  DBili  0.0  /  AST  34  /  ALT  38  /  AlkPhos  152  07-08     eGFR if Non African American: 25 mL/min/1.73M2 (07-10-21 @ 05:00)  eGFR if African American: 29 mL/min/1.73M2 (07-10-21 @ 05:00)    POCT Blood Glucose.: 126 mg/dL (10 Jul 2021 11:45)  POCT Blood Glucose.: 101 mg/dL (10 Jul 2021 07:42)  POCT Blood Glucose.: 129 mg/dL (09 Jul 2021 22:19)  POCT Blood Glucose.: 251 mg/dL (09 Jul 2021 17:06)    COVID-19 PCR: NotDetec (07-07-21 @ 21:00)  COVID-19 PCR: NotDetec (06-21-21 @ 23:30)    RADIOLOGY & ADDITIONAL TESTS: reviewed     Care Discussed with Consultants/Other Providers: yes - rehab

## 2021-07-10 NOTE — PROGRESS NOTE ADULT - SUBJECTIVE AND OBJECTIVE BOX
No distress    Vital Signs Last 24 Hrs  T(C): 37 (07-10-21 @ 21:20), Max: 37 (07-10-21 @ 21:20)  T(F): 98.6 (07-10-21 @ 21:20), Max: 98.6 (07-10-21 @ 21:20)  HR: 72 (07-10-21 @ 21:20) (66 - 72)  BP: 150/80 (07-10-21 @ 21:20) (129/80 - 150/90)  RR: 15 (07-10-21 @ 21:20) (14 - 15)  SpO2: 94% (07-10-21 @ 21:20) (94% - 96%)    I&O's Detail    09 Jul 2021 07:01  -  10 Jul 2021 07:00  --------------------------------------------------------  OUT:    Voided (mL): 700 mL  Total OUT: 700 mL    10 Jul 2021 07:01  -  10 Jul 2021 23:04  --------------------------------------------------------  IN:    Oral Fluid: 720 mL  Total IN: 720 mL    OUT:    Voided (mL): 1150 mL  Total OUT: 1150 mL    Total NET: -430 mL    s1s2  b/l air entry  soft, ND  no edema                   10 Jul 2021 05:00    137    |  105    |  49     ----------------------------<  113    4.3     |  27     |  2.72     Ca    8.3        10 Jul 2021 05:00    acetaminophen    Suspension .. 650 milliGRAM(s) Oral every 6 hours PRN  amLODIPine   Tablet 10 milliGRAM(s) Oral <User Schedule>  apixaban 2.5 milliGRAM(s) Oral every 12 hours  atorvastatin 80 milliGRAM(s) Oral at bedtime  bisacodyl 5 milliGRAM(s) Oral every 12 hours PRN  citalopram 20 milliGRAM(s) Oral daily  dextrose 40% Gel 15 Gram(s) Oral once  dextrose 5%. 1000 milliLiter(s) IV Continuous <Continuous>  dextrose 5%. 1000 milliLiter(s) IV Continuous <Continuous>  dextrose 50% Injectable 25 Gram(s) IV Push once  dextrose 50% Injectable 12.5 Gram(s) IV Push once  dextrose 50% Injectable 25 Gram(s) IV Push once  gabapentin 600 milliGRAM(s) Oral at bedtime  glucagon  Injectable 1 milliGRAM(s) IntraMuscular once  hydrALAZINE 75 milliGRAM(s) Oral every 8 hours  insulin glargine Injectable (LANTUS) 8 Unit(s) SubCutaneous at bedtime  insulin lispro (ADMELOG) corrective regimen sliding scale   SubCutaneous at bedtime  insulin lispro (ADMELOG) corrective regimen sliding scale   SubCutaneous three times a day before meals  labetalol 100 milliGRAM(s) Oral two times a day  nicotine - 21 mG/24Hr(s) Patch 1 patch Transdermal daily  polyethylene glycol 3350 17 Gram(s) Oral daily  senna 2 Tablet(s) Oral at bedtime  traZODone 50 milliGRAM(s) Oral at bedtime    A/P:    DM/ischemic proteinuric CKD 3 (Cr 2.3 - 6/28/21)  Cr is fluctuating but overall stable  Avoid nephrotoxins  SONO w/o hydro  Renal diet  F/u BMP  No NSIAD's  Renal f/u as op    [phone]

## 2021-07-10 NOTE — PROGRESS NOTE ADULT - SUBJECTIVE AND OBJECTIVE BOX
Chief complaint: no new complaints      Patient is a 55y old  Male who presents with a chief complaint of Acute right corona radiata CVA (09 Jul 2021 17:45)              PAST MEDICAL & SURGICAL HISTORY:  Cerebrovascular accident (CVA)    Hypertension    Hyperlipidemia    Diabetes mellitus    Depression      VITALS  Vital Signs Last 24 Hrs  T(C): 36.4 (10 Jul 2021 07:43), Max: 36.8 (09 Jul 2021 22:22)  T(F): 97.6 (10 Jul 2021 07:43), Max: 98.2 (09 Jul 2021 22:22)  HR: 66 (10 Jul 2021 07:43) (62 - 80)  BP: 150/90 (10 Jul 2021 07:43) (129/80 - 161/88)  BP(mean): --  RR: 14 (10 Jul 2021 07:43) (14 - 16)  SpO2: 96% (10 Jul 2021 07:43) (96% - 98%)      PHYSICAL EXAM  Constitutional - NAD, Comfortable  HEENT - NCAT, EOMI  Neck - Supple, No limited ROM  Chest - CTA bilaterally  Cardiovascular - RRR, S1S2,   Abdomen - Soft, NTND  Extremities -No calf tenderness   Neurologic Exam -                    Cognitive - Awake, Alert     No new focal deficits              RECENT LABS    07-10    137  |  105  |  49<H>  ----------------------------<  113<H>  4.3   |  27  |  2.72<H>    Ca    8.3<L>      10 Jul 2021 05:00                    CURRENT MEDICATIONS    MEDICATIONS  (STANDING):  amLODIPine   Tablet 10 milliGRAM(s) Oral <User Schedule>  apixaban 2.5 milliGRAM(s) Oral every 12 hours  atorvastatin 80 milliGRAM(s) Oral at bedtime  citalopram 20 milliGRAM(s) Oral daily  dextrose 40% Gel 15 Gram(s) Oral once  dextrose 5%. 1000 milliLiter(s) (50 mL/Hr) IV Continuous <Continuous>  dextrose 5%. 1000 milliLiter(s) (100 mL/Hr) IV Continuous <Continuous>  dextrose 50% Injectable 25 Gram(s) IV Push once  dextrose 50% Injectable 12.5 Gram(s) IV Push once  dextrose 50% Injectable 25 Gram(s) IV Push once  gabapentin 600 milliGRAM(s) Oral at bedtime  glucagon  Injectable 1 milliGRAM(s) IntraMuscular once  hydrALAZINE 75 milliGRAM(s) Oral every 8 hours  insulin glargine Injectable (LANTUS) 8 Unit(s) SubCutaneous at bedtime  insulin lispro (ADMELOG) corrective regimen sliding scale   SubCutaneous three times a day before meals  insulin lispro (ADMELOG) corrective regimen sliding scale   SubCutaneous at bedtime  labetalol 100 milliGRAM(s) Oral two times a day  nicotine - 21 mG/24Hr(s) Patch 1 patch Transdermal daily  polyethylene glycol 3350 17 Gram(s) Oral daily  senna 2 Tablet(s) Oral at bedtime  traZODone 50 milliGRAM(s) Oral at bedtime    MEDICATIONS  (PRN):  acetaminophen    Suspension .. 650 milliGRAM(s) Oral every 6 hours PRN Temp greater or equal to 38C (100.4F), Mild Pain (1 - 3)  bisacodyl 5 milliGRAM(s) Oral every 12 hours PRN Constipation    ASSESSMENT & PLAN          GI/Bowel Management - Dulcolax PRN, Fleet PRN   Management - Toilet Q2  Skin - Turn Q2  Pain - Tylenol PRN  DVT PPX - Apixaban       Continue comprehensive acute rehab program consisting of 3hrs/day of OT/PT and SLP.

## 2021-07-11 LAB
% ALBUMIN: 49.7 % — SIGNIFICANT CHANGE UP
% ALPHA 1: 6.1 % — SIGNIFICANT CHANGE UP
% ALPHA 2: 13 % — SIGNIFICANT CHANGE UP
% BETA: 13.1 % — SIGNIFICANT CHANGE UP
% GAMMA: 18.1 % — SIGNIFICANT CHANGE UP
ALBUMIN SERPL ELPH-MCNC: 2.8 G/DL — LOW (ref 3.6–5.5)
ALBUMIN/GLOB SERPL ELPH: 1 RATIO — SIGNIFICANT CHANGE UP
ALPHA1 GLOB SERPL ELPH-MCNC: 0.3 G/DL — SIGNIFICANT CHANGE UP (ref 0.1–0.4)
ALPHA2 GLOB SERPL ELPH-MCNC: 0.7 G/DL — SIGNIFICANT CHANGE UP (ref 0.5–1)
B-GLOBULIN SERPL ELPH-MCNC: 0.7 G/DL — SIGNIFICANT CHANGE UP (ref 0.5–1)
GAMMA GLOBULIN: 1 G/DL — SIGNIFICANT CHANGE UP (ref 0.6–1.6)
GLUCOSE BLDC GLUCOMTR-MCNC: 121 MG/DL — HIGH (ref 70–99)
GLUCOSE BLDC GLUCOMTR-MCNC: 157 MG/DL — HIGH (ref 70–99)
GLUCOSE BLDC GLUCOMTR-MCNC: 185 MG/DL — HIGH (ref 70–99)
GLUCOSE BLDC GLUCOMTR-MCNC: 203 MG/DL — HIGH (ref 70–99)
PROT PATTERN SERPL ELPH-IMP: SIGNIFICANT CHANGE UP

## 2021-07-11 PROCEDURE — 99232 SBSQ HOSP IP/OBS MODERATE 35: CPT

## 2021-07-11 RX ADMIN — Medication 75 MILLIGRAM(S): at 14:14

## 2021-07-11 RX ADMIN — APIXABAN 2.5 MILLIGRAM(S): 2.5 TABLET, FILM COATED ORAL at 17:44

## 2021-07-11 RX ADMIN — ATORVASTATIN CALCIUM 80 MILLIGRAM(S): 80 TABLET, FILM COATED ORAL at 21:19

## 2021-07-11 RX ADMIN — AMLODIPINE BESYLATE 10 MILLIGRAM(S): 2.5 TABLET ORAL at 17:43

## 2021-07-11 RX ADMIN — Medication 100 MILLIGRAM(S): at 05:28

## 2021-07-11 RX ADMIN — INSULIN GLARGINE 8 UNIT(S): 100 INJECTION, SOLUTION SUBCUTANEOUS at 21:19

## 2021-07-11 RX ADMIN — Medication 75 MILLIGRAM(S): at 21:20

## 2021-07-11 RX ADMIN — Medication 100 MILLIGRAM(S): at 17:43

## 2021-07-11 RX ADMIN — APIXABAN 2.5 MILLIGRAM(S): 2.5 TABLET, FILM COATED ORAL at 05:28

## 2021-07-11 RX ADMIN — Medication 2: at 11:37

## 2021-07-11 RX ADMIN — Medication 1: at 16:41

## 2021-07-11 RX ADMIN — Medication 1 PATCH: at 12:00

## 2021-07-11 RX ADMIN — Medication 1 PATCH: at 07:38

## 2021-07-11 RX ADMIN — GABAPENTIN 600 MILLIGRAM(S): 400 CAPSULE ORAL at 21:19

## 2021-07-11 RX ADMIN — Medication 50 MILLIGRAM(S): at 21:19

## 2021-07-11 RX ADMIN — CITALOPRAM 20 MILLIGRAM(S): 10 TABLET, FILM COATED ORAL at 11:34

## 2021-07-11 RX ADMIN — Medication 75 MILLIGRAM(S): at 05:29

## 2021-07-11 NOTE — PROGRESS NOTE ADULT - SUBJECTIVE AND OBJECTIVE BOX
Chief complaint: no new complaints     Patient is a 55y old  Male who presents with a chief complaint of Acute right corona radiata CVA (11 Jul 2021 10:39)  PAST MEDICAL & SURGICAL HISTORY:  Cerebrovascular accident (CVA)    Hypertension    Hyperlipidemia    Diabetes mellitus    Depression      VITALS  Vital Signs Last 24 Hrs  T(C): 36.6 (11 Jul 2021 07:42), Max: 37 (10 Jul 2021 21:20)  T(F): 97.8 (11 Jul 2021 07:42), Max: 98.6 (10 Jul 2021 21:20)  HR: 67 (11 Jul 2021 07:42) (67 - 78)  BP: 124/81 (11 Jul 2021 07:42) (124/81 - 150/80)  BP(mean): --  RR: 15 (11 Jul 2021 07:42) (15 - 15)  SpO2: 98% (11 Jul 2021 07:42) (94% - 98%)      PHYSICAL EXAM  Constitutional - NAD, Comfortable  HEENT - NCAT, EOMI  Neck - Supple, No limited ROM  Chest - CTA bilaterally  Cardiovascular - RRR, S1S2  Abdomen - BS+, Soft, NTND  Extremities - No calf tenderness   Neurologic Exam -                    Cognitive - Awake, Alert     No new focal deficits                RECENT LABS    07-10    137  |  105  |  49<H>  ----------------------------<  113<H>  4.3   |  27  |  2.72<H>    Ca    8.3<L>      10 Jul 2021 05:00              CURRENT MEDICATIONS    MEDICATIONS  (STANDING):  amLODIPine   Tablet 10 milliGRAM(s) Oral <User Schedule>  apixaban 2.5 milliGRAM(s) Oral every 12 hours  atorvastatin 80 milliGRAM(s) Oral at bedtime  citalopram 20 milliGRAM(s) Oral daily  dextrose 40% Gel 15 Gram(s) Oral once  dextrose 5%. 1000 milliLiter(s) (50 mL/Hr) IV Continuous <Continuous>  dextrose 5%. 1000 milliLiter(s) (100 mL/Hr) IV Continuous <Continuous>  dextrose 50% Injectable 25 Gram(s) IV Push once  dextrose 50% Injectable 12.5 Gram(s) IV Push once  dextrose 50% Injectable 25 Gram(s) IV Push once  gabapentin 600 milliGRAM(s) Oral at bedtime  glucagon  Injectable 1 milliGRAM(s) IntraMuscular once  hydrALAZINE 75 milliGRAM(s) Oral every 8 hours  insulin glargine Injectable (LANTUS) 8 Unit(s) SubCutaneous at bedtime  insulin lispro (ADMELOG) corrective regimen sliding scale   SubCutaneous three times a day before meals  insulin lispro (ADMELOG) corrective regimen sliding scale   SubCutaneous at bedtime  labetalol 100 milliGRAM(s) Oral two times a day  nicotine - 21 mG/24Hr(s) Patch 1 patch Transdermal daily  polyethylene glycol 3350 17 Gram(s) Oral daily  senna 2 Tablet(s) Oral at bedtime  traZODone 50 milliGRAM(s) Oral at bedtime    MEDICATIONS  (PRN):  acetaminophen    Suspension .. 650 milliGRAM(s) Oral every 6 hours PRN Temp greater or equal to 38C (100.4F), Mild Pain (1 - 3)  bisacodyl 5 milliGRAM(s) Oral every 12 hours PRN Constipation    ASSESSMENT & PLAN          GI/Bowel Management - Dulcolax PRN, Fleet PRN  Skin - Turn Q2  Pain - Tylenol PRN  DVT PPX - Apixaban       Continue comprehensive acute rehab program consisting of 3hrs/day of OT/PT and SLP.

## 2021-07-11 NOTE — PROGRESS NOTE ADULT - ASSESSMENT
56 y/o M with PMHx of HTN, T2DM, HLD, depression, and prior CVA with no residual deficits, who presented to [facility] on 06/11/2021 with dysphagia and difficulty ambulating; found to have an acute right corona radiata infarct. Admitted for multidisciplinary rehab program.    #Acute corona radiata infarct and multiple b/l subacute infarcts  -Continue comprehensive rehab program - PT/OT/SLP per rehab team  -Eliquis 2.5mg q12h and atorvastatin 80mg qhs    #JOSE, baseline Cr unknown - probably CKD3: creatinine improving  #Hyperkalemia: improved  -Encourage PO hydration  -Avoid nephrotoxins, monitor bmp   -Renal sono reviewed - no b/l hydro, renal mass, or calculi visualized     #HLD  #HTN  -target <140/90  -Continue Hydralazine 75mg TID, labetalol 100mg BID, amlodipine 10mg qd  -Lipitor 80mg qhs    #T2DM, A1C 5.9 - controlled  -FS and ISS  - home med: Metformin 500mg BID, Glipizide 5mg daily, and Sitagliptin 50mg daily  -Lantus 8un qhs while in rehab  -DC home with Sitagliptin and Glipizide. metformin dced due to JOSE/CKD with close PCP follow up and consistent carb diet. if going to SNF, can dc with lantus 8, with home meds documented on DC care plan.     #Neuropathy  -Gabapentin 600mg qhs    #Chronic Tobacco use  -Nicotine 21mg/24hr  -Smoking cessation counseling provided    #Mood/Depression  -Citalopram 20mg qd    #Moderate protein-calorie malnutrition   -Dietary/nutrition appreciated     #DVT ppx - Eliquis  -neg for b/l LE DVTs at [facility]

## 2021-07-11 NOTE — PROGRESS NOTE ADULT - SUBJECTIVE AND OBJECTIVE BOX
Patient is a 55y old  Male who presents with a chief complaint of Acute right corona radiata CVA (10 Jul 2021 12:40)      Patient seen and examined at bedside. doing well. no acute medical complaints. inquires about dispo planning.    ALLERGIES:  No Known Allergies    MEDICATIONS  (STANDING):  amLODIPine   Tablet 10 milliGRAM(s) Oral <User Schedule>  apixaban 2.5 milliGRAM(s) Oral every 12 hours  atorvastatin 80 milliGRAM(s) Oral at bedtime  citalopram 20 milliGRAM(s) Oral daily  dextrose 40% Gel 15 Gram(s) Oral once  dextrose 5%. 1000 milliLiter(s) (50 mL/Hr) IV Continuous <Continuous>  dextrose 5%. 1000 milliLiter(s) (100 mL/Hr) IV Continuous <Continuous>  dextrose 50% Injectable 25 Gram(s) IV Push once  dextrose 50% Injectable 12.5 Gram(s) IV Push once  dextrose 50% Injectable 25 Gram(s) IV Push once  gabapentin 600 milliGRAM(s) Oral at bedtime  glucagon  Injectable 1 milliGRAM(s) IntraMuscular once  hydrALAZINE 75 milliGRAM(s) Oral every 8 hours  insulin glargine Injectable (LANTUS) 8 Unit(s) SubCutaneous at bedtime  insulin lispro (ADMELOG) corrective regimen sliding scale   SubCutaneous three times a day before meals  insulin lispro (ADMELOG) corrective regimen sliding scale   SubCutaneous at bedtime  labetalol 100 milliGRAM(s) Oral two times a day  nicotine - 21 mG/24Hr(s) Patch 1 patch Transdermal daily  polyethylene glycol 3350 17 Gram(s) Oral daily  senna 2 Tablet(s) Oral at bedtime  traZODone 50 milliGRAM(s) Oral at bedtime    MEDICATIONS  (PRN):  acetaminophen    Suspension .. 650 milliGRAM(s) Oral every 6 hours PRN Temp greater or equal to 38C (100.4F), Mild Pain (1 - 3)  bisacodyl 5 milliGRAM(s) Oral every 12 hours PRN Constipation    Vital Signs Last 24 Hrs  T(C): 36.6 (11 Jul 2021 07:42), Max: 37 (10 Jul 2021 21:20)  T(F): 97.8 (11 Jul 2021 07:42), Max: 98.6 (10 Jul 2021 21:20)  HR: 67 (11 Jul 2021 07:42) (67 - 78)  BP: 124/81 (11 Jul 2021 07:42) (124/81 - 150/80)  BP(mean): --  RR: 15 (11 Jul 2021 07:42) (15 - 15)  SpO2: 98% (11 Jul 2021 07:42) (94% - 98%)  I&O's Summary    09 Jul 2021 07:01  -  10 Jul 2021 07:00  --------------------------------------------------------  IN: 0 mL / OUT: 700 mL / NET: -700 mL    10 Jul 2021 07:01  -  10 Jul 2021 13:40  --------------------------------------------------------  IN: 240 mL / OUT: 200 mL / NET: 40 mL    PHYSICAL EXAM:  General: NAD, A/O x 3  ENT: MMM, no scleral icterus  Neck: Supple, No JVD  Lungs: Respirations unlabored. Clear to auscultation bilaterally, no wheezes, rales, rhonchi  Cardio: RRR, S1/S2  Abdomen: Soft, Nontender, Nondistended; Bowel sounds present  Extremities: No calf tenderness, No pitting edema LE b/l    LABS:                        10.9   4.78  )-----------( 267      ( 08 Jul 2021 05:00 )             33.5       07-10    137  |  105  |  49  ----------------------------<  113  4.3   |  27  |  2.72    Ca    8.3      10 Jul 2021 05:00    TPro  6.5  /  Alb  2.5  /  TBili  0.4  /  DBili  0.0  /  AST  34  /  ALT  38  /  AlkPhos  152  07-08     eGFR if Non African American: 25 mL/min/1.73M2 (07-10-21 @ 05:00)  eGFR if African American: 29 mL/min/1.73M2 (07-10-21 @ 05:00)    POCT Blood Glucose.: 126 mg/dL (10 Jul 2021 11:45)  POCT Blood Glucose.: 101 mg/dL (10 Jul 2021 07:42)  POCT Blood Glucose.: 129 mg/dL (09 Jul 2021 22:19)  POCT Blood Glucose.: 251 mg/dL (09 Jul 2021 17:06)    COVID-19 PCR: NotDetec (07-07-21 @ 21:00)  COVID-19 PCR: NotDetec (06-21-21 @ 23:30)    RADIOLOGY & ADDITIONAL TESTS: reviewed     Care Discussed with Consultants/Other Providers: yes - rehab

## 2021-07-12 LAB
ALBUMIN SERPL ELPH-MCNC: 2.6 G/DL — LOW (ref 3.3–5)
ALP SERPL-CCNC: 152 U/L — HIGH (ref 40–120)
ALT FLD-CCNC: 46 U/L — HIGH (ref 10–45)
ANION GAP SERPL CALC-SCNC: 5 MMOL/L — SIGNIFICANT CHANGE UP (ref 5–17)
AST SERPL-CCNC: 43 U/L — HIGH (ref 10–40)
BILIRUB DIRECT SERPL-MCNC: 0.1 MG/DL — SIGNIFICANT CHANGE UP (ref 0–0.2)
BILIRUB INDIRECT FLD-MCNC: 0.4 MG/DL — SIGNIFICANT CHANGE UP (ref 0.2–1)
BILIRUB SERPL-MCNC: 0.5 MG/DL — SIGNIFICANT CHANGE UP (ref 0.2–1.2)
BUN SERPL-MCNC: 44 MG/DL — HIGH (ref 7–23)
CALCIUM SERPL-MCNC: 8.8 MG/DL — SIGNIFICANT CHANGE UP (ref 8.4–10.5)
CHLORIDE SERPL-SCNC: 108 MMOL/L — SIGNIFICANT CHANGE UP (ref 96–108)
CO2 SERPL-SCNC: 26 MMOL/L — SIGNIFICANT CHANGE UP (ref 22–31)
CREAT SERPL-MCNC: 2.71 MG/DL — HIGH (ref 0.5–1.3)
GLUCOSE BLDC GLUCOMTR-MCNC: 122 MG/DL — HIGH (ref 70–99)
GLUCOSE BLDC GLUCOMTR-MCNC: 156 MG/DL — HIGH (ref 70–99)
GLUCOSE BLDC GLUCOMTR-MCNC: 166 MG/DL — HIGH (ref 70–99)
GLUCOSE BLDC GLUCOMTR-MCNC: 220 MG/DL — HIGH (ref 70–99)
GLUCOSE SERPL-MCNC: 104 MG/DL — HIGH (ref 70–99)
HCT VFR BLD CALC: 32.4 % — LOW (ref 39–50)
HGB BLD-MCNC: 10.6 G/DL — LOW (ref 13–17)
MCHC RBC-ENTMCNC: 29.7 PG — SIGNIFICANT CHANGE UP (ref 27–34)
MCHC RBC-ENTMCNC: 32.7 GM/DL — SIGNIFICANT CHANGE UP (ref 32–36)
MCV RBC AUTO: 90.8 FL — SIGNIFICANT CHANGE UP (ref 80–100)
NRBC # BLD: 0 /100 WBCS — SIGNIFICANT CHANGE UP (ref 0–0)
PLATELET # BLD AUTO: 259 K/UL — SIGNIFICANT CHANGE UP (ref 150–400)
POTASSIUM SERPL-MCNC: 4.6 MMOL/L — SIGNIFICANT CHANGE UP (ref 3.5–5.3)
POTASSIUM SERPL-SCNC: 4.6 MMOL/L — SIGNIFICANT CHANGE UP (ref 3.5–5.3)
PROT SERPL-MCNC: 6.4 G/DL — SIGNIFICANT CHANGE UP (ref 6–8.3)
RBC # BLD: 3.57 M/UL — LOW (ref 4.2–5.8)
RBC # FLD: 11.1 % — SIGNIFICANT CHANGE UP (ref 10.3–14.5)
SODIUM SERPL-SCNC: 139 MMOL/L — SIGNIFICANT CHANGE UP (ref 135–145)
WBC # BLD: 5 K/UL — SIGNIFICANT CHANGE UP (ref 3.8–10.5)
WBC # FLD AUTO: 5 K/UL — SIGNIFICANT CHANGE UP (ref 3.8–10.5)

## 2021-07-12 PROCEDURE — 99232 SBSQ HOSP IP/OBS MODERATE 35: CPT

## 2021-07-12 PROCEDURE — 99233 SBSQ HOSP IP/OBS HIGH 50: CPT

## 2021-07-12 RX ORDER — ATORVASTATIN CALCIUM 80 MG/1
1 TABLET, FILM COATED ORAL
Qty: 30 | Refills: 0
Start: 2021-07-12

## 2021-07-12 RX ORDER — TRAZODONE HCL 50 MG
1 TABLET ORAL
Qty: 30 | Refills: 0
Start: 2021-07-12

## 2021-07-12 RX ORDER — CITALOPRAM 10 MG/1
1 TABLET, FILM COATED ORAL
Qty: 33 | Refills: 0
Start: 2021-07-12

## 2021-07-12 RX ORDER — SITAGLIPTIN 50 MG/1
1 TABLET, FILM COATED ORAL
Qty: 30 | Refills: 0
Start: 2021-07-12 | End: 2021-08-10

## 2021-07-12 RX ORDER — GABAPENTIN 400 MG/1
2 CAPSULE ORAL
Qty: 60 | Refills: 0
Start: 2021-07-12

## 2021-07-12 RX ORDER — APIXABAN 2.5 MG/1
1 TABLET, FILM COATED ORAL
Qty: 60 | Refills: 0
Start: 2021-07-12

## 2021-07-12 RX ADMIN — CITALOPRAM 20 MILLIGRAM(S): 10 TABLET, FILM COATED ORAL at 11:35

## 2021-07-12 RX ADMIN — APIXABAN 2.5 MILLIGRAM(S): 2.5 TABLET, FILM COATED ORAL at 05:29

## 2021-07-12 RX ADMIN — Medication 75 MILLIGRAM(S): at 14:55

## 2021-07-12 RX ADMIN — Medication 100 MILLIGRAM(S): at 05:29

## 2021-07-12 RX ADMIN — Medication 1: at 16:58

## 2021-07-12 RX ADMIN — Medication 100 MILLIGRAM(S): at 18:05

## 2021-07-12 RX ADMIN — Medication 2: at 11:36

## 2021-07-12 RX ADMIN — APIXABAN 2.5 MILLIGRAM(S): 2.5 TABLET, FILM COATED ORAL at 18:05

## 2021-07-12 RX ADMIN — Medication 75 MILLIGRAM(S): at 05:29

## 2021-07-12 RX ADMIN — Medication 50 MILLIGRAM(S): at 21:19

## 2021-07-12 RX ADMIN — ATORVASTATIN CALCIUM 80 MILLIGRAM(S): 80 TABLET, FILM COATED ORAL at 21:19

## 2021-07-12 RX ADMIN — POLYETHYLENE GLYCOL 3350 17 GRAM(S): 17 POWDER, FOR SOLUTION ORAL at 11:35

## 2021-07-12 RX ADMIN — Medication 100 MILLIGRAM(S): at 21:18

## 2021-07-12 RX ADMIN — GABAPENTIN 600 MILLIGRAM(S): 400 CAPSULE ORAL at 21:23

## 2021-07-12 RX ADMIN — INSULIN GLARGINE 8 UNIT(S): 100 INJECTION, SOLUTION SUBCUTANEOUS at 21:19

## 2021-07-12 RX ADMIN — AMLODIPINE BESYLATE 10 MILLIGRAM(S): 2.5 TABLET ORAL at 18:05

## 2021-07-12 RX ADMIN — Medication 75 MILLIGRAM(S): at 21:19

## 2021-07-12 NOTE — PROGRESS NOTE ADULT - ASSESSMENT
54 y/o M with PMHx of HTN, T2DM, HLD, depression, and prior CVA with no residual deficits, who presented to [facility] on 06/11/2021 with dysphagia and difficulty ambulating; found to have an acute right corona radiata infarct. Admitted for multidisciplinary rehab program- pt.ot.dvt ppx    #Acute corona radiata infarct and multiple b/l subacute infarcts  -Eliquis 2.5mg q12h and atorvastatin     #JOSE, baseline Cr unknown - probably CKD3: creatinine improving  #Hyperkalemia: improved  - Encourage PO hydration  - Avoid nephrotoxins, monitor bmp   -Renal sono reviewed - no b/l hydro, renal mass, or calculi visualized   - Monitor K levels  - encouraged PO hydration  - Urine Osm, Urine Sodium,  - UA: no infection or blood. proteinuria +  - Renal cx noted    #HTN  - target <140/90  -Continue Hydralazine , Labetalol, amlodipine    # HLD- Lipitor     #T2DM, A1C 5.9 - controlled  -FS and ISS  -  home med: Metformin 500mg BID, Glipizide 5mg daily, and Sitagliptin 50mg daily  - Lantus 8un qhs while in rehab  - DC home with Sitagliptin 25 qd,   -  metformin dced due to JOSE/CKD with close PCP follow up and consistent carb diet.  - likely d/c home    #Neuropathy  -Gabapentin    #Chronic Tobacco use  -Nicotine 21mg/24hr  -Smoking cessation counseling provided    #Mood/Depression  -Citalopram       #DVT ppx - Eliquis  -neg for b/l LE DVTs at [facility]    d/w Dr. Shane  will follow

## 2021-07-12 NOTE — PROGRESS NOTE ADULT - ASSESSMENT
· Assessment	  MARK ANTHONY QUINTANA is a 55M with PMHx of HTN, DM, HLD, ,depression, and prior CVA with no residual deifcits, who presented to [facility] on 06/11/2021 with dysphagia and difficulty ambulating, & dysarthria found to have an acute right corona radiata infarct. Admitted for multidisciplinary rehab program.      #Comprehensive Multidisciplinary Rehab Program:  - Gait, ADL, Functional impairments  - PT/OT/ SLP 3 hours a day 5 days a week  -recreation therapy     #Acute corona radiata infarct and multiple b/l subacute infarcts  - distribution of b/l subacute infarcts suggestive of cardioembolic origin  - not a candidate for tPA or thrombectomy  - c/w Eliquis 2.5mg q12h and atorvastatin 80mg qhs    #HLD  - c/w atorvastatin    #HTN  -- BP controlled -  target <140/90  --cont. Labetalol 100mg q12h on 7/5  - cont. amlodipine 10mg in evening   - Hydralazine increased to 75 mg TID by nephrolology on 7/7  --hospitalist following    #Acute on chronic CKD  -  Cr stable - - probably CKD3  --nephrology following-- note reviewed-- Avoid nephrotoxins  SONO w/o hydro  Renal diet  -- continue to encourage PO hydration    #DM  - FS and ISS  -Lantus 8 units  -- Adjustment as per hospitalist  - If DC home, will be on Glipizide 5mg daily and Sitagliptin 50mg daily (not metformin due to JOSE).  - If DC to [facility], can continue current hospital regimen    #Neuropathy  - c/w gabapentin 600mg qhs    #Tobacco use  - Nicotine 21mg/24hr    #Mood/Depression  - c/w Citalopram 20mg daily    #Sleep:  - Maintain quiet hours and low stim environment;  --nursing order to turn off TV at 9PM to encourage proper sleep hygiene  - cont.  trazodone    #Pain:  - Tylenol PRN  - avoid sedating meds that may affect cognitive recovery    #GI/Bowel:  - Senna 2 tabs qhs and Miralax PRN  - PO Dulcolax PRN    #/Bladder:  - continent      #Skin/ Pressure Injury Prevention:  - assessment on admission   - Turn Q2hrs in bed while awake, OOB to Chair, PT/OT/SLP     #DVT prophylaxis:  - Eliquis  - SCDs  - neg for b/l LE DVTs at [facility]    #Precautions/ Restrictions  - Falls  - Lungs: Aspiration,   - COVID PCR: neg 6/11    #Dispo: IDR 07/08/2021  - OT: supervision eating/grooming/UBD; CG toilet transfer/shower transfer; CG bathing; min A LBD  - PT: CG transfer, ambulation 120ft with RW--Min A with cane; CG 12 steps with 2 hand rails  - SLP: mod-severe cog deficits--impaired PS, Poor safety and insight, impaired comprehension, Impulsive; needs assist with money management and meds, impaired memory, visual deficits; (+) dysarthria; reg/thins  .  SW contacted pt's [facility] program --they are processing request to extend HHA hours. family cannot provide supervision   - TDD: AISHA MAURICE this week    --------------------------------------------  Outpatient Follow up:  Neurology - Dr. Nena Dumont on 07/22/2021 @ 11AM via telehealth  PCP  ----------------------------------------         · Assessment	  MARK ANTHONY QUINTANA is a 55M with PMHx of HTN, DM, HLD, ,depression, and prior CVA with no residual deifcits, who presented to [facility] on 06/11/2021 with dysphagia and difficulty ambulating, & dysarthria found to have an acute right corona radiata infarct. Admitted for multidisciplinary rehab program.      #Comprehensive Multidisciplinary Rehab Program:  - Gait, ADL, Functional impairments  - PT/OT/ SLP 3 hours a day 5 days a week  -recreation therapy     #Acute corona radiata infarct and multiple b/l subacute infarcts  - distribution of b/l subacute infarcts suggestive of cardioembolic origin  - not a candidate for tPA or thrombectomy  - c/w Eliquis 2.5mg q12h and atorvastatin 80mg qhs    #HLD  - c/w atorvastatin    #HTN  -- BP controlled -  target <140/90  --cont. Labetalol 100mg q12h on 7/5  - cont. amlodipine 10mg in evening   - Hydralazine increased to 75 mg TID by nephrolology on 7/7  --hospitalist following    #Acute on chronic CKD  -  Cr stable - - probably CKD3  --nephrology following-- note reviewed-- Avoid nephrotoxins  SONO w/o hydro  Renal diet  -- continue to encourage PO hydration    #DM  - FS and ISS  -Lantus 8 units  -- Adjustment as per hospitalist  - DC home on Sitagliptin 25mg daily (not metformin due to JOSE).      #Neuropathy  - c/w gabapentin 600mg qhs    #Tobacco use  - Nicotine 21mg/24hr    #Mood/Depression  - c/w Citalopram 20mg daily    #Sleep:  - Maintain quiet hours and low stim environment;  --nursing order to turn off TV at 9PM to encourage proper sleep hygiene  - cont.  trazodone    #Pain:  - Tylenol PRN  - avoid sedating meds that may affect cognitive recovery    #GI/Bowel:  - Senna 2 tabs qhs and Miralax PRN  - PO Dulcolax PRN    #/Bladder:  - continent      #Skin/ Pressure Injury Prevention:  - assessment on admission   - Turn Q2hrs in bed while awake, OOB to Chair, PT/OT/SLP     #DVT prophylaxis:  - Eliquis  - SCDs  - neg for b/l LE DVTs at [facility]    #Precautions/ Restrictions  - Falls  - Lungs: Aspiration,   - COVID PCR: neg 6/11    #Dispo: IDR 07/08/2021  - OT: supervision eating/grooming/UBD; CG toilet transfer/shower transfer; CG bathing; min A LBD  - PT: CG transfer, ambulation 120ft with RW--Min A with cane; CG 12 steps with 2 hand rails  - SLP: mod-severe cog deficits--impaired PS, Poor safety and insight, impaired comprehension, Impulsive; needs assist with money management and meds, impaired memory, visual deficits; (+) dysarthria; reg/thins  .  SW contacted pt's [facility] program --they are processing request to extend HHA hours. family cannot provide supervision   - TDD: DC  home once HHA hours approved    --------------------------------------------  Outpatient Follow up:  Neurology - Dr. Nena Dumont on 07/22/2021 @ 11AM via telehealth  PCP  ----------------------------------------

## 2021-07-12 NOTE — PROGRESS NOTE ADULT - SUBJECTIVE AND OBJECTIVE BOX
55y old  Male who presents with a chief complaint of Acute right corona radiata CVA     seen at the bedside no new complaints, no n/v, no sob.    Vital Signs Last 24 Hrs  T(C): 36.7 (12 Jul 2021 07:37), Max: 36.7 (12 Jul 2021 07:37)  T(F): 98.1 (12 Jul 2021 07:37), Max: 98.1 (12 Jul 2021 07:37)  HR: 63 (12 Jul 2021 07:37) (63 - 81)  BP: 140/84 (12 Jul 2021 07:37) (125/78 - 158/78)  BP(mean): --  RR: 16 (12 Jul 2021 07:37) (16 - 16)  SpO2: 97% (12 Jul 2021 07:37) (97% - 100%)      GENERAL- NAD  EAR/NOSE/MOUTH/THROAT - no pharyngeal exudates, no oral lesions  MMM  EYES- MAT, conjunctiva and Sclera clear  NECK- supple  RESPIRATORY-  clear to auscultation bilaterally, non laboured breathing  CARDIOVASCULAR - SIS2, RRR  GI - soft NT BS present  EXTREMITIES- no pedal edema  NEUROLOGY- RLE weakness, dysarthria  SKIN- no rashes, warm to touch  PSYCHIATRY- AAO X 3                10.6                 139  | 26   | 44           5.00  >-----------< 259     ------------------------< 104                   32.4                 4.6  | 108  | 2.71                                         Ca 8.8   Mg x     Ph x          CAPILLARY BLOOD GLUCOSE      POCT Blood Glucose.: 122 mg/dL (12 Jul 2021 07:36)  POCT Blood Glucose.: 185 mg/dL (11 Jul 2021 21:18)  POCT Blood Glucose.: 157 mg/dL (11 Jul 2021 16:40)  POCT Blood Glucose.: 203 mg/dL (11 Jul 2021 11:37)      MEDICATIONS  (STANDING):  amLODIPine   Tablet 10 milliGRAM(s) Oral <User Schedule>  apixaban 2.5 milliGRAM(s) Oral every 12 hours  atorvastatin 80 milliGRAM(s) Oral at bedtime  citalopram 20 milliGRAM(s) Oral daily  gabapentin 600 milliGRAM(s) Oral at bedtime  glucagon  Injectable 1 milliGRAM(s) IntraMuscular once  hydrALAZINE 75 milliGRAM(s) Oral every 8 hours  insulin glargine Injectable (LANTUS) 8 Unit(s) SubCutaneous at bedtime  insulin lispro (ADMELOG) corrective regimen sliding scale   SubCutaneous three times a day before meals  insulin lispro (ADMELOG) corrective regimen sliding scale   SubCutaneous at bedtime  labetalol 100 milliGRAM(s) Oral two times a day  polyethylene glycol 3350 17 Gram(s) Oral daily  senna 2 Tablet(s) Oral at bedtime  traZODone 50 milliGRAM(s) Oral at bedtime    MEDICATIONS  (PRN):  acetaminophen    Suspension .. 650 milliGRAM(s) Oral every 6 hours PRN Temp greater or equal to 38C (100.4F), Mild Pain (1 - 3)  bisacodyl 5 milliGRAM(s) Oral every 12 hours PRN Constipation   55y old  Male who presents with a chief complaint of Acute right corona radiata CVA     seen at the bedside no new complaints, no n/v, no sob.    Vital Signs Last 24 Hrs  T(C): 36.7 (12 Jul 2021 07:37), Max: 36.7 (12 Jul 2021 07:37)  T(F): 98.1 (12 Jul 2021 07:37), Max: 98.1 (12 Jul 2021 07:37)  HR: 63 (12 Jul 2021 07:37) (63 - 81)  BP: 140/84 (12 Jul 2021 07:37) (125/78 - 158/78)  BP(mean): --  RR: 16 (12 Jul 2021 07:37) (16 - 16)  SpO2: 97% (12 Jul 2021 07:37) (97% - 100%)      GENERAL- NAD  EAR/NOSE/MOUTH/THROAT - no pharyngeal exudates, no oral lesions  MMM  EYES- MAT, conjunctiva and Sclera clear  NECK- supple  RESPIRATORY-  clear to auscultation bilaterally, non laboured breathing  CARDIOVASCULAR - SIS2, RRR  GI - soft NT BS present  EXTREMITIES- right leg edema+  NEUROLOGY- RLE weakness, dysarthria  SKIN- no rashes, warm to touch  PSYCHIATRY- AAO X 3                10.6                 139  | 26   | 44           5.00  >-----------< 259     ------------------------< 104                   32.4                 4.6  | 108  | 2.71                                         Ca 8.8   Mg x     Ph x          CAPILLARY BLOOD GLUCOSE      POCT Blood Glucose.: 122 mg/dL (12 Jul 2021 07:36)  POCT Blood Glucose.: 185 mg/dL (11 Jul 2021 21:18)  POCT Blood Glucose.: 157 mg/dL (11 Jul 2021 16:40)  POCT Blood Glucose.: 203 mg/dL (11 Jul 2021 11:37)      MEDICATIONS  (STANDING):  amLODIPine   Tablet 10 milliGRAM(s) Oral <User Schedule>  apixaban 2.5 milliGRAM(s) Oral every 12 hours  atorvastatin 80 milliGRAM(s) Oral at bedtime  citalopram 20 milliGRAM(s) Oral daily  gabapentin 600 milliGRAM(s) Oral at bedtime  glucagon  Injectable 1 milliGRAM(s) IntraMuscular once  hydrALAZINE 75 milliGRAM(s) Oral every 8 hours  insulin glargine Injectable (LANTUS) 8 Unit(s) SubCutaneous at bedtime  insulin lispro (ADMELOG) corrective regimen sliding scale   SubCutaneous three times a day before meals  insulin lispro (ADMELOG) corrective regimen sliding scale   SubCutaneous at bedtime  labetalol 100 milliGRAM(s) Oral two times a day  polyethylene glycol 3350 17 Gram(s) Oral daily  senna 2 Tablet(s) Oral at bedtime  traZODone 50 milliGRAM(s) Oral at bedtime    MEDICATIONS  (PRN):  acetaminophen    Suspension .. 650 milliGRAM(s) Oral every 6 hours PRN Temp greater or equal to 38C (100.4F), Mild Pain (1 - 3)  bisacodyl 5 milliGRAM(s) Oral every 12 hours PRN Constipation

## 2021-07-12 NOTE — PROGRESS NOTE ADULT - SUBJECTIVE AND OBJECTIVE BOX
No distress    Vital Signs Last 24 Hrs  T(C): 36.7 (07-12-21 @ 07:37), Max: 36.7 (07-12-21 @ 07:37)  T(F): 98.1 (07-12-21 @ 07:37), Max: 98.1 (07-12-21 @ 07:37)  HR: 63 (07-12-21 @ 14:58) (63 - 81)  BP: 150/81 (07-12-21 @ 14:58) (140/84 - 158/78)  RR: 16 (07-12-21 @ 14:58) (16 - 16)  SpO2: 97% (07-12-21 @ 14:58) (97% - 100%)    I&O's Detail    11 Jul 2021 07:01  -  12 Jul 2021 07:00  --------------------------------------------------------  IN:    Oral Fluid: 1220 mL  Total IN: 1220 mL    OUT:    Voided (mL): 1550 mL  Total OUT: 1550 mL    Total NET: -330 mL    s1s2  b/l air entry  soft, ND  no edema                                       10.6   5.00  )-----------( 259      ( 12 Jul 2021 06:15 )             32.4     12 Jul 2021 06:15    139    |  108    |  44     ----------------------------<  104    4.6     |  26     |  2.71     Ca    8.8        12 Jul 2021 06:15    TPro  6.4    /  Alb  2.6    /  TBili  0.5    /  DBili  0.1    /  AST  43     /  ALT  46     /  AlkPhos  152    12 Jul 2021 06:15    LIVER FUNCTIONS - ( 12 Jul 2021 06:15 )  Alb: 2.6 g/dL / Pro: 6.4 g/dL / ALK PHOS: 152 U/L / ALT: 46 U/L / AST: 43 U/L / GGT: x           acetaminophen    Suspension .. 650 milliGRAM(s) Oral every 6 hours PRN  amLODIPine   Tablet 10 milliGRAM(s) Oral <User Schedule>  apixaban 2.5 milliGRAM(s) Oral every 12 hours  atorvastatin 80 milliGRAM(s) Oral at bedtime  bisacodyl 5 milliGRAM(s) Oral every 12 hours PRN  citalopram 20 milliGRAM(s) Oral daily  dextrose 40% Gel 15 Gram(s) Oral once  dextrose 5%. 1000 milliLiter(s) IV Continuous <Continuous>  dextrose 5%. 1000 milliLiter(s) IV Continuous <Continuous>  dextrose 50% Injectable 25 Gram(s) IV Push once  dextrose 50% Injectable 12.5 Gram(s) IV Push once  dextrose 50% Injectable 25 Gram(s) IV Push once  gabapentin 600 milliGRAM(s) Oral at bedtime  glucagon  Injectable 1 milliGRAM(s) IntraMuscular once  hydrALAZINE 75 milliGRAM(s) Oral every 8 hours  insulin glargine Injectable (LANTUS) 8 Unit(s) SubCutaneous at bedtime  insulin lispro (ADMELOG) corrective regimen sliding scale   SubCutaneous three times a day before meals  insulin lispro (ADMELOG) corrective regimen sliding scale   SubCutaneous at bedtime  labetalol 100 milliGRAM(s) Oral two times a day  polyethylene glycol 3350 17 Gram(s) Oral daily  senna 2 Tablet(s) Oral at bedtime  traZODone 50 milliGRAM(s) Oral at bedtime    A/P:    DM/ischemic proteinuric CKD 3 (Cr 2.3 - 6/28/21)  Cr is fluctuating but overall stable  Avoid nephrotoxins  SONO w/o hydro  Renal diet  F/u BMP  No NSIAD's  Renal f/u as op    [phone]

## 2021-07-12 NOTE — PROGRESS NOTE ADULT - SUBJECTIVE AND OBJECTIVE BOX
HPI:  MARK ANTHONY QUINTANA is a 55M with PMHx of HTN, DM, HLD, ,depression, and prior CVA with no residual deficits, who presented to [facility] on 06/11/2021 with several days of dysphagia and difficulty ambulating. Patient had been experiencing symptoms since 6/8 after coming home from a dental appointment. He was brought to the hospital after a wellness check from his insurance company. In the ED, his Utox tested positive for cocaine, though patient denied use, and CT head scan showed findings c/w an acute right corona radiata infarct, along with multiple b/l subacute infarcts. Patient was managed medically, as he was not a candidate for either tPA or thrombectomy.    Patient was evaluated by PM&R and therapy for functional deficits and gait/ ADL impairments and recommended acute rehabilitation. Patient was medically optimized for discharge to [facility] on 06/21/2021. Patient was seen and examined at the bedside upon arrival.  (21 Jun 2021 15:04)      PAST MEDICAL & SURGICAL HISTORY:  Cerebrovascular accident (CVA)    Hypertension    Hyperlipidemia    Diabetes mellitus    Depression    Subjective:    Patient seen and examined.  No events overnight.   Pt slept well overnight as per nursing note.   Denies any pain at this time.   Denies any-other complaints at this time.       ICU Vital Signs Last 24 Hrs  T(C): 36.7 (12 Jul 2021 07:37), Max: 36.7 (12 Jul 2021 07:37)  T(F): 98.1 (12 Jul 2021 07:37), Max: 98.1 (12 Jul 2021 07:37)  HR: 63 (12 Jul 2021 07:37) (63 - 81)  BP: 140/84 (12 Jul 2021 07:37) (125/78 - 158/78)  BP(mean): --  ABP: --  ABP(mean): --  RR: 16 (12 Jul 2021 07:37) (16 - 16)  SpO2: 97% (12 Jul 2021 07:37) (97% - 100%)      REVIEW OF SYMPTOMS  [Positive:  Neurological --cognitive deficits,   Fatigue      ----------------------------------------------------------------------  PHYSICAL EXAM:      Constitutional - NAD, Comfortable  HEENT - NCAT  Chest - CTA  Cardio - RRR  Abdomen -  Soft, NTND,   Extremities - No peripheral edema, No calf tenderness   Neurologic Exam:                    Cognitive -             Orientation: more alert & awake today.      Speech - (+) mild dysarthria,  No aphasia     Cranial Nerves - Right pupil sluggish, left pupil reactive, Visual fields slightly impaired on right, flattening of right NLF, EOMI, Shoulder shrug intact, +dysarthria,      Motor -                      LEFT    UE - ShAB 5/5, EF 5/5, EE 5/5, WE 5/5,  WNL                    RIGHT UE - ShAB 5/5, EF 5/5, EE 4/5, WE 5/5,  WNL                    LEFT    LE - HF 5/5, KE 5/5, DF 5/5, PF 5/5                    RIGHT LE - 5/5        Sensory - diminished sensation in dorsum and plantar aspect of right foot     Coordination - FTN intact & HTS intact  Psychiatric - mood stable, appropriate    RECENT LABS                                   10.6   5.00  )-----------( 259      ( 12 Jul 2021 06:15 )             32.4     07-12    139  |  108  |  44<H>  ----------------------------<  104<H>  4.6   |  26  |  2.71<H>    Ca    8.8      12 Jul 2021 06:15    TPro  6.4  /  Alb  2.6<L>  /  TBili  0.5  /  DBili  0.1  /  AST  43<H>  /  ALT  46<H>  /  AlkPhos  152<H>  07-12            RADIOLOGY/OTHER RESULTS      MEDICATIONS  (STANDING):  amLODIPine   Tablet 10 milliGRAM(s) Oral <User Schedule>  apixaban 2.5 milliGRAM(s) Oral every 12 hours  atorvastatin 80 milliGRAM(s) Oral at bedtime  citalopram 20 milliGRAM(s) Oral daily  dextrose 40% Gel 15 Gram(s) Oral once  dextrose 5%. 1000 milliLiter(s) (50 mL/Hr) IV Continuous <Continuous>  dextrose 5%. 1000 milliLiter(s) (100 mL/Hr) IV Continuous <Continuous>  dextrose 50% Injectable 25 Gram(s) IV Push once  dextrose 50% Injectable 12.5 Gram(s) IV Push once  dextrose 50% Injectable 25 Gram(s) IV Push once  gabapentin 600 milliGRAM(s) Oral at bedtime  glucagon  Injectable 1 milliGRAM(s) IntraMuscular once  hydrALAZINE 75 milliGRAM(s) Oral every 8 hours  insulin glargine Injectable (LANTUS) 8 Unit(s) SubCutaneous at bedtime  insulin lispro (ADMELOG) corrective regimen sliding scale   SubCutaneous three times a day before meals  insulin lispro (ADMELOG) corrective regimen sliding scale   SubCutaneous at bedtime  labetalol 100 milliGRAM(s) Oral two times a day  polyethylene glycol 3350 17 Gram(s) Oral daily  senna 2 Tablet(s) Oral at bedtime  traZODone 50 milliGRAM(s) Oral at bedtime    MEDICATIONS  (PRN):  acetaminophen    Suspension .. 650 milliGRAM(s) Oral every 6 hours PRN Temp greater or equal to 38C (100.4F), Mild Pain (1 - 3)  bisacodyl 5 milliGRAM(s) Oral every 12 hours PRN Constipation

## 2021-07-13 LAB
ANION GAP SERPL CALC-SCNC: 7 MMOL/L — SIGNIFICANT CHANGE UP (ref 5–17)
BUN SERPL-MCNC: 44 MG/DL — HIGH (ref 7–23)
CALCIUM SERPL-MCNC: 8.2 MG/DL — LOW (ref 8.4–10.5)
CHLORIDE SERPL-SCNC: 105 MMOL/L — SIGNIFICANT CHANGE UP (ref 96–108)
CO2 SERPL-SCNC: 24 MMOL/L — SIGNIFICANT CHANGE UP (ref 22–31)
CREAT SERPL-MCNC: 2.71 MG/DL — HIGH (ref 0.5–1.3)
GLUCOSE BLDC GLUCOMTR-MCNC: 125 MG/DL — HIGH (ref 70–99)
GLUCOSE BLDC GLUCOMTR-MCNC: 155 MG/DL — HIGH (ref 70–99)
GLUCOSE BLDC GLUCOMTR-MCNC: 165 MG/DL — HIGH (ref 70–99)
GLUCOSE BLDC GLUCOMTR-MCNC: 170 MG/DL — HIGH (ref 70–99)
GLUCOSE SERPL-MCNC: 106 MG/DL — HIGH (ref 70–99)
POTASSIUM SERPL-MCNC: 4.3 MMOL/L — SIGNIFICANT CHANGE UP (ref 3.5–5.3)
POTASSIUM SERPL-SCNC: 4.3 MMOL/L — SIGNIFICANT CHANGE UP (ref 3.5–5.3)
SODIUM SERPL-SCNC: 136 MMOL/L — SIGNIFICANT CHANGE UP (ref 135–145)

## 2021-07-13 PROCEDURE — 99232 SBSQ HOSP IP/OBS MODERATE 35: CPT

## 2021-07-13 PROCEDURE — 90832 PSYTX W PT 30 MINUTES: CPT

## 2021-07-13 RX ADMIN — APIXABAN 2.5 MILLIGRAM(S): 2.5 TABLET, FILM COATED ORAL at 18:04

## 2021-07-13 RX ADMIN — CITALOPRAM 20 MILLIGRAM(S): 10 TABLET, FILM COATED ORAL at 12:15

## 2021-07-13 RX ADMIN — Medication 1: at 17:08

## 2021-07-13 RX ADMIN — GABAPENTIN 600 MILLIGRAM(S): 400 CAPSULE ORAL at 21:15

## 2021-07-13 RX ADMIN — INSULIN GLARGINE 8 UNIT(S): 100 INJECTION, SOLUTION SUBCUTANEOUS at 21:15

## 2021-07-13 RX ADMIN — Medication 75 MILLIGRAM(S): at 15:11

## 2021-07-13 RX ADMIN — Medication 100 MILLIGRAM(S): at 05:36

## 2021-07-13 RX ADMIN — Medication 100 MILLIGRAM(S): at 21:15

## 2021-07-13 RX ADMIN — Medication 1: at 12:20

## 2021-07-13 RX ADMIN — APIXABAN 2.5 MILLIGRAM(S): 2.5 TABLET, FILM COATED ORAL at 05:36

## 2021-07-13 RX ADMIN — AMLODIPINE BESYLATE 10 MILLIGRAM(S): 2.5 TABLET ORAL at 18:04

## 2021-07-13 RX ADMIN — SENNA PLUS 2 TABLET(S): 8.6 TABLET ORAL at 21:15

## 2021-07-13 RX ADMIN — Medication 100 MILLIGRAM(S): at 18:04

## 2021-07-13 RX ADMIN — ATORVASTATIN CALCIUM 80 MILLIGRAM(S): 80 TABLET, FILM COATED ORAL at 21:16

## 2021-07-13 RX ADMIN — Medication 75 MILLIGRAM(S): at 21:16

## 2021-07-13 RX ADMIN — Medication 75 MILLIGRAM(S): at 05:36

## 2021-07-13 NOTE — PROGRESS NOTE ADULT - SUBJECTIVE AND OBJECTIVE BOX
Pt was seen for 25 min for supportive tx. Pt had no complaints. Pt reported doing well since last seen. He denied pain, feelings of anxiety or sadness or any pressing concerns. He has been fully participating in tx, and reports improvement in ambulation initially with a walker and now with a straight cane, some ADLs like dressing, toileting and showering, and more fluent speech. Pt reported feeling happy because of his improvement and that he might be close to d/c home. Pt denied having relatives close to his home, and is aware that will have to rely on the availability of HHA services, and possibly also assistance for meals. Discussed the need to work on preventing another CVA by controlling risk factors such as smoking, as he has been a smoker most of his adult life, and drug use. Pt denied cravings and reported not liking the nicotine patch, and denied use of other substances. However, some education about the deleterious effects of substances, especially stimulants, on CNS and cardiovascular functioning was provided to Pt. Pt appeared attentive, but appeared somewhat dismissive at the same time. Pt reported improved sleep, but appetite remains low mostly due to not having the items he usually favors - discussed also in passing the importance of adequate nutrition in the prevention of CVAs. He reported fair to good energy levels. Support and encouragement were provided.

## 2021-07-13 NOTE — PROGRESS NOTE ADULT - SUBJECTIVE AND OBJECTIVE BOX
HPI:  MARK ANTHONY QUINTANA is a 55M with PMHx of HTN, DM, HLD, ,depression, and prior CVA with no residual deficits, who presented to [facility] on 06/11/2021 with several days of dysphagia and difficulty ambulating. Patient had been experiencing symptoms since 6/8 after coming home from a dental appointment. He was brought to the hospital after a wellness check from his insurance company. In the ED, his Utox tested positive for cocaine, though patient denied use, and CT head scan showed findings c/w an acute right corona radiata infarct, along with multiple b/l subacute infarcts. Patient was managed medically, as he was not a candidate for either tPA or thrombectomy.    Patient was evaluated by PM&R and therapy for functional deficits and gait/ ADL impairments and recommended acute rehabilitation. Patient was medically optimized for discharge to [facility] on 06/21/2021. Patient was seen and examined at the bedside upon arrival.  (21 Jun 2021 15:04)      PAST MEDICAL & SURGICAL HISTORY:  Cerebrovascular accident (CVA)    Hypertension    Hyperlipidemia    Diabetes mellitus    Depression    Subjective:    Patient seen and examined.  Pt had a slight cough overnight. This morning the cough has improved. States he had some phlegm associated with it.   Pt slept well overnight as per nursing note.   Denies any pain at this time.   Denies any-other complaints at this time.       ICU Vital Signs Last 24 Hrs  T(C): 36.7 (13 Jul 2021 07:39), Max: 36.7 (13 Jul 2021 07:39)  T(F): 98.1 (13 Jul 2021 07:39), Max: 98.1 (13 Jul 2021 07:39)  HR: 75 (13 Jul 2021 07:39) (63 - 75)  BP: 130/80 (13 Jul 2021 07:39) (130/80 - 154/81)  BP(mean): --  ABP: --  ABP(mean): --  RR: 16 (13 Jul 2021 07:39) (16 - 17)  SpO2: 95% (13 Jul 2021 07:39) (95% - 97%)      REVIEW OF SYMPTOMS  [Positive:  Neurological --cognitive deficits,   Fatigue      ----------------------------------------------------------------------  PHYSICAL EXAM:      Constitutional - NAD, Comfortable  HEENT - NCAT  Chest - CTA  Cardio - RRR  Abdomen -  Soft, NTND,   Extremities - No peripheral edema, No calf tenderness   Neurologic Exam:                    Cognitive -             Orientation: more alert & awake today.      Speech - (+) mild dysarthria,  No aphasia     Cranial Nerves - Right pupil sluggish, left pupil reactive, Visual fields slightly impaired on right, flattening of right NLF, EOMI, Shoulder shrug intact, +dysarthria,      Motor -                      LEFT    UE - ShAB 5/5, EF 5/5, EE 5/5, WE 5/5,  WNL                    RIGHT UE - ShAB 5/5, EF 5/5, EE 4/5, WE 5/5,  WNL                    LEFT    LE - HF 5/5, KE 5/5, DF 5/5, PF 5/5                    RIGHT LE - 5/5        Sensory - diminished sensation in dorsum and plantar aspect of right foot     Coordination - FTN intact & HTS intact  Psychiatric - mood stable, appropriate    RECENT LABS                                   10.6   5.00  )-----------( 259      ( 12 Jul 2021 06:15 )             32.4     07-12    139  |  108  |  44<H>  ----------------------------<  104<H>  4.6   |  26  |  2.71<H>    Ca    8.8      12 Jul 2021 06:15    TPro  6.4  /  Alb  2.6<L>  /  TBili  0.5  /  DBili  0.1  /  AST  43<H>  /  ALT  46<H>  /  AlkPhos  152<H>  07-12      RADIOLOGY/OTHER RESULTS      MEDICATIONS  (STANDING):  amLODIPine   Tablet 10 milliGRAM(s) Oral <User Schedule>  apixaban 2.5 milliGRAM(s) Oral every 12 hours  atorvastatin 80 milliGRAM(s) Oral at bedtime  citalopram 20 milliGRAM(s) Oral daily  dextrose 40% Gel 15 Gram(s) Oral once  dextrose 5%. 1000 milliLiter(s) (50 mL/Hr) IV Continuous <Continuous>  dextrose 5%. 1000 milliLiter(s) (100 mL/Hr) IV Continuous <Continuous>  dextrose 50% Injectable 25 Gram(s) IV Push once  dextrose 50% Injectable 12.5 Gram(s) IV Push once  dextrose 50% Injectable 25 Gram(s) IV Push once  gabapentin 600 milliGRAM(s) Oral at bedtime  glucagon  Injectable 1 milliGRAM(s) IntraMuscular once  hydrALAZINE 75 milliGRAM(s) Oral every 8 hours  insulin glargine Injectable (LANTUS) 8 Unit(s) SubCutaneous at bedtime  insulin lispro (ADMELOG) corrective regimen sliding scale   SubCutaneous three times a day before meals  insulin lispro (ADMELOG) corrective regimen sliding scale   SubCutaneous at bedtime  labetalol 100 milliGRAM(s) Oral two times a day  polyethylene glycol 3350 17 Gram(s) Oral daily  senna 2 Tablet(s) Oral at bedtime    MEDICATIONS  (PRN):  acetaminophen    Suspension .. 650 milliGRAM(s) Oral every 6 hours PRN Temp greater or equal to 38C (100.4F), Mild Pain (1 - 3)  bisacodyl 5 milliGRAM(s) Oral every 12 hours PRN Constipation  guaiFENesin Oral Liquid (Sugar-Free) 100 milliGRAM(s) Oral every 6 hours PRN Cough

## 2021-07-13 NOTE — CHART NOTE - NSCHARTNOTEFT_GEN_A_CORE
Nutrition Follow Up Note  Hospital Course (Per Electronic Medical Record): 55M with PMHx of HTN, DM, HLD, ,depression, and prior CVA with no residual deifcits, who presented to [facility] on 06/11/2021 with dysphagia and difficulty ambulating, & dysarthria found to have an acute right corona radiata infarct. Admitted for multidisciplinary rehab program.    Source: Medical Record [X] Patient [X] Family [ ]         Diet: regular, consistent carbohydrate (evening snack), DASH/TLC, no concentrated potassium   Pt tolerating diet with good PO intake, typically consuming % of meals; however pt reports he did not like breakfast this AM. Pt provided food preferences. Reviewed current diet/heart healthy, consistent carbohydrate diet recommendations. Pt requires review of nutrition education materials.     Enteral/Parenteral Nutrition: N/A    Current Weight: 225.7 lbs (7/13) weight fluctuations noted, no edema appreciated.  217.3 lbs (7/10)  223.5 lbs (7/8)  218.6 lbs (7/5)  217.1 lbs (7/4)  216.2 lbs (6/30)  217.1 lbs (6/29)  218.2 lbs (6/28)  217.5 lbs (6/27)  214.5 lbs (6/26)  215.3 lbs (6/25)    Pertinent Medications: MEDICATIONS  (STANDING):  amLODIPine   Tablet 10 milliGRAM(s) Oral <User Schedule>  apixaban 2.5 milliGRAM(s) Oral every 12 hours  atorvastatin 80 milliGRAM(s) Oral at bedtime  citalopram 20 milliGRAM(s) Oral daily  dextrose 40% Gel 15 Gram(s) Oral once  dextrose 5%. 1000 milliLiter(s) (50 mL/Hr) IV Continuous <Continuous>  dextrose 5%. 1000 milliLiter(s) (100 mL/Hr) IV Continuous <Continuous>  dextrose 50% Injectable 25 Gram(s) IV Push once  dextrose 50% Injectable 12.5 Gram(s) IV Push once  dextrose 50% Injectable 25 Gram(s) IV Push once  gabapentin 600 milliGRAM(s) Oral at bedtime  glucagon  Injectable 1 milliGRAM(s) IntraMuscular once  hydrALAZINE 75 milliGRAM(s) Oral every 8 hours  insulin glargine Injectable (LANTUS) 8 Unit(s) SubCutaneous at bedtime  insulin lispro (ADMELOG) corrective regimen sliding scale   SubCutaneous three times a day before meals  insulin lispro (ADMELOG) corrective regimen sliding scale   SubCutaneous at bedtime  labetalol 100 milliGRAM(s) Oral two times a day  polyethylene glycol 3350 17 Gram(s) Oral daily  senna 2 Tablet(s) Oral at bedtime  traZODone 50 milliGRAM(s) Oral at bedtime    MEDICATIONS  (PRN):  acetaminophen    Suspension .. 650 milliGRAM(s) Oral every 6 hours PRN Temp greater or equal to 38C (100.4F), Mild Pain (1 - 3)  bisacodyl 5 milliGRAM(s) Oral every 12 hours PRN Constipation  guaiFENesin Oral Liquid (Sugar-Free) 100 milliGRAM(s) Oral every 6 hours PRN Cough      Pertinent Labs:  07-13 Na136 mmol/L Glu 106 mg/dL<H> K+ 4.3 mmol/L Cr  2.71 mg/dL<H> BUN 44 mg/dL<H> 07-07 Phos 4.3 mg/dL 07-12 Alb 2.6 g/dL<L>  POCT glucose x 4: 125 (H), 156 (H), 166 (H), 220 (H)      Skin: Skin intact per nursing flow sheets     Edema: No edema per nursing flow sheets     Last BM: on 7/10 Per nursing flowsheets     Estimated Needs:   [X] No Change since Previous Assessment  [ ] Recalculated:     Previous Nutrition Diagnosis:   Moderate malnutrition     Nutrition Diagnosis is [X] Ongoing    [ ] Resolved   [ ] Not Applicable      New Nutrition Diagnosis: [X] Not Applicable  [ ] Inadequate Protein Energy Intake   [ ] Inadequate Oral Intake   [ ] Excessive Energy Intake   [ ] Increased Nutrient Needs   [ ] Obesity   [ ] Altered GI Function   [ ] Unintended Weight Loss   [ ] Food & Nutrition Related Knowledge Deficit  [ ] Limited Adherence to nutrition related recommendations   [ ] Malnutrition      Interventions:   1. Recommend continue with current diet  2. Reviewed nutrition education on heart healthy, consistent carb diet  3. Consider d/c potassium restriction; pt's potassium wnl     Monitoring & Evaluation:   [X] Weights   [X] PO Intake   [X] Follow Up (Per Protocol)  [X] Tolerance to Diet Prescription   [X] Other: Labs & POCT glucose     RD Remains Available.  Aleyda Lora RD

## 2021-07-13 NOTE — PROGRESS NOTE ADULT - ASSESSMENT
· Assessment	  MARK ANTHONY QUINTANA is a 55M with PMHx of HTN, DM, HLD, ,depression, and prior CVA with no residual deifcits, who presented to [facility] on 06/11/2021 with dysphagia and difficulty ambulating, & dysarthria found to have an acute right corona radiata infarct. Admitted for multidisciplinary rehab program.      #Comprehensive Multidisciplinary Rehab Program:  - Gait, ADL, Functional impairments  - PT/OT/ SLP 3 hours a day 5 days a week  - recreation therapy   - Pt is stable & awaiting discharge .    #Acute corona radiata infarct and multiple b/l subacute infarcts  - distribution of b/l subacute infarcts suggestive of cardioembolic origin  - not a candidate for tPA or thrombectomy  - c/w Eliquis 2.5mg q12h and atorvastatin 80mg qhs    #cough   - guaiFENesin prn for the cough.   - improving.   - VSS.     #HLD  - c/w atorvastatin    #HTN  -- BP controlled -  target <140/90  --cont. Labetalol 100mg q12h on 7/5  - cont. amlodipine 10mg in evening   - Hydralazine increased to 75 mg TID by nephrolology on 7/7  --hospitalist following    #Acute on chronic CKD  -  Cr stable - - probably CKD3  --nephrology following-- note reviewed-- Avoid nephrotoxins  SONO w/o hydro  Renal diet  -- continue to encourage PO hydration    #DM  - FS and ISS  -Lantus 8 units  -- Adjustment as per hospitalist  - DC home on Sitagliptin 25mg daily (not metformin due to JOSE).      #Neuropathy  - c/w gabapentin 600mg qhs    #Tobacco use  - Nicotine 21mg/24hr    #Mood/Depression  - c/w Citalopram 20mg daily    #Sleep:  - Maintain quiet hours and low stim environment;  --nursing order to turn off TV at 9PM to encourage proper sleep hygiene  - cont.  trazodone    #Pain:  - Tylenol PRN  - avoid sedating meds that may affect cognitive recovery    #GI/Bowel:  - Senna 2 tabs qhs and Miralax PRN  - PO Dulcolax PRN    #/Bladder:  - continent      #Skin/ Pressure Injury Prevention:  - assessment on admission   - Turn Q2hrs in bed while awake, OOB to Chair, PT/OT/SLP     #DVT prophylaxis:  - Eliquis  - SCDs  - neg for b/l LE DVTs at [facility]    #Precautions/ Restrictions  - Falls  - Lungs: Aspiration,   - COVID PCR: neg 6/11    #Dispo: IDR 07/08/2021  - OT: supervision eating/grooming/UBD; CG toilet transfer/shower transfer; CG bathing; min A LBD  - PT: CG transfer, ambulation 120ft with RW--Min A with cane; CG 12 steps with 2 hand rails  - SLP: mod-severe cog deficits--impaired PS, Poor safety and insight, impaired comprehension, Impulsive; needs assist with money management and meds, impaired memory, visual deficits; (+) dysarthria; reg/thins  .  SW contacted pt's [facility] program --they are processing request to extend HHA hours. family cannot provide supervision   - TDD: DC  home once HHA hours approved    --------------------------------------------  Outpatient Follow up:  Neurology - Dr. Nena Dumont on 07/22/2021 @ 11AM via telehealth  PCP  ----------------------------------------         · Assessment	  MARK ANTHONY QUINTANA is a 55M with PMHx of HTN, DM, HLD, ,depression, and prior CVA with no residual deifcits, who presented to [facility] on 06/11/2021 with dysphagia and difficulty ambulating, & dysarthria found to have an acute right corona radiata infarct. Admitted for multidisciplinary rehab program.      #Comprehensive Multidisciplinary Rehab Program:  - Gait, ADL, Functional impairments  - PT/OT/ SLP 3 hours a day 5 days a week  - recreation therapy   - Pt is stable & awaiting discharge .    #Acute corona radiata infarct and multiple b/l subacute infarcts  - distribution of b/l subacute infarcts suggestive of cardioembolic origin  - not a candidate for tPA or thrombectomy  - c/w Eliquis 2.5mg q12h and atorvastatin 80mg qhs    #cough   - guaiFENesin prn for the cough.   - improving.   - VSS.     #HLD  - c/w atorvastatin    #HTN  -- BP controlled -  target <140/90  --cont. Labetalol 100mg q12h on 7/5  - cont. amlodipine 10mg in evening   - Hydralazine increased to 75 mg TID by nephrolology on 7/7  --hospitalist following    #Acute on chronic CKD  -  Cr stable - - probably CKD3  --nephrology following-- note reviewed-- Avoid nephrotoxins  SONO w/o hydro  Renal diet  -- continue to encourage PO hydration    #DM  - FS and ISS  -Lantus 8 units  -- Adjustment as per hospitalist  - DC home on Sitagliptin 25mg daily (not metformin due to JOSE).      #Neuropathy  - c/w gabapentin 600mg qhs    #Tobacco use  - Nicotine 21mg/24hr    #Mood/Depression  - c/w Citalopram 20mg daily    #Sleep:  - Maintain quiet hours and low stim environment;  --nursing order to turn off TV at 9PM to encourage proper sleep hygiene  - cont.  trazodone    #Pain:  - Tylenol PRN  - avoid sedating meds that may affect cognitive recovery    #GI/Bowel:  - Senna 2 tabs qhs and Miralax PRN  - PO Dulcolax PRN    #/Bladder:  - continent      #Skin/ Pressure Injury Prevention:  - assessment on admission   - Turn Q2hrs in bed while awake, OOB to Chair, PT/OT/SLP     #DVT prophylaxis:  - Eliquis  - SCDs  - neg for b/l LE DVTs at [facility]    #Precautions/ Restrictions  - Falls  - Lungs: Aspiration,   - COVID PCR: neg 6/11    #Dispo: IDR 07/08/2021  - OT: supervision eating/grooming/UBD; CG toilet transfer/shower transfer; CG bathing; min A LBD  - PT: CG transfer, ambulation 120ft with RW--Min A with cane; CG 12 steps with 2 hand rails  - SLP: mod-severe cog deficits--impaired PS, Poor safety and insight, impaired comprehension, Impulsive; needs assist with money management and meds, impaired memory, visual deficits; (+) dysarthria; reg/thins  .  SW contacted pt's [facility] program --they are processing request to extend HHA hours. family cannot provide supervision   - TDD: DC  home once HHA hours approved    --------------------------------------------  Outpatient Follow up:  Neurology - Dr. Nena Dumont on 07/22/2021 @ 11AM via telehealth  PCP  ----------------------------------------

## 2021-07-13 NOTE — PROGRESS NOTE ADULT - ASSESSMENT
Pt alert, fair attention, slurred speech but intact receptive expressive/receptive language, constricted affect, euthymic mood, denied AH/VH, denied SI/HI/I/P, thought processes goal-directed, no abnormal thought contents noted, calm behavior, limited insight into implications of substance use, improved physical functioning and expressive skills. Plan: Continue tx.

## 2021-07-13 NOTE — PROGRESS NOTE ADULT - SUBJECTIVE AND OBJECTIVE BOX
No distress    Vital Signs Last 24 Hrs  T(C): 36.7 (07-13-21 @ 07:39), Max: 36.7 (07-13-21 @ 07:39)  T(F): 98.1 (07-13-21 @ 07:39), Max: 98.1 (07-13-21 @ 07:39)  HR: 73 (07-13-21 @ 18:05) (68 - 75)  BP: 154/82 (07-13-21 @ 18:05) (130/80 - 156/83)  RR: 16 (07-13-21 @ 18:05) (16 - 16)  SpO2: 97% (07-13-21 @ 18:05) (95% - 98%)    I&O's Detail    12 Jul 2021 07:01  -  13 Jul 2021 07:00  --------------------------------------------------------  IN:    Oral Fluid: 620 mL  Total IN: 620 mL    OUT:    Voided (mL): 360 mL  Total OUT: 360 mL    Total NET: 260 mL    13 Jul 2021 07:01  -  13 Jul 2021 20:14  --------------------------------------------------------  IN:    Oral Fluid: 600 mL  Total IN: 600 mL    OUT:    Voided (mL): 400 mL  Total OUT: 400 mL    Total NET: 200 mL    s1s2  b/l air entry  soft, ND  no edema                                               10.6   5.00  )-----------( 259      ( 12 Jul 2021 06:15 )             32.4     13 Jul 2021 05:30    136    |  105    |  44     ----------------------------<  106    4.3     |  24     |  2.71     Ca    8.2        13 Jul 2021 05:30    TPro  6.4    /  Alb  2.6    /  TBili  0.5    /  DBili  0.1    /  AST  43     /  ALT  46     /  AlkPhos  152    12 Jul 2021 06:15    LIVER FUNCTIONS - ( 12 Jul 2021 06:15 )  Alb: 2.6 g/dL / Pro: 6.4 g/dL / ALK PHOS: 152 U/L / ALT: 46 U/L / AST: 43 U/L / GGT: x           acetaminophen    Suspension .. 650 milliGRAM(s) Oral every 6 hours PRN  amLODIPine   Tablet 10 milliGRAM(s) Oral <User Schedule>  apixaban 2.5 milliGRAM(s) Oral every 12 hours  atorvastatin 80 milliGRAM(s) Oral at bedtime  bisacodyl 5 milliGRAM(s) Oral every 12 hours PRN  citalopram 20 milliGRAM(s) Oral daily  dextrose 40% Gel 15 Gram(s) Oral once  dextrose 5%. 1000 milliLiter(s) IV Continuous <Continuous>  dextrose 5%. 1000 milliLiter(s) IV Continuous <Continuous>  dextrose 50% Injectable 25 Gram(s) IV Push once  dextrose 50% Injectable 12.5 Gram(s) IV Push once  dextrose 50% Injectable 25 Gram(s) IV Push once  gabapentin 600 milliGRAM(s) Oral at bedtime  glucagon  Injectable 1 milliGRAM(s) IntraMuscular once  guaiFENesin Oral Liquid (Sugar-Free) 100 milliGRAM(s) Oral every 6 hours PRN  hydrALAZINE 75 milliGRAM(s) Oral every 8 hours  insulin glargine Injectable (LANTUS) 8 Unit(s) SubCutaneous at bedtime  insulin lispro (ADMELOG) corrective regimen sliding scale   SubCutaneous three times a day before meals  insulin lispro (ADMELOG) corrective regimen sliding scale   SubCutaneous at bedtime  labetalol 100 milliGRAM(s) Oral two times a day  polyethylene glycol 3350 17 Gram(s) Oral daily  senna 2 Tablet(s) Oral at bedtime    A/P:    DM/ischemic proteinuric CKD 3 (Cr 2.3 - 6/28/21)  Cr is fluctuating but overall stable  Avoid nephrotoxins  SONO w/o hydro  F/u BMP  No NSIAD's  Renal f/u as op    [phone]

## 2021-07-14 LAB
B PERT DNA SPEC QL NAA+PROBE: SIGNIFICANT CHANGE UP
C PNEUM DNA SPEC QL NAA+PROBE: SIGNIFICANT CHANGE UP
FLUAV H1 2009 PAND RNA SPEC QL NAA+PROBE: SIGNIFICANT CHANGE UP
FLUAV H1 RNA SPEC QL NAA+PROBE: SIGNIFICANT CHANGE UP
FLUAV H3 RNA SPEC QL NAA+PROBE: SIGNIFICANT CHANGE UP
FLUAV SUBTYP SPEC NAA+PROBE: SIGNIFICANT CHANGE UP
FLUBV RNA SPEC QL NAA+PROBE: SIGNIFICANT CHANGE UP
GLUCOSE BLDC GLUCOMTR-MCNC: 121 MG/DL — HIGH (ref 70–99)
GLUCOSE BLDC GLUCOMTR-MCNC: 169 MG/DL — HIGH (ref 70–99)
GLUCOSE BLDC GLUCOMTR-MCNC: 173 MG/DL — HIGH (ref 70–99)
GLUCOSE BLDC GLUCOMTR-MCNC: 182 MG/DL — HIGH (ref 70–99)
HADV DNA SPEC QL NAA+PROBE: SIGNIFICANT CHANGE UP
HCOV PNL SPEC NAA+PROBE: SIGNIFICANT CHANGE UP
HMPV RNA SPEC QL NAA+PROBE: SIGNIFICANT CHANGE UP
HPIV1 RNA SPEC QL NAA+PROBE: SIGNIFICANT CHANGE UP
HPIV2 RNA SPEC QL NAA+PROBE: SIGNIFICANT CHANGE UP
HPIV3 RNA SPEC QL NAA+PROBE: DETECTED — SIGNIFICANT CHANGE UP
HPIV4 RNA SPEC QL NAA+PROBE: SIGNIFICANT CHANGE UP
RAPID RVP RESULT: DETECTED
RV+EV RNA SPEC QL NAA+PROBE: SIGNIFICANT CHANGE UP
SARS-COV-2 RNA SPEC QL NAA+PROBE: SIGNIFICANT CHANGE UP

## 2021-07-14 PROCEDURE — 99233 SBSQ HOSP IP/OBS HIGH 50: CPT

## 2021-07-14 PROCEDURE — 71045 X-RAY EXAM CHEST 1 VIEW: CPT | Mod: 26

## 2021-07-14 PROCEDURE — 99232 SBSQ HOSP IP/OBS MODERATE 35: CPT

## 2021-07-14 PROCEDURE — 93971 EXTREMITY STUDY: CPT | Mod: 26,RT

## 2021-07-14 RX ADMIN — Medication 1: at 12:17

## 2021-07-14 RX ADMIN — Medication 1: at 17:16

## 2021-07-14 RX ADMIN — APIXABAN 2.5 MILLIGRAM(S): 2.5 TABLET, FILM COATED ORAL at 17:23

## 2021-07-14 RX ADMIN — INSULIN GLARGINE 8 UNIT(S): 100 INJECTION, SOLUTION SUBCUTANEOUS at 22:00

## 2021-07-14 RX ADMIN — SENNA PLUS 2 TABLET(S): 8.6 TABLET ORAL at 22:00

## 2021-07-14 RX ADMIN — ATORVASTATIN CALCIUM 80 MILLIGRAM(S): 80 TABLET, FILM COATED ORAL at 21:59

## 2021-07-14 RX ADMIN — CITALOPRAM 20 MILLIGRAM(S): 10 TABLET, FILM COATED ORAL at 12:16

## 2021-07-14 RX ADMIN — Medication 100 MILLIGRAM(S): at 17:23

## 2021-07-14 RX ADMIN — Medication 75 MILLIGRAM(S): at 22:00

## 2021-07-14 RX ADMIN — Medication 100 MILLIGRAM(S): at 05:38

## 2021-07-14 RX ADMIN — GABAPENTIN 600 MILLIGRAM(S): 400 CAPSULE ORAL at 21:59

## 2021-07-14 RX ADMIN — Medication 75 MILLIGRAM(S): at 05:38

## 2021-07-14 RX ADMIN — Medication 75 MILLIGRAM(S): at 14:53

## 2021-07-14 RX ADMIN — AMLODIPINE BESYLATE 10 MILLIGRAM(S): 2.5 TABLET ORAL at 17:23

## 2021-07-14 RX ADMIN — Medication 100 MILLIGRAM(S): at 22:00

## 2021-07-14 RX ADMIN — APIXABAN 2.5 MILLIGRAM(S): 2.5 TABLET, FILM COATED ORAL at 05:38

## 2021-07-14 NOTE — PROGRESS NOTE ADULT - ASSESSMENT
56 y/o M with PMHx of HTN, T2DM, HLD, depression, and prior CVA with no residual deficits, who presented to [facility] on 06/11/2021 with dysphagia and difficulty ambulating; found to have an acute right corona radiata infarct. Admitted for multidisciplinary rehab program- pt.ot. dvt ppx    #cough- cxr and RVP stat    #LE edema- will get doppler r/o dvt, leg elevation, teds, will follow    #Acute corona radiata infarct and multiple b/l subacute infarcts  -Eliquis 2.5mg q12h and atorvastatin     #JOSE, baseline Cr unknown - probably CKD3: creatinine improving  #Hyperkalemia: improved  - Encourage PO hydration  - Avoid nephrotoxins, monitor bmp   -Renal sono reviewed - no b/l hydro, renal mass, or calculi visualized   - Monitor K levels  - encouraged PO hydration  - Urine Osm, Urine Sodium,  - UA: no infection or blood. proteinuria +  - Renal cx noted    #HTN  - target <140/90  -Continue Hydralazine , Labetalol, amlodipine    # HLD- Lipitor     #T2DM, A1C 5.9 - controlled  -FS and ISS  -  home med: Metformin 500mg BID, Glipizide 5mg daily, and Sitagliptin 50mg daily  - Lantus 8un qhs while in rehab  - DC home with Sitagliptin 25 qd,   -  metformin dced due to JOSE/CKD with close PCP follow up and consistent carb diet.  - likely d/c home    #Neuropathy  -Gabapentin    #Chronic Tobacco use  -Nicotine 21mg/24hr  -Smoking cessation counseling provided    #Mood/Depression  -Citalopram       #DVT ppx - Eliquis  -neg for b/l LE DVTs at [facility]    d/w Dr. Shane  will follow

## 2021-07-14 NOTE — PROGRESS NOTE ADULT - SUBJECTIVE AND OBJECTIVE BOX
Patient is a 55y old  Male who presents with a chief complaint of Acute right corona radiata CVA (13 Jul 2021 20:13)    HPI:  MARK ANTHONY QUINTANA is a 55M with PMHx of HTN, DM, HLD, ,depression, and prior CVA with no residual deficits, who presented to [facility] on 06/11/2021 with several days of dysphagia and difficulty ambulating. Patient had been experiencing symptoms since 6/8 after coming home from a dental appointment. He was brought to the hospital after a wellness check from his insurance company. In the ED, his Utox tested positive for cocaine, though patient denied use, and CT head scan showed findings c/w an acute right corona radiata infarct, along with multiple b/l subacute infarcts. Patient was managed medically, as he was not a candidate for either tPA or thrombectomy.    Patient was evaluated by PM&R and therapy for functional deficits and gait/ ADL impairments and recommended acute rehabilitation. Patient was medically optimized for discharge to [facility] on 06/21/2021. Patient was seen and examined at the bedside upon arrival.  (21 Jun 2021 15:04)    PAST MEDICAL & SURGICAL HISTORY:  Cerebrovascular accident (CVA)    Hypertension    Hyperlipidemia    Diabetes mellitus    Depression      Allergies    No Known Allergies    Intolerances      ----------------------------------------------------------------------    SUBJECTIVE: Patient seen this morning. Complaining of cough. No acute events overnight.      ROS:  Positive: cough  Denies: headache, lightheadedness, CP, SOB, abdominal pain, dysuria, nausea, constipation      ----------------------------------------------------------------------  PHYSICAL EXAM:    Vital Signs Last 24 Hrs  T(C): 37.1 (14 Jul 2021 08:25), Max: 37.1 (14 Jul 2021 08:25)  T(F): 98.8 (14 Jul 2021 08:25), Max: 98.8 (14 Jul 2021 08:25)  HR: 72 (14 Jul 2021 08:25) (68 - 73)  BP: 141/81 (14 Jul 2021 08:25) (141/81 - 162/89)  BP(mean): --  RR: 16 (14 Jul 2021 08:25) (16 - 16)  SpO2: 96% (14 Jul 2021 08:25) (96% - 98%)  Daily     Daily       Constitutional - NAD, Comfortable  HEENT - NCAT  Chest - CTA  Cardio - RRR  Abdomen -  Soft, NTND,   Extremities - No peripheral edema, No calf tenderness   Neurologic Exam:                    Cognitive -             Orientation: more alert & awake today.      Speech - (+) mild dysarthria,  No aphasia     Cranial Nerves - Right pupil sluggish, left pupil reactive, Visual fields slightly impaired on right, flattening of right NLF, EOMI, Shoulder shrug intact, +dysarthria,      Motor -                      LEFT    UE - ShAB 5/5, EF 5/5, EE 5/5, WE 5/5,  WNL                    RIGHT UE - ShAB 5/5, EF 5/5, EE 4/5, WE 5/5,  WNL                    LEFT    LE - HF 5/5, KE 5/5, DF 5/5, PF 5/5                    RIGHT LE - 5/5        Sensory - diminished sensation in dorsum and plantar aspect of right foot     Coordination - FTN intact & HTS intact  Psychiatric - mood stable, appropriate      ----------------------------------------------------------------------  RECENT LABS:      07-13    136  |  105  |  44<H>  ----------------------------<  106<H>  4.3   |  24  |  2.71<H>    Ca    8.2<L>      13 Jul 2021 05:30            CAPILLARY BLOOD GLUCOSE      POCT Blood Glucose.: 121 mg/dL (14 Jul 2021 08:24)  POCT Blood Glucose.: 155 mg/dL (13 Jul 2021 21:14)  POCT Blood Glucose.: 170 mg/dL (13 Jul 2021 16:58)  POCT Blood Glucose.: 165 mg/dL (13 Jul 2021 12:15)    ----------------------------------------------------------------------  RECENT IMAGING:    US Renal (06.25.21 @ 15:30)  IMPRESSION: No bilateral hydronephrosis. The bladder is not well distended, limiting assessment. Bladder volume at the time of examination approximates 64 cc. Bladder wall thickening is suggested although this appearance may be related to nondistended state. Clinical correlation is suggested.  ----------------------------------------------------------------------  MEDICATIONS:  MEDICATIONS  (STANDING):  amLODIPine   Tablet 10 milliGRAM(s) Oral <User Schedule>  apixaban 2.5 milliGRAM(s) Oral every 12 hours  atorvastatin 80 milliGRAM(s) Oral at bedtime  citalopram 20 milliGRAM(s) Oral daily  dextrose 40% Gel 15 Gram(s) Oral once  dextrose 5%. 1000 milliLiter(s) (50 mL/Hr) IV Continuous <Continuous>  dextrose 5%. 1000 milliLiter(s) (100 mL/Hr) IV Continuous <Continuous>  dextrose 50% Injectable 25 Gram(s) IV Push once  dextrose 50% Injectable 12.5 Gram(s) IV Push once  dextrose 50% Injectable 25 Gram(s) IV Push once  gabapentin 600 milliGRAM(s) Oral at bedtime  glucagon  Injectable 1 milliGRAM(s) IntraMuscular once  hydrALAZINE 75 milliGRAM(s) Oral every 8 hours  insulin glargine Injectable (LANTUS) 8 Unit(s) SubCutaneous at bedtime  insulin lispro (ADMELOG) corrective regimen sliding scale   SubCutaneous three times a day before meals  insulin lispro (ADMELOG) corrective regimen sliding scale   SubCutaneous at bedtime  labetalol 100 milliGRAM(s) Oral two times a day  polyethylene glycol 3350 17 Gram(s) Oral daily  senna 2 Tablet(s) Oral at bedtime    MEDICATIONS  (PRN):  acetaminophen    Suspension .. 650 milliGRAM(s) Oral every 6 hours PRN Temp greater or equal to 38C (100.4F), Mild Pain (1 - 3)  bisacodyl 5 milliGRAM(s) Oral every 12 hours PRN Constipation  guaiFENesin Oral Liquid (Sugar-Free) 100 milliGRAM(s) Oral every 6 hours PRN Cough    ----------------------------------------------------------------------    Assessment and Plan:   · Assessment	  · Assessment	  MARK ANTHONY QUINTANA is a 55M with PMHx of HTN, DM, HLD, ,depression, and prior CVA with no residual deifcits, who presented to [facility] on 06/11/2021 with dysphagia and difficulty ambulating, & dysarthria found to have an acute right corona radiata infarct. Admitted for multidisciplinary rehab program.      #Comprehensive Multidisciplinary Rehab Program:  - Gait, ADL, Functional impairments  - PT/OT/ SLP 3 hours a day 5 days a week  - recreation therapy   - Pt is stable & awaiting discharge .    #Acute corona radiata infarct and multiple b/l subacute infarcts  - distribution of b/l subacute infarcts suggestive of cardioembolic origin  - not a candidate for tPA or thrombectomy  - c/w Eliquis 2.5mg q12h and atorvastatin 80mg qhs    #cough   - Discussed with hospitalist. obtaining CXR and will send for respiratory panel  - guaiFENesin prn for the cough.    - VSS.     #HLD  - c/w atorvastatin    #HTN  -- BP controlled -  target <140/90  --cont. Labetalol 100mg q12h on 7/5  - cont. amlodipine 10mg in evening   - Hydralazine increased to 75 mg TID by nephrolology on 7/7  --hospitalist following    #Acute on chronic CKD  -  Cr stable - - probably CKD3  --nephrology following-- note reviewed-- Avoid nephrotoxins  SONO w/o hydro  Renal diet  -- continue to encourage PO hydration    #DM  - FS and ISS  -Lantus 8 units  -- Adjustment as per hospitalist  - DC home on Sitagliptin 25mg daily (not metformin due to JOSE).      #Neuropathy  - c/w gabapentin 600mg qhs    #Tobacco use  - Nicotine 21mg/24hr    #Mood/Depression  - c/w Citalopram 20mg daily    #Sleep:  - Maintain quiet hours and low stim environment;  --nursing order to turn off TV at 9PM to encourage proper sleep hygiene  - cont.  trazodone    #Pain:  - Tylenol PRN  - avoid sedating meds that may affect cognitive recovery    #GI/Bowel:  - Senna 2 tabs qhs and Miralax PRN  - PO Dulcolax PRN    #/Bladder:  - continent      #Skin/ Pressure Injury Prevention:  - assessment on admission   - Turn Q2hrs in bed while awake, OOB to Chair, PT/OT/SLP     #DVT prophylaxis:  - Eliquis  - SCDs  - neg for b/l LE DVTs at [facility]    #Precautions/ Restrictions  - Falls  - Lungs: Aspiration,   - COVID PCR: neg 6/11    #Dispo: IDR 07/08/2021  - OT: supervision eating/grooming/UBD; CG toilet transfer/shower transfer; CG bathing; min A LBD  - PT: CG transfer, ambulation 120ft with RW--Min A with cane; CG 12 steps with 2 hand rails  - SLP: mod-severe cog deficits--impaired PS, Poor safety and insight, impaired comprehension, Impulsive; needs assist with money management and meds, impaired memory, visual deficits; (+) dysarthria; reg/thins  .  SW contacted pt's [facility] program --they are processing request to extend HHA hours. family cannot provide supervision   - TDD: DC  home once HHA hours approved    --------------------------------------------  Outpatient Follow up:  Neurology - Dr. Nena Dumont on 07/22/2021 @ 11AM via telehealth  PCP  ----------------------------------------           Patient is a 55y old  Male who presents with a chief complaint of Acute right corona radiata CVA (13 Jul 2021 20:13)    HPI:  MARK ANTHONY QUINTANA is a 55M with PMHx of HTN, DM, HLD, ,depression, and prior CVA with no residual deficits, who presented to [facility] on 06/11/2021 with several days of dysphagia and difficulty ambulating. Patient had been experiencing symptoms since 6/8 after coming home from a dental appointment. He was brought to the hospital after a wellness check from his insurance company. In the ED, his Utox tested positive for cocaine, though patient denied use, and CT head scan showed findings c/w an acute right corona radiata infarct, along with multiple b/l subacute infarcts. Patient was managed medically, as he was not a candidate for either tPA or thrombectomy.    Patient was evaluated by PM&R and therapy for functional deficits and gait/ ADL impairments and recommended acute rehabilitation. Patient was medically optimized for discharge to [facility] on 06/21/2021. Patient was seen and examined at the bedside upon arrival.  (21 Jun 2021 15:04)    PAST MEDICAL & SURGICAL HISTORY:  Cerebrovascular accident (CVA)    Hypertension    Hyperlipidemia    Diabetes mellitus    Depression      Allergies    No Known Allergies    Intolerances      ----------------------------------------------------------------------    SUBJECTIVE: Patient seen this morning. Complaining of cough. No acute events overnight.       ROS:  Positive: cough  Denies: headache, lightheadedness, CP, SOB, abdominal pain, dysuria, nausea, constipation      ----------------------------------------------------------------------  PHYSICAL EXAM:    Vital Signs Last 24 Hrs  T(C): 37.1 (14 Jul 2021 08:25), Max: 37.1 (14 Jul 2021 08:25)  T(F): 98.8 (14 Jul 2021 08:25), Max: 98.8 (14 Jul 2021 08:25)  HR: 72 (14 Jul 2021 08:25) (68 - 73)  BP: 141/81 (14 Jul 2021 08:25) (141/81 - 162/89)  BP(mean): --  RR: 16 (14 Jul 2021 08:25) (16 - 16)  SpO2: 96% (14 Jul 2021 08:25) (96% - 98%)  Daily     Daily       Constitutional - NAD, Comfortable  HEENT - NCAT  Chest - CTA  Cardio - RRR  Abdomen -  Soft, NTND,   Extremities - right leg edema, No calf tenderness   Neurologic Exam:                    Cognitive -             Orientation: more alert & awake today.      Speech - (+) mild dysarthria,  No aphasia     Cranial Nerves - Right pupil sluggish, left pupil reactive, Visual fields slightly impaired on right, flattening of right NLF, EOMI, Shoulder shrug intact, +dysarthria,      Motor -                      LEFT    UE - ShAB 5/5, EF 5/5, EE 5/5, WE 5/5,  WNL                    RIGHT UE - ShAB 5/5, EF 5/5, EE 4/5, WE 5/5,  WNL                    LEFT    LE - HF 5/5, KE 5/5, DF 5/5, PF 5/5                    RIGHT LE - 5/5        Sensory - diminished sensation in dorsum and plantar aspect of right foot     Coordination - FTN intact & HTS intact  Psychiatric - mood stable, appropriate      ----------------------------------------------------------------------  RECENT LABS:      07-13    136  |  105  |  44<H>  ----------------------------<  106<H>  4.3   |  24  |  2.71<H>    Ca    8.2<L>      13 Jul 2021 05:30            CAPILLARY BLOOD GLUCOSE      POCT Blood Glucose.: 121 mg/dL (14 Jul 2021 08:24)  POCT Blood Glucose.: 155 mg/dL (13 Jul 2021 21:14)  POCT Blood Glucose.: 170 mg/dL (13 Jul 2021 16:58)  POCT Blood Glucose.: 165 mg/dL (13 Jul 2021 12:15)    ----------------------------------------------------------------------  RECENT IMAGING:    US Renal (06.25.21 @ 15:30)  IMPRESSION: No bilateral hydronephrosis. The bladder is not well distended, limiting assessment. Bladder volume at the time of examination approximates 64 cc. Bladder wall thickening is suggested although this appearance may be related to nondistended state. Clinical correlation is suggested.  ----------------------------------------------------------------------  MEDICATIONS:  MEDICATIONS  (STANDING):  amLODIPine   Tablet 10 milliGRAM(s) Oral <User Schedule>  apixaban 2.5 milliGRAM(s) Oral every 12 hours  atorvastatin 80 milliGRAM(s) Oral at bedtime  citalopram 20 milliGRAM(s) Oral daily  dextrose 40% Gel 15 Gram(s) Oral once  dextrose 5%. 1000 milliLiter(s) (50 mL/Hr) IV Continuous <Continuous>  dextrose 5%. 1000 milliLiter(s) (100 mL/Hr) IV Continuous <Continuous>  dextrose 50% Injectable 25 Gram(s) IV Push once  dextrose 50% Injectable 12.5 Gram(s) IV Push once  dextrose 50% Injectable 25 Gram(s) IV Push once  gabapentin 600 milliGRAM(s) Oral at bedtime  glucagon  Injectable 1 milliGRAM(s) IntraMuscular once  hydrALAZINE 75 milliGRAM(s) Oral every 8 hours  insulin glargine Injectable (LANTUS) 8 Unit(s) SubCutaneous at bedtime  insulin lispro (ADMELOG) corrective regimen sliding scale   SubCutaneous three times a day before meals  insulin lispro (ADMELOG) corrective regimen sliding scale   SubCutaneous at bedtime  labetalol 100 milliGRAM(s) Oral two times a day  polyethylene glycol 3350 17 Gram(s) Oral daily  senna 2 Tablet(s) Oral at bedtime    MEDICATIONS  (PRN):  acetaminophen    Suspension .. 650 milliGRAM(s) Oral every 6 hours PRN Temp greater or equal to 38C (100.4F), Mild Pain (1 - 3)  bisacodyl 5 milliGRAM(s) Oral every 12 hours PRN Constipation  guaiFENesin Oral Liquid (Sugar-Free) 100 milliGRAM(s) Oral every 6 hours PRN Cough    ----------------------------------------------------------------------    Assessment and Plan:   · Assessment	  · Assessment	  MARK ANTHONY QUINTANA is a 55M with PMHx of HTN, DM, HLD, ,depression, and prior CVA with no residual deifcits, who presented to [facility] on 06/11/2021 with dysphagia and difficulty ambulating, & dysarthria found to have an acute right corona radiata infarct. Admitted for multidisciplinary rehab program.      #Comprehensive Multidisciplinary Rehab Program:  - Gait, ADL, Functional impairments  - PT/OT/ SLP 3 hours a day 5 days a week  - recreation therapy     #Acute corona radiata infarct and multiple b/l subacute infarcts  - distribution of b/l subacute infarcts suggestive of cardioembolic origin  - not a candidate for tPA or thrombectomy  - c/w Eliquis 2.5mg q12h and atorvastatin 80mg qhs    #cough   - Discussed with hospitalist. obtaining CXR and will send for respiratory panel  - guaiFENesin prn for the cough.    - VSS.     #RLE Edema  -pending venous doppler    #HLD  - c/w atorvastatin    #HTN  -- BP controlled -  target <140/90  --cont. Labetalol 100mg q12h on 7/5  - cont. amlodipine 10mg in evening   - Hydralazine increased to 75 mg TID by nephrolology on 7/7  --hospitalist following    #Acute on chronic CKD  -  Cr stable - - probably CKD3  --nephrology following-- note reviewed-- Avoid nephrotoxins  SONO w/o hydro  Renal diet  -- continue to encourage PO hydration    #DM  - FS and ISS  -Lantus 8 units  -- Adjustment as per hospitalist  - DC home on Sitagliptin 25mg daily (not metformin due to JOSE).      #Neuropathy  - c/w gabapentin 600mg qhs    #Tobacco use  - Nicotine 21mg/24hr    #Mood/Depression  - c/w Citalopram 20mg daily    #Sleep:  - Maintain quiet hours and low stim environment;  --nursing order to turn off TV at 9PM to encourage proper sleep hygiene  - cont.  trazodone    #Pain:  - Tylenol PRN  - avoid sedating meds that may affect cognitive recovery    #GI/Bowel:  - Senna 2 tabs qhs and Miralax PRN  - PO Dulcolax PRN    #/Bladder:  - continent    #Skin/ Pressure Injury Prevention:  - assessment on admission   - Turn Q2hrs in bed while awake, OOB to Chair, PT/OT/SLP     #DVT prophylaxis:  - Eliquis  - SCDs  - neg for b/l LE DVTs at [facility]    #Precautions/ Restrictions  - Falls  - Lungs: Aspiration,   - COVID PCR: neg 6/11    #Dispo: IDR 07/08/2021  - OT: supervision eating/grooming/UBD; CG toilet transfer/shower transfer; CG bathing; min A LBD  - PT: CG transfer, ambulation 120ft with RW--Min A with cane; CG 12 steps with 2 hand rails  - SLP: mod-severe cog deficits--impaired PS, Poor safety and insight, impaired comprehension, Impulsive; needs assist with money management and meds, impaired memory, visual deficits; (+) dysarthria; reg/thins  .  SW contacted pt's [facility] program --they are processing request to extend HHA hours. family cannot provide supervision   - TDD: DC  home once HHA hours approved    --------------------------------------------  Outpatient Follow up:  Neurology - Dr. Nena Dumont on 07/22/2021 @ 11AM via telehealth  PCP  ----------------------------------------

## 2021-07-14 NOTE — PROGRESS NOTE ADULT - SUBJECTIVE AND OBJECTIVE BOX
55y old  Male who presents with a chief complaint of Acute right corona radiata CVA     seen at the bedside, noted to have a productive cough per staff, no fever, no new complaints, no n/v, no sob.      Vital Signs Last 24 Hrs  T(C): 37.1 (14 Jul 2021 08:25), Max: 37.1 (14 Jul 2021 08:25)  T(F): 98.8 (14 Jul 2021 08:25), Max: 98.8 (14 Jul 2021 08:25)  HR: 72 (14 Jul 2021 08:25) (68 - 73)  BP: 141/81 (14 Jul 2021 08:25) (141/81 - 162/89)  BP(mean): --  RR: 16 (14 Jul 2021 08:25) (16 - 16)  SpO2: 96% (14 Jul 2021 08:25) (96% - 98%)    GENERAL- NAD  EAR/NOSE/MOUTH/THROAT - no pharyngeal exudates, no oral lesions  MMM  EYES- MAT, conjunctiva and Sclera clear  NECK- supple  RESPIRATORY-  clear to auscultation bilaterally, non laboured breathing  CARDIOVASCULAR - SIS2, RRR  GI - soft NT BS present  EXTREMITIES- right leg edema+  NEUROLOGY- RLE weakness, dysarthria  SKIN- no rashes, warm to touch  PSYCHIATRY- AAO X 3                  x                    136  | 24   | 44           x     >-----------< x       ------------------------< 106                   x                    4.3  | 105  | 2.71                                         Ca 8.2   Mg x     Ph x          CAPILLARY BLOOD GLUCOSE      POCT Blood Glucose.: 121 mg/dL (14 Jul 2021 08:24)  POCT Blood Glucose.: 155 mg/dL (13 Jul 2021 21:14)  POCT Blood Glucose.: 170 mg/dL (13 Jul 2021 16:58)  POCT Blood Glucose.: 165 mg/dL (13 Jul 2021 12:15)      MEDICATIONS  (STANDING):  amLODIPine   Tablet 10 milliGRAM(s) Oral <User Schedule>  apixaban 2.5 milliGRAM(s) Oral every 12 hours  atorvastatin 80 milliGRAM(s) Oral at bedtime  citalopram 20 milliGRAM(s) Oral daily  gabapentin 600 milliGRAM(s) Oral at bedtime  glucagon  Injectable 1 milliGRAM(s) IntraMuscular once  hydrALAZINE 75 milliGRAM(s) Oral every 8 hours  insulin glargine Injectable (LANTUS) 8 Unit(s) SubCutaneous at bedtime  insulin lispro (ADMELOG) corrective regimen sliding scale   SubCutaneous three times a day before meals  insulin lispro (ADMELOG) corrective regimen sliding scale   SubCutaneous at bedtime  labetalol 100 milliGRAM(s) Oral two times a day  polyethylene glycol 3350 17 Gram(s) Oral daily  senna 2 Tablet(s) Oral at bedtime

## 2021-07-15 LAB
ALBUMIN SERPL ELPH-MCNC: 2.7 G/DL — LOW (ref 3.3–5)
ALP SERPL-CCNC: 167 U/L — HIGH (ref 40–120)
ALT FLD-CCNC: 53 U/L — HIGH (ref 10–45)
ANION GAP SERPL CALC-SCNC: 10 MMOL/L — SIGNIFICANT CHANGE UP (ref 5–17)
AST SERPL-CCNC: 52 U/L — HIGH (ref 10–40)
BILIRUB DIRECT SERPL-MCNC: 0.1 MG/DL — SIGNIFICANT CHANGE UP (ref 0–0.2)
BILIRUB INDIRECT FLD-MCNC: 0.5 MG/DL — SIGNIFICANT CHANGE UP (ref 0.2–1)
BILIRUB SERPL-MCNC: 0.6 MG/DL — SIGNIFICANT CHANGE UP (ref 0.2–1.2)
BUN SERPL-MCNC: 40 MG/DL — HIGH (ref 7–23)
CALCIUM SERPL-MCNC: 8.7 MG/DL — SIGNIFICANT CHANGE UP (ref 8.4–10.5)
CHLORIDE SERPL-SCNC: 104 MMOL/L — SIGNIFICANT CHANGE UP (ref 96–108)
CO2 SERPL-SCNC: 24 MMOL/L — SIGNIFICANT CHANGE UP (ref 22–31)
CREAT SERPL-MCNC: 2.76 MG/DL — HIGH (ref 0.5–1.3)
GLUCOSE BLDC GLUCOMTR-MCNC: 104 MG/DL — HIGH (ref 70–99)
GLUCOSE BLDC GLUCOMTR-MCNC: 122 MG/DL — HIGH (ref 70–99)
GLUCOSE BLDC GLUCOMTR-MCNC: 134 MG/DL — HIGH (ref 70–99)
GLUCOSE BLDC GLUCOMTR-MCNC: 151 MG/DL — HIGH (ref 70–99)
GLUCOSE SERPL-MCNC: 105 MG/DL — HIGH (ref 70–99)
HCT VFR BLD CALC: 32.1 % — LOW (ref 39–50)
HGB BLD-MCNC: 10.5 G/DL — LOW (ref 13–17)
MCHC RBC-ENTMCNC: 29.8 PG — SIGNIFICANT CHANGE UP (ref 27–34)
MCHC RBC-ENTMCNC: 32.7 GM/DL — SIGNIFICANT CHANGE UP (ref 32–36)
MCV RBC AUTO: 91.2 FL — SIGNIFICANT CHANGE UP (ref 80–100)
NRBC # BLD: 0 /100 WBCS — SIGNIFICANT CHANGE UP (ref 0–0)
PLATELET # BLD AUTO: 212 K/UL — SIGNIFICANT CHANGE UP (ref 150–400)
POTASSIUM SERPL-MCNC: 4.2 MMOL/L — SIGNIFICANT CHANGE UP (ref 3.5–5.3)
POTASSIUM SERPL-SCNC: 4.2 MMOL/L — SIGNIFICANT CHANGE UP (ref 3.5–5.3)
PROT SERPL-MCNC: 6.8 G/DL — SIGNIFICANT CHANGE UP (ref 6–8.3)
RBC # BLD: 3.52 M/UL — LOW (ref 4.2–5.8)
RBC # FLD: 11.4 % — SIGNIFICANT CHANGE UP (ref 10.3–14.5)
SODIUM SERPL-SCNC: 138 MMOL/L — SIGNIFICANT CHANGE UP (ref 135–145)
WBC # BLD: 4.38 K/UL — SIGNIFICANT CHANGE UP (ref 3.8–10.5)
WBC # FLD AUTO: 4.38 K/UL — SIGNIFICANT CHANGE UP (ref 3.8–10.5)

## 2021-07-15 PROCEDURE — 99232 SBSQ HOSP IP/OBS MODERATE 35: CPT

## 2021-07-15 RX ORDER — IPRATROPIUM/ALBUTEROL SULFATE 18-103MCG
3 AEROSOL WITH ADAPTER (GRAM) INHALATION ONCE
Refills: 0 | Status: COMPLETED | OUTPATIENT
Start: 2021-07-15 | End: 2021-07-15

## 2021-07-15 RX ORDER — BUDESONIDE, MICRONIZED 100 %
0.5 POWDER (GRAM) MISCELLANEOUS EVERY 12 HOURS
Refills: 0 | Status: COMPLETED | OUTPATIENT
Start: 2021-07-15 | End: 2021-07-22

## 2021-07-15 RX ADMIN — CITALOPRAM 20 MILLIGRAM(S): 10 TABLET, FILM COATED ORAL at 11:17

## 2021-07-15 RX ADMIN — GABAPENTIN 600 MILLIGRAM(S): 400 CAPSULE ORAL at 21:28

## 2021-07-15 RX ADMIN — INSULIN GLARGINE 8 UNIT(S): 100 INJECTION, SOLUTION SUBCUTANEOUS at 21:28

## 2021-07-15 RX ADMIN — Medication 100 MILLIGRAM(S): at 17:50

## 2021-07-15 RX ADMIN — Medication 100 MILLIGRAM(S): at 06:34

## 2021-07-15 RX ADMIN — Medication 1: at 16:46

## 2021-07-15 RX ADMIN — APIXABAN 2.5 MILLIGRAM(S): 2.5 TABLET, FILM COATED ORAL at 17:50

## 2021-07-15 RX ADMIN — Medication 75 MILLIGRAM(S): at 21:28

## 2021-07-15 RX ADMIN — Medication 75 MILLIGRAM(S): at 13:27

## 2021-07-15 RX ADMIN — Medication 0.5 MILLIGRAM(S): at 21:37

## 2021-07-15 RX ADMIN — Medication 100 MILLIGRAM(S): at 06:35

## 2021-07-15 RX ADMIN — ATORVASTATIN CALCIUM 80 MILLIGRAM(S): 80 TABLET, FILM COATED ORAL at 21:28

## 2021-07-15 RX ADMIN — AMLODIPINE BESYLATE 10 MILLIGRAM(S): 2.5 TABLET ORAL at 17:50

## 2021-07-15 RX ADMIN — Medication 75 MILLIGRAM(S): at 06:34

## 2021-07-15 RX ADMIN — Medication 3 MILLILITER(S): at 12:15

## 2021-07-15 RX ADMIN — APIXABAN 2.5 MILLIGRAM(S): 2.5 TABLET, FILM COATED ORAL at 06:34

## 2021-07-15 NOTE — PROGRESS NOTE ADULT - SUBJECTIVE AND OBJECTIVE BOX
No distress    Vital Signs Last 24 Hrs  T(C): 36.6 (07-15-21 @ 07:40), Max: 36.7 (07-14-21 @ 21:55)  T(F): 97.8 (07-15-21 @ 07:40), Max: 98.1 (07-14-21 @ 21:55)  HR: 63 (07-15-21 @ 07:40) (61 - 70)  BP: 130/83 (07-15-21 @ 07:40) (130/83 - 146/88)  RR: 15 (07-15-21 @ 07:40) (15 - 15)  SpO2: 97% (07-15-21 @ 07:40) (95% - 97%)    s1s2  b/l air entry  soft, ND  no edema                                                       10.5   4.38  )-----------( 212      ( 15 Jul 2021 05:00 )             32.1     15 Jul 2021 05:00    138    |  104    |  40     ----------------------------<  105    4.2     |  24     |  2.76     Ca    8.7        15 Jul 2021 05:00    TPro  6.8    /  Alb  2.7    /  TBili  0.6    /  DBili  0.1    /  AST  52     /  ALT  53     /  AlkPhos  167    15 Jul 2021 05:00    LIVER FUNCTIONS - ( 15 Jul 2021 05:00 )  Alb: 2.7 g/dL / Pro: 6.8 g/dL / ALK PHOS: 167 U/L / ALT: 53 U/L / AST: 52 U/L / GGT: x           acetaminophen    Suspension .. 650 milliGRAM(s) Oral every 6 hours PRN  amLODIPine   Tablet 10 milliGRAM(s) Oral <User Schedule>  apixaban 2.5 milliGRAM(s) Oral every 12 hours  atorvastatin 80 milliGRAM(s) Oral at bedtime  bisacodyl 5 milliGRAM(s) Oral every 12 hours PRN  buDESOnide    Inhalation Suspension 0.5 milliGRAM(s) Inhalation every 12 hours  citalopram 20 milliGRAM(s) Oral daily  dextrose 40% Gel 15 Gram(s) Oral once  dextrose 5%. 1000 milliLiter(s) IV Continuous <Continuous>  dextrose 5%. 1000 milliLiter(s) IV Continuous <Continuous>  dextrose 50% Injectable 25 Gram(s) IV Push once  dextrose 50% Injectable 12.5 Gram(s) IV Push once  dextrose 50% Injectable 25 Gram(s) IV Push once  gabapentin 600 milliGRAM(s) Oral at bedtime  glucagon  Injectable 1 milliGRAM(s) IntraMuscular once  guaiFENesin Oral Liquid (Sugar-Free) 100 milliGRAM(s) Oral every 6 hours PRN  hydrALAZINE 75 milliGRAM(s) Oral every 8 hours  insulin glargine Injectable (LANTUS) 8 Unit(s) SubCutaneous at bedtime  insulin lispro (ADMELOG) corrective regimen sliding scale   SubCutaneous three times a day before meals  insulin lispro (ADMELOG) corrective regimen sliding scale   SubCutaneous at bedtime  labetalol 100 milliGRAM(s) Oral two times a day  polyethylene glycol 3350 17 Gram(s) Oral daily  senna 2 Tablet(s) Oral at bedtime    A/P:    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

## 2021-07-15 NOTE — PROGRESS NOTE ADULT - SUBJECTIVE AND OBJECTIVE BOX
HPI:  MARK ANTHONY QUINTANA is a 55M with PMHx of HTN, DM, HLD, ,depression, and prior CVA with no residual deficits, who presented to [facility] on 06/11/2021 with several days of dysphagia and difficulty ambulating. Patient had been experiencing symptoms since 6/8 after coming home from a dental appointment. He was brought to the hospital after a wellness check from his insurance company. In the ED, his Utox tested positive for cocaine, though patient denied use, and CT head scan showed findings c/w an acute right corona radiata infarct, along with multiple b/l subacute infarcts. Patient was managed medically, as he was not a candidate for either tPA or thrombectomy.    Patient was evaluated by PM&R and therapy for functional deficits and gait/ ADL impairments and recommended acute rehabilitation. Patient was medically optimized for discharge to [facility] on 06/21/2021. Patient was seen and examined at the bedside upon arrival.  (21 Jun 2021 15:04)      PAST MEDICAL & SURGICAL HISTORY:  Cerebrovascular accident (CVA)    Hypertension    Hyperlipidemia    Diabetes mellitus    Depression        Subjective:  c/o phelm in chest,  states robitussin not helping much.  Frustrated he got URI in hospital.  Wants to go home.        VITALS  Vital Signs Last 24 Hrs  T(C): 36.6 (15 Jul 2021 07:40), Max: 36.7 (14 Jul 2021 21:55)  T(F): 97.8 (15 Jul 2021 07:40), Max: 98.1 (14 Jul 2021 21:55)  HR: 63 (15 Jul 2021 07:40) (61 - 76)  BP: 130/83 (15 Jul 2021 07:40) (130/83 - 156/78)  BP(mean): --  RR: 15 (15 Jul 2021 07:40) (15 - 15)  SpO2: 97% (15 Jul 2021 07:40) (95% - 97%)    REVIEW OF SYMPTOMS  Positive: cough  Denies: headache, lightheadedness, CP, SOB, abdominal pain, dysuria, nausea, constipation      ----------------------------------------------------------------------  PHYSICAL EXAM:    Constitutional - NAD, Comfortable  HEENT - NCAT  Chest - +diffuse wheezing  Cardio - RRR  Abdomen -  Soft, NTND,   Extremities - right leg edema, No calf tenderness   Neurologic Exam:                    Cognitive -             Orientation: alert & awake, Ox 3     Speech - (+) mild dysarthria,  No aphasia     Cranial Nerves - Right pupil sluggish, left pupil reactive, Visual fields slightly impaired on right, flattening of right NLF, EOMI, Shoulder shrug intact, +dysarthria,      Motor -                      LEFT    UE - ShAB 5/5, EF 5/5, EE 5/5, WE 5/5,  WNL                    RIGHT UE - ShAB 5/5, EF 5/5, EE 4/5, WE 5/5,  WNL                    LEFT    LE - HF 5/5, KE 5/5, DF 5/5, PF 5/5                    RIGHT LE - 5/5        Sensory - diminished sensation in dorsum and plantar aspect of right foot     Coordination - FTN intact & HTS intact  Psychiatric - mood stable, appropriate      RECENT LABS                        10.5   4.38  )-----------( 212      ( 15 Jul 2021 05:00 )             32.1     07-15    138  |  104  |  40<H>  ----------------------------<  105<H>  4.2   |  24  |  2.76<H>    Ca    8.7      15 Jul 2021 05:00    TPro  6.8  /  Alb  2.7<L>  /  TBili  0.6  /  DBili  0.1  /  AST  52<H>  /  ALT  53<H>  /  AlkPhos  167<H>  07-15            RADIOLOGY/OTHER RESULTS      MEDICATIONS  (STANDING):  albuterol/ipratropium for Nebulization. 3 milliLiter(s) Nebulizer once  amLODIPine   Tablet 10 milliGRAM(s) Oral <User Schedule>  apixaban 2.5 milliGRAM(s) Oral every 12 hours  atorvastatin 80 milliGRAM(s) Oral at bedtime  citalopram 20 milliGRAM(s) Oral daily  dextrose 40% Gel 15 Gram(s) Oral once  dextrose 5%. 1000 milliLiter(s) (50 mL/Hr) IV Continuous <Continuous>  dextrose 5%. 1000 milliLiter(s) (100 mL/Hr) IV Continuous <Continuous>  dextrose 50% Injectable 25 Gram(s) IV Push once  dextrose 50% Injectable 12.5 Gram(s) IV Push once  dextrose 50% Injectable 25 Gram(s) IV Push once  gabapentin 600 milliGRAM(s) Oral at bedtime  glucagon  Injectable 1 milliGRAM(s) IntraMuscular once  hydrALAZINE 75 milliGRAM(s) Oral every 8 hours  insulin glargine Injectable (LANTUS) 8 Unit(s) SubCutaneous at bedtime  insulin lispro (ADMELOG) corrective regimen sliding scale   SubCutaneous three times a day before meals  insulin lispro (ADMELOG) corrective regimen sliding scale   SubCutaneous at bedtime  labetalol 100 milliGRAM(s) Oral two times a day  polyethylene glycol 3350 17 Gram(s) Oral daily  senna 2 Tablet(s) Oral at bedtime    MEDICATIONS  (PRN):  acetaminophen    Suspension .. 650 milliGRAM(s) Oral every 6 hours PRN Temp greater or equal to 38C (100.4F), Mild Pain (1 - 3)  bisacodyl 5 milliGRAM(s) Oral every 12 hours PRN Constipation  guaiFENesin Oral Liquid (Sugar-Free) 100 milliGRAM(s) Oral every 6 hours PRN Cough         HPI:  MARK ANTHONY QUINTANA is a 55M with PMHx of HTN, DM, HLD, ,depression, and prior CVA with no residual deficits, who presented to [facility] on 06/11/2021 with several days of dysphagia and difficulty ambulating. Patient had been experiencing symptoms since 6/8 after coming home from a dental appointment. He was brought to the hospital after a wellness check from his insurance company. In the ED, his Utox tested positive for cocaine, though patient denied use, and CT head scan showed findings c/w an acute right corona radiata infarct, along with multiple b/l subacute infarcts. Patient was managed medically, as he was not a candidate for either tPA or thrombectomy.    Patient was evaluated by PM&R and therapy for functional deficits and gait/ ADL impairments and recommended acute rehabilitation. Patient was medically optimized for discharge to [facility] on 06/21/2021. Patient was seen and examined at the bedside upon arrival.  (21 Jun 2021 15:04)      PAST MEDICAL & SURGICAL HISTORY:  Cerebrovascular accident (CVA)    Hypertension    Hyperlipidemia    Diabetes mellitus    Depression        Subjective:  c/o phelm in chest,  states robitussin not helping much.  Frustrated he got URI in hospital.  Wants to go home.        VITALS  Vital Signs Last 24 Hrs  T(C): 36.6 (15 Jul 2021 07:40), Max: 36.7 (14 Jul 2021 21:55)  T(F): 97.8 (15 Jul 2021 07:40), Max: 98.1 (14 Jul 2021 21:55)  HR: 63 (15 Jul 2021 07:40) (61 - 76)  BP: 130/83 (15 Jul 2021 07:40) (130/83 - 156/78)  BP(mean): --  RR: 15 (15 Jul 2021 07:40) (15 - 15)  SpO2: 97% (15 Jul 2021 07:40) (95% - 97%)    REVIEW OF SYMPTOMS  Positive: cough  Denies: headache, lightheadedness, CP, SOB, abdominal pain, dysuria, nausea, constipation      ----------------------------------------------------------------------  PHYSICAL EXAM:    Constitutional - NAD, Comfortable  HEENT - NCAT  Chest - +diffuse wheezing  Cardio - RRR  Abdomen -  Soft, NTND,   Extremities - right leg edema, No calf tenderness   Neurologic Exam:                    Cognitive -             Orientation: alert & awake, Ox 3     Speech - (+) mild dysarthria,  No aphasia     Cranial Nerves - Right pupil sluggish, left pupil reactive, Visual fields slightly impaired on right, flattening of right NLF, EOMI, Shoulder shrug intact, +dysarthria,      Motor -                      LEFT    UE - ShAB 5/5, EF 5/5, EE 5/5, WE 5/5,  WNL                    RIGHT UE - ShAB 5/5, EF 5/5, EE 4/5, WE 5/5,  WNL                    LEFT    LE - HF 5/5, KE 5/5, DF 5/5, PF 5/5                    RIGHT LE - HF 4/5, KF 3/5, KE 5/5, DF 2/5, PF 3/5      Sensory - diminished sensation in dorsum and plantar aspect of right foot     Coordination - FTN intact & HTS intact  Psychiatric - mood stable, appropriate      RECENT LABS                        10.5   4.38  )-----------( 212      ( 15 Jul 2021 05:00 )             32.1     07-15    138  |  104  |  40<H>  ----------------------------<  105<H>  4.2   |  24  |  2.76<H>    Ca    8.7      15 Jul 2021 05:00    TPro  6.8  /  Alb  2.7<L>  /  TBili  0.6  /  DBili  0.1  /  AST  52<H>  /  ALT  53<H>  /  AlkPhos  167<H>  07-15            RADIOLOGY/OTHER RESULTS      MEDICATIONS  (STANDING):  albuterol/ipratropium for Nebulization. 3 milliLiter(s) Nebulizer once  amLODIPine   Tablet 10 milliGRAM(s) Oral <User Schedule>  apixaban 2.5 milliGRAM(s) Oral every 12 hours  atorvastatin 80 milliGRAM(s) Oral at bedtime  citalopram 20 milliGRAM(s) Oral daily  dextrose 40% Gel 15 Gram(s) Oral once  dextrose 5%. 1000 milliLiter(s) (50 mL/Hr) IV Continuous <Continuous>  dextrose 5%. 1000 milliLiter(s) (100 mL/Hr) IV Continuous <Continuous>  dextrose 50% Injectable 25 Gram(s) IV Push once  dextrose 50% Injectable 12.5 Gram(s) IV Push once  dextrose 50% Injectable 25 Gram(s) IV Push once  gabapentin 600 milliGRAM(s) Oral at bedtime  glucagon  Injectable 1 milliGRAM(s) IntraMuscular once  hydrALAZINE 75 milliGRAM(s) Oral every 8 hours  insulin glargine Injectable (LANTUS) 8 Unit(s) SubCutaneous at bedtime  insulin lispro (ADMELOG) corrective regimen sliding scale   SubCutaneous three times a day before meals  insulin lispro (ADMELOG) corrective regimen sliding scale   SubCutaneous at bedtime  labetalol 100 milliGRAM(s) Oral two times a day  polyethylene glycol 3350 17 Gram(s) Oral daily  senna 2 Tablet(s) Oral at bedtime    MEDICATIONS  (PRN):  acetaminophen    Suspension .. 650 milliGRAM(s) Oral every 6 hours PRN Temp greater or equal to 38C (100.4F), Mild Pain (1 - 3)  bisacodyl 5 milliGRAM(s) Oral every 12 hours PRN Constipation  guaiFENesin Oral Liquid (Sugar-Free) 100 milliGRAM(s) Oral every 6 hours PRN Cough

## 2021-07-15 NOTE — PROGRESS NOTE ADULT - ASSESSMENT
· Assessment	  MARK ANTHONY QUINTANA is a 55M with PMHx of HTN, DM, HLD, ,depression, and prior CVA with no residual deifcits, who presented to [facility] on 06/11/2021 with dysphagia and difficulty ambulating, & dysarthria found to have an acute right corona radiata infarct. Admitted for multidisciplinary rehab program.      #Comprehensive Multidisciplinary Rehab Program:  - Gait, ADL, Functional impairments  - PT/OT/ SLP 3 hours a day 5 days a week  - recreation therapy     #Acute corona radiata infarct and multiple b/l subacute infarcts  - distribution of b/l subacute infarcts suggestive of cardioembolic origin  - not a candidate for tPA or thrombectomy  - c/w Eliquis 2.5mg q12h and atorvastatin 80mg qhs    #cough --Viral URI  - RVP + for parainfluenza 3  --duoneb ordered  --d/w hospitalist to f/u regarding treatment recommendations.   - guaiFENesin prn for the cough.    - VSS.     #RLE Edema  -venous doppler Neg    #HLD  - c/w atorvastatin    #HTN  -- BP controlled -  target <140/90  --cont. Labetalol 100mg q12h on 7/5  - cont. amlodipine 10mg in evening   - Hydralazine increased to 75 mg TID by nephrolology on 7/7  --hospitalist following    #Acute on chronic CKD  -  Cr stable - - probably CKD3  --nephrology following-- note reviewed-- Avoid nephrotoxins  SONO w/o hydro  Renal diet  -- continue to encourage PO hydration    #DM  - FS and ISS  -Lantus 8 units  -- Adjustment as per hospitalist  - DC home on Sitagliptin 25mg daily (not metformin due to JOSE).      #Neuropathy  - c/w gabapentin 600mg qhs    #Tobacco use  - Nicotine 21mg/24hr    #Mood/Depression  - c/w Citalopram 20mg daily    #Sleep:  - Maintain quiet hours and low stim environment;  --nursing order to turn off TV at 9PM to encourage proper sleep hygiene  - cont.  trazodone    #Pain:  - Tylenol PRN  - avoid sedating meds that may affect cognitive recovery    #GI/Bowel:  - Senna 2 tabs qhs and Miralax PRN  - PO Dulcolax PRN    #/Bladder:  - continent    #Skin/ Pressure Injury Prevention:  - assessment on admission   - Turn Q2hrs in bed while awake, OOB to Chair, PT/OT/SLP     #DVT prophylaxis:  - Eliquis  - SCDs  - neg for b/l LE DVTs at [facility]    #Precautions/ Restrictions  - Falls  - Lungs: Aspiration,   - COVID PCR: neg 6/11    #Dispo: IDR 07/15/2021  - OT: supervision eating/grooming/UBD; CG toilet transfer/shower transfer; CG bathing; min A LBD, Mod A toileting  - PT: CS transfer, CG ambulation 130ft with RW--decreased right foot clearance--was fitted for AFO; Min A 12 steps with 2 hand rails  - SLP: mod-severe  PS, Poor safety and insight, Moderate Memory deficits; needs assist with money management and meds,  visual deficits; (+) dysarthria; reg/thins  .  SW contacted pt's [facility] program --they are processing request to extend HHA hours. family cannot provide supervision   - TDD: DC  home once HHA hours approved    --------------------------------------------  Outpatient Follow up:  Neurology - Dr. Nena Dumont on 07/22/2021 @ 11AM via telehealth  PCP  ----------------------------------------   · Assessment	  MARK ANTHONY QUINTANA is a 55M with PMHx of HTN, DM, HLD, ,depression, and prior CVA with no residual deifcits, who presented to [facility] on 06/11/2021 with dysphagia and difficulty ambulating, & dysarthria found to have an acute right corona radiata infarct. Admitted for multidisciplinary rehab program.      #Comprehensive Multidisciplinary Rehab Program:  - Gait, ADL, Functional impairments  - PT/OT/ SLP 3 hours a day 5 days a week  - recreation therapy     #Acute corona radiata infarct and multiple b/l subacute infarcts  - distribution of b/l subacute infarcts suggestive of cardioembolic origin  - not a candidate for tPA or thrombectomy  - c/w Eliquis 2.5mg q12h and atorvastatin 80mg qhs    #cough --Viral URI  - RVP + for parainfluenza 3  --duoneb ordered  --d/w hospitalist to f/u regarding treatment recommendations.   - guaiFENesin prn for the cough.    - VSS.     #RLE Edema  -venous doppler Neg    #HLD  - c/w atorvastatin    #HTN  -- BP controlled -  target <140/90  --cont. Labetalol 100mg q12h on 7/5  - cont. amlodipine 10mg in evening   - Hydralazine increased to 75 mg TID by nephrolology on 7/7  --hospitalist following    #Acute on chronic CKD  -  Cr stable - - probably CKD3  --nephrology following-- note reviewed-- Avoid nephrotoxins  SONO w/o hydro  Renal diet  -- continue to encourage PO hydration    #DM  - FS and ISS  -Lantus 8 units  -- Adjustment as per hospitalist  - DC home on Sitagliptin 25mg daily (not metformin due to JOSE).      #Neuropathy  - c/w gabapentin 600mg qhs    #Tobacco use  - Nicotine 21mg/24hr    #Mood/Depression  - c/w Citalopram 20mg daily    #Sleep:  - Maintain quiet hours and low stim environment;  --nursing order to turn off TV at 9PM to encourage proper sleep hygiene  - cont.  trazodone    #Pain:  - Tylenol PRN  - avoid sedating meds that may affect cognitive recovery    #GI/Bowel:  - Senna 2 tabs qhs and Miralax PRN  - PO Dulcolax PRN    #/Bladder:  - continent    #Skin/ Pressure Injury Prevention:  - assessment on admission   - Turn Q2hrs in bed while awake, OOB to Chair, PT/OT/SLP     #DVT prophylaxis:  - Eliquis  - SCDs  - neg for b/l LE DVTs at [facility]    #Precautions/ Restrictions  - Falls  - Lungs: Aspiration,   - COVID PCR: neg 6/11    #Dispo: IDR 07/15/2021  - OT: supervision eating/grooming/UBD; CG toilet transfer/shower transfer; CG bathing; min A LBD, Mod A toileting  - PT: CS transfer, CG ambulation 130ft with RW--decreased right foot clearance--was fitted for AFO; Min A 12 steps with 2 hand rails  - SLP: mod-severe  PS, Poor safety and insight, Moderate Memory deficits; needs assist with money management and meds,  visual deficits; (+) dysarthria; reg/thins  .  SW contacted pt's [facility] program --they are processing request to extend HHA hours. 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 after a Right Corona Radiata infarct.    He has medical necessity for a right custom molded articulating AFO with a posterior stop at this time.    The custom AFO will reduce the risk of falling by controlling the patient's weakness and foot drop as it will allow the foot to hold in a neutral position during swing phase and permit proper heel contact at the initial phase of stance.    As noted, the brace must include a posterior stop to allow for adjustability.  Pt. requires stabilization of the foot and ankle (because patient's foot drops and scuffs the ground during ambulation) and the patient has the ability to benefit functionally in that he will be able to ambulate with increased safety and decreased risk for falls.    Patient is unable to use a prefabricated brace as because pt. will require use of orthosis permanantly.  Patient remains at risk for falls without the custom AFO.        --------------------------------------------  Outpatient Follow up:  Neurology - Dr. Nena Dumont on 07/22/2021 @ 11AM via telehealth  PCP  ----------------------------------------

## 2021-07-16 LAB
ANION GAP SERPL CALC-SCNC: 9 MMOL/L — SIGNIFICANT CHANGE UP (ref 5–17)
BUN SERPL-MCNC: 40 MG/DL — HIGH (ref 7–23)
CALCIUM SERPL-MCNC: 8.5 MG/DL — SIGNIFICANT CHANGE UP (ref 8.4–10.5)
CHLORIDE SERPL-SCNC: 105 MMOL/L — SIGNIFICANT CHANGE UP (ref 96–108)
CO2 SERPL-SCNC: 24 MMOL/L — SIGNIFICANT CHANGE UP (ref 22–31)
CREAT SERPL-MCNC: 2.56 MG/DL — HIGH (ref 0.5–1.3)
GLUCOSE BLDC GLUCOMTR-MCNC: 142 MG/DL — HIGH (ref 70–99)
GLUCOSE BLDC GLUCOMTR-MCNC: 172 MG/DL — HIGH (ref 70–99)
GLUCOSE BLDC GLUCOMTR-MCNC: 259 MG/DL — HIGH (ref 70–99)
GLUCOSE BLDC GLUCOMTR-MCNC: 92 MG/DL — SIGNIFICANT CHANGE UP (ref 70–99)
GLUCOSE SERPL-MCNC: 87 MG/DL — SIGNIFICANT CHANGE UP (ref 70–99)
POTASSIUM SERPL-MCNC: 4.3 MMOL/L — SIGNIFICANT CHANGE UP (ref 3.5–5.3)
POTASSIUM SERPL-SCNC: 4.3 MMOL/L — SIGNIFICANT CHANGE UP (ref 3.5–5.3)
SODIUM SERPL-SCNC: 138 MMOL/L — SIGNIFICANT CHANGE UP (ref 135–145)

## 2021-07-16 PROCEDURE — 99232 SBSQ HOSP IP/OBS MODERATE 35: CPT

## 2021-07-16 RX ORDER — INSULIN GLARGINE 100 [IU]/ML
5 INJECTION, SOLUTION SUBCUTANEOUS AT BEDTIME
Refills: 0 | Status: DISCONTINUED | OUTPATIENT
Start: 2021-07-16 | End: 2021-07-23

## 2021-07-16 RX ORDER — IPRATROPIUM/ALBUTEROL SULFATE 18-103MCG
3 AEROSOL WITH ADAPTER (GRAM) INHALATION EVERY 8 HOURS
Refills: 0 | Status: DISCONTINUED | OUTPATIENT
Start: 2021-07-16 | End: 2021-07-17

## 2021-07-16 RX ADMIN — GABAPENTIN 600 MILLIGRAM(S): 400 CAPSULE ORAL at 21:03

## 2021-07-16 RX ADMIN — ATORVASTATIN CALCIUM 80 MILLIGRAM(S): 80 TABLET, FILM COATED ORAL at 21:02

## 2021-07-16 RX ADMIN — Medication 3 MILLILITER(S): at 15:51

## 2021-07-16 RX ADMIN — AMLODIPINE BESYLATE 10 MILLIGRAM(S): 2.5 TABLET ORAL at 17:16

## 2021-07-16 RX ADMIN — Medication 0.5 MILLIGRAM(S): at 07:58

## 2021-07-16 RX ADMIN — Medication 75 MILLIGRAM(S): at 06:06

## 2021-07-16 RX ADMIN — Medication 100 MILLIGRAM(S): at 06:07

## 2021-07-16 RX ADMIN — INSULIN GLARGINE 5 UNIT(S): 100 INJECTION, SOLUTION SUBCUTANEOUS at 21:03

## 2021-07-16 RX ADMIN — Medication 3 MILLILITER(S): at 21:49

## 2021-07-16 RX ADMIN — APIXABAN 2.5 MILLIGRAM(S): 2.5 TABLET, FILM COATED ORAL at 17:16

## 2021-07-16 RX ADMIN — Medication 1: at 21:03

## 2021-07-16 RX ADMIN — Medication 75 MILLIGRAM(S): at 14:10

## 2021-07-16 RX ADMIN — APIXABAN 2.5 MILLIGRAM(S): 2.5 TABLET, FILM COATED ORAL at 06:07

## 2021-07-16 RX ADMIN — Medication 100 MILLIGRAM(S): at 17:16

## 2021-07-16 RX ADMIN — CITALOPRAM 20 MILLIGRAM(S): 10 TABLET, FILM COATED ORAL at 12:33

## 2021-07-16 RX ADMIN — Medication 0.5 MILLIGRAM(S): at 21:49

## 2021-07-16 RX ADMIN — Medication 1: at 12:32

## 2021-07-16 RX ADMIN — Medication 75 MILLIGRAM(S): at 22:01

## 2021-07-16 NOTE — PROGRESS NOTE ADULT - SUBJECTIVE AND OBJECTIVE BOX
No distress    Vital Signs Last 24 Hrs  T(C): 36.7 (07-15-21 @ 21:18), Max: 36.7 (07-15-21 @ 21:18)  T(F): 98.1 (07-15-21 @ 21:18), Max: 98.1 (07-15-21 @ 21:18)  HR: 68 (07-16-21 @ 15:53) (55 - 68)  BP: 160/79 (07-16-21 @ 14:11) (139/83 - 160/79)  RR: 16 (07-16-21 @ 14:11) (16 - 16)  SpO2: 98% (07-16-21 @ 15:53) (95% - 100%)    s1s2  b/l air entry  soft, ND  no edema                                                                10.5   4.38  )-----------( 212      ( 15 Jul 2021 05:00 )             32.1     16 Jul 2021 06:30    138    |  105    |  40     ----------------------------<  87     4.3     |  24     |  2.56     Ca    8.5        16 Jul 2021 06:30    TPro  6.8    /  Alb  2.7    /  TBili  0.6    /  DBili  0.1    /  AST  52     /  ALT  53     /  AlkPhos  167    15 Jul 2021 05:00    LIVER FUNCTIONS - ( 15 Jul 2021 05:00 )  Alb: 2.7 g/dL / Pro: 6.8 g/dL / ALK PHOS: 167 U/L / ALT: 53 U/L / AST: 52 U/L / GGT: x           acetaminophen    Suspension .. 650 milliGRAM(s) Oral every 6 hours PRN  albuterol/ipratropium for Nebulization 3 milliLiter(s) Nebulizer every 8 hours  amLODIPine   Tablet 10 milliGRAM(s) Oral <User Schedule>  apixaban 2.5 milliGRAM(s) Oral every 12 hours  atorvastatin 80 milliGRAM(s) Oral at bedtime  bisacodyl 5 milliGRAM(s) Oral every 12 hours PRN  buDESOnide    Inhalation Suspension 0.5 milliGRAM(s) Inhalation every 12 hours  citalopram 20 milliGRAM(s) Oral daily  dextrose 40% Gel 15 Gram(s) Oral once  dextrose 5%. 1000 milliLiter(s) IV Continuous <Continuous>  dextrose 5%. 1000 milliLiter(s) IV Continuous <Continuous>  dextrose 50% Injectable 25 Gram(s) IV Push once  dextrose 50% Injectable 12.5 Gram(s) IV Push once  dextrose 50% Injectable 25 Gram(s) IV Push once  gabapentin 600 milliGRAM(s) Oral at bedtime  glucagon  Injectable 1 milliGRAM(s) IntraMuscular once  guaiFENesin Oral Liquid (Sugar-Free) 100 milliGRAM(s) Oral every 6 hours PRN  hydrALAZINE 75 milliGRAM(s) Oral every 8 hours  insulin glargine Injectable (LANTUS) 5 Unit(s) SubCutaneous at bedtime  insulin lispro (ADMELOG) corrective regimen sliding scale   SubCutaneous three times a day before meals  insulin lispro (ADMELOG) corrective regimen sliding scale   SubCutaneous at bedtime  labetalol 100 milliGRAM(s) Oral two times a day  polyethylene glycol 3350 17 Gram(s) Oral daily  senna 2 Tablet(s) Oral at bedtime    A/P:    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

## 2021-07-16 NOTE — PROGRESS NOTE ADULT - ASSESSMENT
· Assessment	  MARK ANTHONY QUINTANA is a 55M with PMHx of HTN, DM, HLD, ,depression, and prior CVA with no residual deifcits, who presented to [facility] on 06/11/2021 with dysphagia and difficulty ambulating, & dysarthria found to have an acute right corona radiata infarct. Admitted for multidisciplinary rehab program.      #Comprehensive Multidisciplinary Rehab Program:  - Gait, ADL, Functional impairments  - PT/OT/ SLP 3 hours a day 5 days a week  - recreation therapy     #Acute corona radiata infarct and multiple b/l subacute infarcts  - distribution of b/l subacute infarcts suggestive of cardioembolic origin  - not a candidate for tPA or thrombectomy  - c/w Eliquis 2.5mg q12h and atorvastatin 80mg qhs    #cough --Viral URI--Improving  - RVP + for parainfluenza 3  --duoneb PRN  --Pulmicort  --d/w hospitalist   - guaiFENesin prn for the cough.    - VSS.     #RLE Edema  -venous doppler Neg    #HLD  - c/w atorvastatin    #HTN  -- BP controlled -  target <140/90  --cont. Labetalol 100mg q12h on 7/5  - cont. amlodipine 10mg in evening   - Hydralazine increased to 75 mg TID by nephrolology on 7/7  --hospitalist following    #Acute on chronic CKD  -  Cr stable - - probably CKD3  --nephrology following-- note reviewed-- Avoid nephrotoxins  SONO w/o hydro  Renal diet  -- continue to encourage PO hydration--    #DM  - FS and ISS  -Lantus 8 units  -- Adjustment as per hospitalist  - DC home on Sitagliptin 25mg daily (not metformin due to JOSE).      #Neuropathy  - c/w gabapentin 600mg qhs    #Tobacco use  - Nicotine 21mg/24hr    #Mood/Depression  - c/w Citalopram 20mg daily    #Sleep:  - Maintain quiet hours and low stim environment;  --nursing order to turn off TV at 9PM to encourage proper sleep hygiene  - cont.  trazodone    #Pain:  - Tylenol PRN  - avoid sedating meds that may affect cognitive recovery    #GI/Bowel:  - Senna 2 tabs qhs and Miralax PRN  - PO Dulcolax PRN    #/Bladder:  - continent    #Skin/ Pressure Injury Prevention:  - assessment on admission   - Turn Q2hrs in bed while awake, OOB to Chair, PT/OT/SLP     #DVT prophylaxis:  - Eliquis  - SCDs  - neg for b/l LE DVTs at [facility]    #Precautions/ Restrictions  - Falls  - Lungs: Aspiration,   - COVID PCR: neg 6/11    #Dispo: IDR 07/15/2021  - OT: supervision eating/grooming/UBD; CG toilet transfer/shower transfer; CG bathing; min A LBD, Mod A toileting  - PT: CS transfer, CG ambulation 130ft with RW--decreased right foot clearance--was fitted for AFO; Min A 12 steps with 2 hand rails  - SLP: mod-severe  PS, Poor safety and insight, Moderate Memory deficits; needs assist with money management and meds,  visual deficits; (+) dysarthria; reg/thins  .  SW contacted pt's [facility] program --they are processing request to extend HHA hours. family cannot provide supervision   - TDD: DC  home once HHA hours approved    ** Patient presents with right lower extremity weakness and genu recurvatum secondary to an injury when he was 11  He is currently attending rehabilitation at [facility] after a Right Corona Radiata infarct.    He has medical necessity for a right custom molded articulating AFO with a posterior stop at this time.    The custom AFO will reduce the risk of falling by controlling the patient's weakness and foot drop as it will allow the foot to hold in a neutral position during swing phase and permit proper heel contact at the initial phase of stance.    As noted, the brace must include a posterior stop to allow for adjustability.  Pt. requires stabilization of the foot and ankle (because patient's foot drops and scuffs the ground during ambulation) and the patient has the ability to benefit functionally in that he will be able to ambulate with increased safety and decreased risk for falls.    Patient is unable to use a prefabricated brace as because pt. will require use of orthosis permanantly.  Patient remains at risk for falls without the custom AFO.        --------------------------------------------  Outpatient Follow up:  Neurology - Dr. Nena Dumont on 07/22/2021 @ 11AM via telehealth  PCP  ----------------------------------------

## 2021-07-16 NOTE — PROGRESS NOTE ADULT - SUBJECTIVE AND OBJECTIVE BOX
HPI:  MARK ANTHONY QUINTANA is a 55M with PMHx of HTN, DM, HLD, ,depression, and prior CVA with no residual deficits, who presented to [facility] on 06/11/2021 with several days of dysphagia and difficulty ambulating. Patient had been experiencing symptoms since 6/8 after coming home from a dental appointment. He was brought to the hospital after a wellness check from his insurance company. In the ED, his Utox tested positive for cocaine, though patient denied use, and CT head scan showed findings c/w an acute right corona radiata infarct, along with multiple b/l subacute infarcts. Patient was managed medically, as he was not a candidate for either tPA or thrombectomy.    Patient was evaluated by PM&R and therapy for functional deficits and gait/ ADL impairments and recommended acute rehabilitation. Patient was medically optimized for discharge to [facility] on 06/21/2021. Patient was seen and examined at the bedside upon arrival.  (21 Jun 2021 15:04)      PAST MEDICAL & SURGICAL HISTORY:  Cerebrovascular accident (CVA)    Hypertension    Hyperlipidemia    Diabetes mellitus    Depression        Subjective:  coughing and congestion much better.  Pt. in much better mood,  Wants to leave soon.  Making good progress in therapy.  Observed ambulating in PT with supervision with cane.        VITALS  Vital Signs Last 24 Hrs  T(C): 36.7 (15 Jul 2021 21:18), Max: 36.7 (15 Jul 2021 21:18)  T(F): 98.1 (15 Jul 2021 21:18), Max: 98.1 (15 Jul 2021 21:18)  HR: 55 (16 Jul 2021 08:09) (55 - 66)  BP: 139/83 (16 Jul 2021 08:09) (139/83 - 158/92)  BP(mean): --  RR: 16 (16 Jul 2021 08:09) (16 - 16)  SpO2: 100% (16 Jul 2021 08:09) (95% - 100%)    REVIEW OF SYMPTOMS  [Positive: cough, congestion-- improved.  Weakness, cognitive deficits  Denies: headache, lightheadedness, CP, SOB, abdominal pain, dysuria, nausea, constipation      ----------------------------------------------------------------------  PHYSICAL EXAM:    Constitutional - NAD, Comfortable  HEENT - NCAT  Chest -CTA  Cardio - RRR  Abdomen -  Soft, NTND,   Extremities - right leg edema, No calf tenderness   Neurologic Exam:                    Cognitive -             Orientation: alert & awake, Ox 3     Speech - (+) mild dysarthria,  No aphasia     Cranial Nerves - Right pupil sluggish, left pupil reactive, Visual fields slightly impaired on right, flattening of right NLF, EOMI, Shoulder shrug intact, +dysarthria,      Motor -                      LEFT    UE - ShAB 5/5, EF 5/5, EE 5/5, WE 5/5,  WNL                    RIGHT UE - ShAB 5/5, EF 5/5, EE 4/5, WE 5/5,  WNL                    LEFT    LE - HF 5/5, KE 5/5, DF 5/5, PF 5/5                    RIGHT LE - HF 4/5, KF 4/5, KE 5/5, DF 3/5, PF 3-4/5      Sensory - diminished sensation in dorsum and plantar aspect of right foot     Coordination - FTN intact & HTS intact  Psychiatric - mood stable, appropriate    RECENT LABS                        10.5   4.38  )-----------( 212      ( 15 Jul 2021 05:00 )             32.1     07-16    138  |  105  |  40<H>  ----------------------------<  87  4.3   |  24  |  2.56<H>    Ca    8.5      16 Jul 2021 06:30    TPro  6.8  /  Alb  2.7<L>  /  TBili  0.6  /  DBili  0.1  /  AST  52<H>  /  ALT  53<H>  /  AlkPhos  167<H>  07-15            RADIOLOGY/OTHER RESULTS      MEDICATIONS  (STANDING):  albuterol/ipratropium for Nebulization 3 milliLiter(s) Nebulizer every 8 hours  amLODIPine   Tablet 10 milliGRAM(s) Oral <User Schedule>  apixaban 2.5 milliGRAM(s) Oral every 12 hours  atorvastatin 80 milliGRAM(s) Oral at bedtime  buDESOnide    Inhalation Suspension 0.5 milliGRAM(s) Inhalation every 12 hours  citalopram 20 milliGRAM(s) Oral daily  dextrose 40% Gel 15 Gram(s) Oral once  dextrose 5%. 1000 milliLiter(s) (50 mL/Hr) IV Continuous <Continuous>  dextrose 5%. 1000 milliLiter(s) (100 mL/Hr) IV Continuous <Continuous>  dextrose 50% Injectable 25 Gram(s) IV Push once  dextrose 50% Injectable 12.5 Gram(s) IV Push once  dextrose 50% Injectable 25 Gram(s) IV Push once  gabapentin 600 milliGRAM(s) Oral at bedtime  glucagon  Injectable 1 milliGRAM(s) IntraMuscular once  hydrALAZINE 75 milliGRAM(s) Oral every 8 hours  insulin glargine Injectable (LANTUS) 5 Unit(s) SubCutaneous at bedtime  insulin lispro (ADMELOG) corrective regimen sliding scale   SubCutaneous three times a day before meals  insulin lispro (ADMELOG) corrective regimen sliding scale   SubCutaneous at bedtime  labetalol 100 milliGRAM(s) Oral two times a day  polyethylene glycol 3350 17 Gram(s) Oral daily  senna 2 Tablet(s) Oral at bedtime    MEDICATIONS  (PRN):  acetaminophen    Suspension .. 650 milliGRAM(s) Oral every 6 hours PRN Temp greater or equal to 38C (100.4F), Mild Pain (1 - 3)  bisacodyl 5 milliGRAM(s) Oral every 12 hours PRN Constipation  guaiFENesin Oral Liquid (Sugar-Free) 100 milliGRAM(s) Oral every 6 hours PRN Cough

## 2021-07-17 LAB
GLUCOSE BLDC GLUCOMTR-MCNC: 122 MG/DL — HIGH (ref 70–99)
GLUCOSE BLDC GLUCOMTR-MCNC: 135 MG/DL — HIGH (ref 70–99)
GLUCOSE BLDC GLUCOMTR-MCNC: 184 MG/DL — HIGH (ref 70–99)
GLUCOSE BLDC GLUCOMTR-MCNC: 235 MG/DL — HIGH (ref 70–99)

## 2021-07-17 PROCEDURE — 99232 SBSQ HOSP IP/OBS MODERATE 35: CPT

## 2021-07-17 RX ORDER — ALBUTEROL 90 UG/1
2 AEROSOL, METERED ORAL EVERY 6 HOURS
Refills: 0 | Status: DISCONTINUED | OUTPATIENT
Start: 2021-07-17 | End: 2021-07-23

## 2021-07-17 RX ADMIN — AMLODIPINE BESYLATE 10 MILLIGRAM(S): 2.5 TABLET ORAL at 18:20

## 2021-07-17 RX ADMIN — ATORVASTATIN CALCIUM 80 MILLIGRAM(S): 80 TABLET, FILM COATED ORAL at 21:18

## 2021-07-17 RX ADMIN — APIXABAN 2.5 MILLIGRAM(S): 2.5 TABLET, FILM COATED ORAL at 18:20

## 2021-07-17 RX ADMIN — Medication 75 MILLIGRAM(S): at 06:48

## 2021-07-17 RX ADMIN — Medication 0.5 MILLIGRAM(S): at 08:28

## 2021-07-17 RX ADMIN — GABAPENTIN 600 MILLIGRAM(S): 400 CAPSULE ORAL at 21:17

## 2021-07-17 RX ADMIN — INSULIN GLARGINE 5 UNIT(S): 100 INJECTION, SOLUTION SUBCUTANEOUS at 21:18

## 2021-07-17 RX ADMIN — POLYETHYLENE GLYCOL 3350 17 GRAM(S): 17 POWDER, FOR SOLUTION ORAL at 11:51

## 2021-07-17 RX ADMIN — Medication 2: at 16:50

## 2021-07-17 RX ADMIN — Medication 75 MILLIGRAM(S): at 21:17

## 2021-07-17 RX ADMIN — Medication 75 MILLIGRAM(S): at 14:37

## 2021-07-17 RX ADMIN — CITALOPRAM 20 MILLIGRAM(S): 10 TABLET, FILM COATED ORAL at 11:51

## 2021-07-17 RX ADMIN — APIXABAN 2.5 MILLIGRAM(S): 2.5 TABLET, FILM COATED ORAL at 06:48

## 2021-07-17 RX ADMIN — Medication 3 MILLILITER(S): at 08:28

## 2021-07-17 RX ADMIN — Medication 0.5 MILLIGRAM(S): at 21:48

## 2021-07-17 RX ADMIN — Medication 100 MILLIGRAM(S): at 18:20

## 2021-07-17 RX ADMIN — Medication 100 MILLIGRAM(S): at 06:48

## 2021-07-17 NOTE — PROGRESS NOTE ADULT - SUBJECTIVE AND OBJECTIVE BOX
HPI:  MARK ANTHONY QUINTANA is a 55M with PMHx of HTN, DM, HLD, ,depression, and prior CVA with no residual deficits, who presented to [facility] on 06/11/2021 with several days of dysphagia and difficulty ambulating. Patient had been experiencing symptoms since 6/8 after coming home from a dental appointment. He was brought to the hospital after a wellness check from his insurance company. In the ED, his Utox tested positive for cocaine, though patient denied use, and CT head scan showed findings c/w an acute right corona radiata infarct, along with multiple b/l subacute infarcts. Patient was managed medically, as he was not a candidate for either tPA or thrombectomy.    Patient was evaluated by PM&R and therapy for functional deficits and gait/ ADL impairments and recommended acute rehabilitation. Patient was medically optimized for discharge to [facility] on 06/21/2021. Patient was seen and examined at the bedside upon arrival.  (21 Jun 2021 15:04)      Subjective    no new complaints.  Cough better.       PAST MEDICAL & SURGICAL HISTORY:  Cerebrovascular accident (CVA)    Hypertension    Hyperlipidemia    Diabetes mellitus    Depression        MedsMEDICATIONS  (STANDING):  albuterol/ipratropium for Nebulization 3 milliLiter(s) Nebulizer every 8 hours  amLODIPine   Tablet 10 milliGRAM(s) Oral <User Schedule>  apixaban 2.5 milliGRAM(s) Oral every 12 hours  atorvastatin 80 milliGRAM(s) Oral at bedtime  buDESOnide    Inhalation Suspension 0.5 milliGRAM(s) Inhalation every 12 hours  citalopram 20 milliGRAM(s) Oral daily  dextrose 40% Gel 15 Gram(s) Oral once  dextrose 5%. 1000 milliLiter(s) (50 mL/Hr) IV Continuous <Continuous>  dextrose 5%. 1000 milliLiter(s) (100 mL/Hr) IV Continuous <Continuous>  dextrose 50% Injectable 25 Gram(s) IV Push once  dextrose 50% Injectable 12.5 Gram(s) IV Push once  dextrose 50% Injectable 25 Gram(s) IV Push once  gabapentin 600 milliGRAM(s) Oral at bedtime  glucagon  Injectable 1 milliGRAM(s) IntraMuscular once  hydrALAZINE 75 milliGRAM(s) Oral every 8 hours  insulin glargine Injectable (LANTUS) 5 Unit(s) SubCutaneous at bedtime  insulin lispro (ADMELOG) corrective regimen sliding scale   SubCutaneous three times a day before meals  insulin lispro (ADMELOG) corrective regimen sliding scale   SubCutaneous at bedtime  labetalol 100 milliGRAM(s) Oral two times a day  polyethylene glycol 3350 17 Gram(s) Oral daily  senna 2 Tablet(s) Oral at bedtime    MEDICATIONS  (PRN):  acetaminophen    Suspension .. 650 milliGRAM(s) Oral every 6 hours PRN Temp greater or equal to 38C (100.4F), Mild Pain (1 - 3)  bisacodyl 5 milliGRAM(s) Oral every 12 hours PRN Constipation  guaiFENesin Oral Liquid (Sugar-Free) 100 milliGRAM(s) Oral every 6 hours PRN Cough      Vital Signs Last 24 Hrs  T(C): 36.7 (17 Jul 2021 08:09), Max: 36.7 (16 Jul 2021 21:06)  T(F): 98.1 (17 Jul 2021 08:09), Max: 98.1 (17 Jul 2021 08:09)  HR: 70 (17 Jul 2021 08:29) (64 - 79)  BP: 151/81 (17 Jul 2021 08:09) (120/83 - 170/90)  BP(mean): --  RR: 16 (17 Jul 2021 08:09) (16 - 16)  SpO2: 95% (17 Jul 2021 08:29) (95% - 99%)  I&O's Summary    16 Jul 2021 07:01  -  17 Jul 2021 07:00  --------------------------------------------------------  IN: 480 mL / OUT: 0 mL / NET: 480 mL        PHYSICAL EXAM:  GENERAL: NAD  NECK: Supple  NERVOUS SYSTEM:  awake and alert  HEART: S1s2 NL , RRR  CHEST/LUNG: Clear to percussion bilaterally  ABDOMEN: Soft, Nontender, Nondistended; Bowel sounds present  EXTREMITIES:  No edema      LABS:  07-16    138  |  105  |  40<H>  ----------------------------<  87  4.3   |  24  |  2.56<H>    Ca    8.5      16 Jul 2021 06:30            RVP:(07-14 @ 10:50)  Detected            Tox:           CAPILLARY BLOOD GLUCOSE      POCT Blood Glucose.: 135 mg/dL (17 Jul 2021 07:52)  POCT Blood Glucose.: 259 mg/dL (16 Jul 2021 20:56)  POCT Blood Glucose.: 142 mg/dL (16 Jul 2021 17:13)  POCT Blood Glucose.: 172 mg/dL (16 Jul 2021 12:31)      Imaging Personally Reviewed:  [ ] YES  [ ] NO        Care Discussed with Consultants/Other Providers [ x] YES  [ ] NO

## 2021-07-17 NOTE — PROGRESS NOTE ADULT - ASSESSMENT
CVA  Eliquis/Lipitor  PT/OT per rehab    JOSE  Cr stable    HTN  Norvasc, Labetalol, Hydralazine    HLD  lipitor    DM2,  A1C 5.9  Lantus/Lispro    Neuropathy  Gabapentin    Dep  Celexa    Viral URI sec to parainfluenza  Supportive treatment

## 2021-07-17 NOTE — PROGRESS NOTE ADULT - ASSESSMENT
· Assessment	  MARK ANTHONY QUINTANA is a 55M with PMHx of HTN, DM, HLD, ,depression, and prior CVA with no residual deifcits, who presented to [facility] on 06/11/2021 with dysphagia and difficulty ambulating, & dysarthria found to have an acute right corona radiata infarct. Admitted for multidisciplinary rehab program.      #Comprehensive Multidisciplinary Rehab Program:  - Gait, ADL, Functional impairments  - continue PT/OT/ SLP 3 hours a day 5 days a week  - recreation therapy     #Acute corona radiata infarct and multiple b/l subacute infarcts  - distribution of b/l subacute infarcts suggestive of cardioembolic origin  - not a candidate for tPA or thrombectomy  - c/w Eliquis 2.5mg q12h and atorvastatin 80mg qhs    #cough --Viral URI--Improving  - RVP + for parainfluenza 3  --duoneb PRN  --Pulmicort  - guaiFENesin prn for the cough.    - VSS.     #RLE Edema  -venous doppler Neg    #HLD  - c/w atorvastatin    #/81 7/19  -- BP controlled -  target <140/90  --cont. Labetalol 100mg q12h on 7/5  - cont. amlodipine 10mg in evening   - Hydralazine increased to 75 mg TID by nephrolology on 7/7  --hospitalist following    #Acute on chronic CKD  -  Cr stable - - probably CKD3  --nephrology following-- note appreciated- Avoid nephrotoxins  SONO w/o hydro  Renal diet  -- continue to encourage PO hydration--  BMP 7/19    #DM  - FS and ISS  -Lantus 8 units  -- Adjustment as per hospitalist  - DC home on Sitagliptin 25mg daily (not metformin due to JOSE).      #Neuropathy  - c/w gabapentin 600mg qhs    #Tobacco use  - Nicotine 21mg/24hr    #Mood/Depression  - c/w Citalopram 20mg daily    #Sleep:  - Maintain quiet hours and low stim environment;  --nursing order to turn off TV at 9PM to encourage proper sleep hygiene  - cont.  trazodone    #Pain:  - Tylenol PRN  - avoid sedating meds that may affect cognitive recovery    #GI/Bowel:  - Senna 2 tabs qhs and Miralax PRN  - PO Dulcolax PRN    #/Bladder:  - continent    #Skin/ Pressure Injury Prevention:  - assessment on admission   - Turn Q2hrs in bed while awake, OOB to Chair, PT/OT/SLP     #DVT prophylaxis:  - Eliquis  - SCDs  - neg for b/l LE DVTs at [facility]    Labs:  CBC, BMP 7/19  Hepatic panel 7/19

## 2021-07-17 NOTE — PROGRESS NOTE ADULT - SUBJECTIVE AND OBJECTIVE BOX
HPI:  MARK ANTHONY QUINTANA is a 55M with PMHx of HTN, DM, HLD, ,depression, and prior CVA with no residual deficits, who presented to [facility] on 06/11/2021 with several days of dysphagia and difficulty ambulating. Patient had been experiencing symptoms since 6/8 after coming home from a dental appointment. He was brought to the hospital after a wellness check from his insurance company. In the ED, his Utox tested positive for cocaine, though patient denied use, and CT head scan showed findings c/w an acute right corona radiata infarct, along with multiple b/l subacute infarcts. Patient was managed medically, as he was not a candidate for either tPA or thrombectomy.    Patient was evaluated by PM&R and therapy for functional deficits and gait/ ADL impairments and recommended acute rehabilitation. Patient was medically optimized for discharge to [facility] on 06/21/2021. Patient was seen and examined at the bedside upon arrival.  (21 Jun 2021 15:04)      PAST MEDICAL & SURGICAL HISTORY:  Cerebrovascular accident (CVA)    Hypertension    Hyperlipidemia    Diabetes mellitus    Depression        Subjective:  Patient seen at bedside this morning. Patient in NAD, no SOB, no n/v, no pain. Did not endorse cough.        REVIEW OF SYMPTOMS  [Positive: cough, congestion-- improved.  Weakness, cognitive deficits  Denies: headache, lightheadedness, CP, SOB, abdominal pain, dysuria      ----------------------------------------------------------------------  PHYSICAL EXAM:    Vital Signs Last 24 Hrs  T(C): 36.7 (17 Jul 2021 08:09), Max: 36.7 (16 Jul 2021 21:06)  T(F): 98.1 (17 Jul 2021 08:09), Max: 98.1 (17 Jul 2021 08:09)  HR: 70 (17 Jul 2021 08:29) (64 - 79)  BP: 151/81 (17 Jul 2021 08:09) (120/83 - 170/90)  BP(mean): --  RR: 16 (17 Jul 2021 08:09) (16 - 16)  SpO2: 95% (17 Jul 2021 08:29) (95% - 99%)    Constitutional - NAD, Comfortable  HEENT - NCAT, MMM  Chest -CTA  Cardio - RRR  Abdomen -  Soft, NTND,   Extremities - right leg edema, No calf tenderness   Neurologic Exam:                    Cognitive -             Orientation: alert & awake, Ox 3     Speech - (+) mild dysarthria,  No aphasia          Motor -                      LEFT    UE - ShAB 5/5, EF 5/5, EE 5/5, WE 5/5,  WNL                    RIGHT UE - ShAB 5/5, EF 5/5, EE 4/5, WE 5/5,  WNL                    LEFT    LE - HF 5/5, KE 5/5, DF 5/5, PF 5/5                    RIGHT LE - HF 5/5, KF 5/5, KE 5/5, DF 4/5, PF 4/5      Sensory - diminished sensation in dorsum and plantar aspect of right foot       Psychiatric - mood stable, appropriate    RECENT LABS    LABS:      Ca    8.5        16 Jul 2021 06:30                          10.5   4.38  )-----------( 212      ( 15 Jul 2021 05:00 )             32.1     07-16    138  |  105  |  40<H>  ----------------------------<  87  4.3   |  24  |  2.56<H>    Ca    8.5      16 Jul 2021 06:30    TPro  6.8  /  Alb  2.7<L>  /  TBili  0.6  /  DBili  0.1  /  AST  52<H>  /  ALT  53<H>  /  AlkPhos  167<H>  07-15            RADIOLOGY/OTHER RESULTS      MEDICATIONS  (STANDING):  albuterol/ipratropium for Nebulization 3 milliLiter(s) Nebulizer every 8 hours  amLODIPine   Tablet 10 milliGRAM(s) Oral <User Schedule>  apixaban 2.5 milliGRAM(s) Oral every 12 hours  atorvastatin 80 milliGRAM(s) Oral at bedtime  buDESOnide    Inhalation Suspension 0.5 milliGRAM(s) Inhalation every 12 hours  citalopram 20 milliGRAM(s) Oral daily  dextrose 40% Gel 15 Gram(s) Oral once  dextrose 5%. 1000 milliLiter(s) (50 mL/Hr) IV Continuous <Continuous>  dextrose 5%. 1000 milliLiter(s) (100 mL/Hr) IV Continuous <Continuous>  dextrose 50% Injectable 25 Gram(s) IV Push once  dextrose 50% Injectable 12.5 Gram(s) IV Push once  dextrose 50% Injectable 25 Gram(s) IV Push once  gabapentin 600 milliGRAM(s) Oral at bedtime  glucagon  Injectable 1 milliGRAM(s) IntraMuscular once  hydrALAZINE 75 milliGRAM(s) Oral every 8 hours  insulin glargine Injectable (LANTUS) 5 Unit(s) SubCutaneous at bedtime  insulin lispro (ADMELOG) corrective regimen sliding scale   SubCutaneous three times a day before meals  insulin lispro (ADMELOG) corrective regimen sliding scale   SubCutaneous at bedtime  labetalol 100 milliGRAM(s) Oral two times a day  polyethylene glycol 3350 17 Gram(s) Oral daily  senna 2 Tablet(s) Oral at bedtime    MEDICATIONS  (PRN):  acetaminophen    Suspension .. 650 milliGRAM(s) Oral every 6 hours PRN Temp greater or equal to 38C (100.4F), Mild Pain (1 - 3)  bisacodyl 5 milliGRAM(s) Oral every 12 hours PRN Constipation  guaiFENesin Oral Liquid (Sugar-Free) 100 milliGRAM(s) Oral every 6 hours PRN Cough

## 2021-07-18 LAB
GLUCOSE BLDC GLUCOMTR-MCNC: 109 MG/DL — HIGH (ref 70–99)
GLUCOSE BLDC GLUCOMTR-MCNC: 120 MG/DL — HIGH (ref 70–99)
GLUCOSE BLDC GLUCOMTR-MCNC: 145 MG/DL — HIGH (ref 70–99)
GLUCOSE BLDC GLUCOMTR-MCNC: 198 MG/DL — HIGH (ref 70–99)

## 2021-07-18 PROCEDURE — 99232 SBSQ HOSP IP/OBS MODERATE 35: CPT

## 2021-07-18 RX ADMIN — Medication 75 MILLIGRAM(S): at 14:31

## 2021-07-18 RX ADMIN — Medication 100 MILLIGRAM(S): at 06:11

## 2021-07-18 RX ADMIN — Medication 75 MILLIGRAM(S): at 06:12

## 2021-07-18 RX ADMIN — APIXABAN 2.5 MILLIGRAM(S): 2.5 TABLET, FILM COATED ORAL at 06:11

## 2021-07-18 RX ADMIN — CITALOPRAM 20 MILLIGRAM(S): 10 TABLET, FILM COATED ORAL at 11:55

## 2021-07-18 RX ADMIN — AMLODIPINE BESYLATE 10 MILLIGRAM(S): 2.5 TABLET ORAL at 17:14

## 2021-07-18 RX ADMIN — Medication 0.5 MILLIGRAM(S): at 08:09

## 2021-07-18 RX ADMIN — APIXABAN 2.5 MILLIGRAM(S): 2.5 TABLET, FILM COATED ORAL at 17:15

## 2021-07-18 RX ADMIN — Medication 75 MILLIGRAM(S): at 21:32

## 2021-07-18 RX ADMIN — GABAPENTIN 600 MILLIGRAM(S): 400 CAPSULE ORAL at 21:32

## 2021-07-18 RX ADMIN — INSULIN GLARGINE 5 UNIT(S): 100 INJECTION, SOLUTION SUBCUTANEOUS at 21:33

## 2021-07-18 RX ADMIN — Medication 1: at 11:55

## 2021-07-18 RX ADMIN — Medication 100 MILLIGRAM(S): at 17:15

## 2021-07-18 RX ADMIN — ATORVASTATIN CALCIUM 80 MILLIGRAM(S): 80 TABLET, FILM COATED ORAL at 21:32

## 2021-07-18 NOTE — PROGRESS NOTE ADULT - SUBJECTIVE AND OBJECTIVE BOX
HPI:  MARK ANTHONY QUINTANA is a 55M with PMHx of HTN, DM, HLD, ,depression, and prior CVA with no residual deficits, who presented to [facility] on 06/11/2021 with several days of dysphagia and difficulty ambulating. Patient had been experiencing symptoms since 6/8 after coming home from a dental appointment. He was brought to the hospital after a wellness check from his insurance company. In the ED, his Utox tested positive for cocaine, though patient denied use, and CT head scan showed findings c/w an acute right corona radiata infarct, along with multiple b/l subacute infarcts. Patient was managed medically, as he was not a candidate for either tPA or thrombectomy.    Patient was evaluated by PM&R and therapy for functional deficits and gait/ ADL impairments and recommended acute rehabilitation. Patient was medically optimized for discharge to [facility] on 06/21/2021. Patient was seen and examined at the bedside upon arrival.  (21 Jun 2021 15:04)      PAST MEDICAL & SURGICAL HISTORY:  Cerebrovascular accident (CVA)    Hypertension    Hyperlipidemia    Diabetes mellitus    Depression        Subjective:  Patient seen at bedside this morning. Patient in NAD, no SOB, no n/v, no pain. Did not endorse cough.        REVIEW OF SYMPTOMS  Denies: headache, lightheadedness, CP, SOB, abdominal pain, dysuria      ----------------------------------------------------------------------  PHYSICAL EXAM:    Vital Signs Last 24 Hrs  T(C): 37 (18 Jul 2021 07:52), Max: 37 (18 Jul 2021 07:52)  T(F): 98.6 (18 Jul 2021 07:52), Max: 98.6 (18 Jul 2021 07:52)  HR: 71 (18 Jul 2021 07:52) (64 - 82)  BP: 149/90 (18 Jul 2021 07:52) (149/90 - 166/95)  BP(mean): --  RR: 16 (18 Jul 2021 07:52) (16 - 16)  SpO2: 96% (18 Jul 2021 07:52) (95% - 98%)    Constitutional - NAD, Comfortable  HEENT - NCAT, MMM  Chest -CTA  Cardio - RRR  Abdomen -  Soft, NTND,   Extremities - right leg edema, No calf tenderness   Neurologic Exam:                    Cognitive -             Orientation: alert & awake, Ox 3     Speech - (+) mild dysarthria,  No aphasia          Motor -                      LEFT    UE - ShAB 5/5, EF 5/5, EE 5/5, WE 5/5,  WNL                    RIGHT UE - ShAB 5/5, EF 5/5, EE 4/5, WE 5/5,  WNL                    LEFT    LE - HF 5/5, KE 5/5, DF 5/5, PF 5/5                    RIGHT LE - HF 5/5, KF 5/5, KE 5/5, DF 4+/5, PF 4+/5      Sensory - diminished sensation in dorsum and plantar aspect of right foot       Psychiatric - mood stable, appropriate    RECENT LABS    LABS:      Ca    8.5        16 Jul 2021 06:30                          10.5   4.38  )-----------( 212      ( 15 Jul 2021 05:00 )             32.1     07-16    138  |  105  |  40<H>  ----------------------------<  87  4.3   |  24  |  2.56<H>    Ca    8.5      16 Jul 2021 06:30    TPro  6.8  /  Alb  2.7<L>  /  TBili  0.6  /  DBili  0.1  /  AST  52<H>  /  ALT  53<H>  /  AlkPhos  167<H>  07-15            RADIOLOGY/OTHER RESULTS      MEDICATIONS  (STANDING):  albuterol/ipratropium for Nebulization 3 milliLiter(s) Nebulizer every 8 hours  amLODIPine   Tablet 10 milliGRAM(s) Oral <User Schedule>  apixaban 2.5 milliGRAM(s) Oral every 12 hours  atorvastatin 80 milliGRAM(s) Oral at bedtime  buDESOnide    Inhalation Suspension 0.5 milliGRAM(s) Inhalation every 12 hours  citalopram 20 milliGRAM(s) Oral daily  dextrose 40% Gel 15 Gram(s) Oral once  dextrose 5%. 1000 milliLiter(s) (50 mL/Hr) IV Continuous <Continuous>  dextrose 5%. 1000 milliLiter(s) (100 mL/Hr) IV Continuous <Continuous>  dextrose 50% Injectable 25 Gram(s) IV Push once  dextrose 50% Injectable 12.5 Gram(s) IV Push once  dextrose 50% Injectable 25 Gram(s) IV Push once  gabapentin 600 milliGRAM(s) Oral at bedtime  glucagon  Injectable 1 milliGRAM(s) IntraMuscular once  hydrALAZINE 75 milliGRAM(s) Oral every 8 hours  insulin glargine Injectable (LANTUS) 5 Unit(s) SubCutaneous at bedtime  insulin lispro (ADMELOG) corrective regimen sliding scale   SubCutaneous three times a day before meals  insulin lispro (ADMELOG) corrective regimen sliding scale   SubCutaneous at bedtime  labetalol 100 milliGRAM(s) Oral two times a day  polyethylene glycol 3350 17 Gram(s) Oral daily  senna 2 Tablet(s) Oral at bedtime    MEDICATIONS  (PRN):  acetaminophen    Suspension .. 650 milliGRAM(s) Oral every 6 hours PRN Temp greater or equal to 38C (100.4F), Mild Pain (1 - 3)  bisacodyl 5 milliGRAM(s) Oral every 12 hours PRN Constipation  guaiFENesin Oral Liquid (Sugar-Free) 100 milliGRAM(s) Oral every 6 hours PRN Cough

## 2021-07-18 NOTE — PROGRESS NOTE ADULT - ASSESSMENT
CVA  Eliquis/Lipitor  PT/OT per rehab    JOSE  Cr stable    HTN  Norvasc, Labetalol, Hydralazine    HLD  lipitor    DM2,  A1C 5.9  Lantus/Lispro    Neuropathy  Gabapentin    Dep  Celexa    Viral URI sec to parainfluenza  Supportive treatment   CVA  Eliquis/Lipitor  PT/OT per rehab    JOSE  Cr stable    HTN  Norvasc, Labetalol, Hydralazine    HLD  lipitor    DM2,  A1C 5.9  Lantus/Lispro    Neuropathy  Gabapentin    Dep  Celexa    Viral URI sec to parainfluenza  Clinically improving  Supportive treatment

## 2021-07-18 NOTE — PROGRESS NOTE ADULT - ASSESSMENT
· Assessment	  MARK ANTHONY QUINTANA is a 55M with PMHx of HTN, DM, HLD, ,depression, and prior CVA with no residual deifcits, who presented to [facility] on 06/11/2021 with dysphagia and difficulty ambulating, & dysarthria found to have an acute right corona radiata infarct. Admitted for multidisciplinary rehab program.      #Comprehensive Multidisciplinary Rehab Program:  - Gait, ADL, Functional impairments  - continue PT/OT/ SLP 3 hours a day 5 days a week  - recreation therapy     #Acute corona radiata infarct and multiple b/l subacute infarcts  - distribution of b/l subacute infarcts suggestive of cardioembolic origin  - not a candidate for tPA or thrombectomy  - c/w Eliquis 2.5mg q12h and atorvastatin 80mg qhs    #cough --Viral URI--Improving  - RVP + for parainfluenza 3  --duoneb PRN  --Pulmicort  - guaiFENesin prn for the cough.    - VSS.     #RLE Edema  -venous doppler Neg    #HLD  - c/w atorvastatin    #/90 7/18  -- BP controlled -  target <140/90  --cont. Labetalol 100mg q12h on 7/5  - cont. amlodipine 10mg in evening   - Hydralazine increased to 75 mg TID by nephrolology on 7/7  --hospitalist following    #Acute on chronic CKD  -  Cr stable - - probably CKD3  --nephrology following-- note appreciated- Avoid nephrotoxins  SONO w/o hydro  Renal diet  -- continue to encourage PO hydration--  BMP 7/19    #DM  - FS and ISS  -Lantus 8 units  -- Adjustment as per hospitalist  - DC home on Sitagliptin 25mg daily (not metformin due to JOSE).      #Neuropathy  - c/w gabapentin 600mg qhs    #Tobacco use  - Nicotine 21mg/24hr    #Mood/Depression  - c/w Citalopram 20mg daily    #Sleep:  - Maintain quiet hours and low stim environment;  --nursing order to turn off TV at 9PM to encourage proper sleep hygiene  - cont.  trazodone    #Pain:  - Tylenol PRN  - avoid sedating meds that may affect cognitive recovery    #GI/Bowel:  - Senna 2 tabs qhs and Miralax PRN  - PO Dulcolax PRN    #/Bladder:  - continent    #Skin/ Pressure Injury Prevention:  - assessment on admission   - Turn Q2hrs in bed while awake, OOB to Chair, PT/OT/SLP     #DVT prophylaxis:  - Eliquis  - SCDs  - neg for b/l LE DVTs at [facility]    Labs:  CBC, BMP 7/19  Hepatic panel 7/19

## 2021-07-18 NOTE — PROGRESS NOTE ADULT - SUBJECTIVE AND OBJECTIVE BOX
HPI:  MARK ANTHONY QUINTANA is a 55M with PMHx of HTN, DM, HLD, ,depression, and prior CVA with no residual deficits, who presented to [facility] on 06/11/2021 with several days of dysphagia and difficulty ambulating. Patient had been experiencing symptoms since 6/8 after coming home from a dental appointment. He was brought to the hospital after a wellness check from his insurance company. In the ED, his Utox tested positive for cocaine, though patient denied use, and CT head scan showed findings c/w an acute right corona radiata infarct, along with multiple b/l subacute infarcts. Patient was managed medically, as he was not a candidate for either tPA or thrombectomy.    Patient was evaluated by PM&R and therapy for functional deficits and gait/ ADL impairments and recommended acute rehabilitation. Patient was medically optimized for discharge to [facility] on 06/21/2021. Patient was seen and examined at the bedside upon arrival.  (21 Jun 2021 15:04)      Subjective          PAST MEDICAL & SURGICAL HISTORY:  Cerebrovascular accident (CVA)    Hypertension    Hyperlipidemia    Diabetes mellitus    Depression        MedsMEDICATIONS  (STANDING):  amLODIPine   Tablet 10 milliGRAM(s) Oral <User Schedule>  apixaban 2.5 milliGRAM(s) Oral every 12 hours  atorvastatin 80 milliGRAM(s) Oral at bedtime  buDESOnide    Inhalation Suspension 0.5 milliGRAM(s) Inhalation every 12 hours  citalopram 20 milliGRAM(s) Oral daily  dextrose 40% Gel 15 Gram(s) Oral once  dextrose 5%. 1000 milliLiter(s) (50 mL/Hr) IV Continuous <Continuous>  dextrose 5%. 1000 milliLiter(s) (100 mL/Hr) IV Continuous <Continuous>  dextrose 50% Injectable 25 Gram(s) IV Push once  dextrose 50% Injectable 12.5 Gram(s) IV Push once  dextrose 50% Injectable 25 Gram(s) IV Push once  gabapentin 600 milliGRAM(s) Oral at bedtime  glucagon  Injectable 1 milliGRAM(s) IntraMuscular once  hydrALAZINE 75 milliGRAM(s) Oral every 8 hours  insulin glargine Injectable (LANTUS) 5 Unit(s) SubCutaneous at bedtime  insulin lispro (ADMELOG) corrective regimen sliding scale   SubCutaneous three times a day before meals  insulin lispro (ADMELOG) corrective regimen sliding scale   SubCutaneous at bedtime  labetalol 100 milliGRAM(s) Oral two times a day  polyethylene glycol 3350 17 Gram(s) Oral daily  senna 2 Tablet(s) Oral at bedtime    MEDICATIONS  (PRN):  acetaminophen    Suspension .. 650 milliGRAM(s) Oral every 6 hours PRN Temp greater or equal to 38C (100.4F), Mild Pain (1 - 3)  ALBUTerol    90 MICROgram(s) HFA Inhaler 2 Puff(s) Inhalation every 6 hours PRN Shortness of Breath and/or Wheezing  bisacodyl 5 milliGRAM(s) Oral every 12 hours PRN Constipation  guaiFENesin Oral Liquid (Sugar-Free) 100 milliGRAM(s) Oral every 6 hours PRN Cough      Vital Signs Last 24 Hrs  T(C): 37 (18 Jul 2021 07:52), Max: 37 (18 Jul 2021 07:52)  T(F): 98.6 (18 Jul 2021 07:52), Max: 98.6 (18 Jul 2021 07:52)  HR: 71 (18 Jul 2021 07:52) (65 - 82)  BP: 149/90 (18 Jul 2021 07:52) (149/90 - 166/95)  BP(mean): --  RR: 16 (18 Jul 2021 07:52) (16 - 16)  SpO2: 96% (18 Jul 2021 07:52) (96% - 98%)  I&O's Summary    17 Jul 2021 07:01  -  18 Jul 2021 07:00  --------------------------------------------------------  IN: 0 mL / OUT: 650 mL / NET: -650 mL        PHYSICAL EXAM:  GENERAL: NAD  NECK: Supple  NERVOUS SYSTEM:  awake and alert  HEART: S1s2 NL , RRR  CHEST/LUNG: Clear to percussion bilaterally  ABDOMEN: Soft, Nontender, Nondistended; Bowel sounds present  EXTREMITIES:  No edema      LABS:              RVP:(07-14 @ 10:50)  Detected            Tox:           CAPILLARY BLOOD GLUCOSE      POCT Blood Glucose.: 145 mg/dL (18 Jul 2021 07:49)  POCT Blood Glucose.: 184 mg/dL (17 Jul 2021 21:15)  POCT Blood Glucose.: 235 mg/dL (17 Jul 2021 16:47)  POCT Blood Glucose.: 122 mg/dL (17 Jul 2021 11:44)      Imaging Personally Reviewed:  [ ] YES  [ ] NO        Care Discussed with Consultants/Other Providers [ x] YES  [ ] NO       HPI:  MARK ANTHONY QUINTANA is a 55M with PMHx of HTN, DM, HLD, ,depression, and prior CVA with no residual deficits, who presented to [facility] on 06/11/2021 with several days of dysphagia and difficulty ambulating. Patient had been experiencing symptoms since 6/8 after coming home from a dental appointment. He was brought to the hospital after a wellness check from his insurance company. In the ED, his Utox tested positive for cocaine, though patient denied use, and CT head scan showed findings c/w an acute right corona radiata infarct, along with multiple b/l subacute infarcts. Patient was managed medically, as he was not a candidate for either tPA or thrombectomy.    Patient was evaluated by PM&R and therapy for functional deficits and gait/ ADL impairments and recommended acute rehabilitation. Patient was medically optimized for discharge to [facility] on 06/21/2021. Patient was seen and examined at the bedside upon arrival.  (21 Jun 2021 15:04)      Subjective      No new complaints. Denies Cough, SOB.       PAST MEDICAL & SURGICAL HISTORY:  Cerebrovascular accident (CVA)    Hypertension    Hyperlipidemia    Diabetes mellitus    Depression        MedsMEDICATIONS  (STANDING):  amLODIPine   Tablet 10 milliGRAM(s) Oral <User Schedule>  apixaban 2.5 milliGRAM(s) Oral every 12 hours  atorvastatin 80 milliGRAM(s) Oral at bedtime  buDESOnide    Inhalation Suspension 0.5 milliGRAM(s) Inhalation every 12 hours  citalopram 20 milliGRAM(s) Oral daily  dextrose 40% Gel 15 Gram(s) Oral once  dextrose 5%. 1000 milliLiter(s) (50 mL/Hr) IV Continuous <Continuous>  dextrose 5%. 1000 milliLiter(s) (100 mL/Hr) IV Continuous <Continuous>  dextrose 50% Injectable 25 Gram(s) IV Push once  dextrose 50% Injectable 12.5 Gram(s) IV Push once  dextrose 50% Injectable 25 Gram(s) IV Push once  gabapentin 600 milliGRAM(s) Oral at bedtime  glucagon  Injectable 1 milliGRAM(s) IntraMuscular once  hydrALAZINE 75 milliGRAM(s) Oral every 8 hours  insulin glargine Injectable (LANTUS) 5 Unit(s) SubCutaneous at bedtime  insulin lispro (ADMELOG) corrective regimen sliding scale   SubCutaneous three times a day before meals  insulin lispro (ADMELOG) corrective regimen sliding scale   SubCutaneous at bedtime  labetalol 100 milliGRAM(s) Oral two times a day  polyethylene glycol 3350 17 Gram(s) Oral daily  senna 2 Tablet(s) Oral at bedtime    MEDICATIONS  (PRN):  acetaminophen    Suspension .. 650 milliGRAM(s) Oral every 6 hours PRN Temp greater or equal to 38C (100.4F), Mild Pain (1 - 3)  ALBUTerol    90 MICROgram(s) HFA Inhaler 2 Puff(s) Inhalation every 6 hours PRN Shortness of Breath and/or Wheezing  bisacodyl 5 milliGRAM(s) Oral every 12 hours PRN Constipation  guaiFENesin Oral Liquid (Sugar-Free) 100 milliGRAM(s) Oral every 6 hours PRN Cough      Vital Signs Last 24 Hrs  T(C): 37 (18 Jul 2021 07:52), Max: 37 (18 Jul 2021 07:52)  T(F): 98.6 (18 Jul 2021 07:52), Max: 98.6 (18 Jul 2021 07:52)  HR: 71 (18 Jul 2021 07:52) (65 - 82)  BP: 149/90 (18 Jul 2021 07:52) (149/90 - 166/95)  BP(mean): --  RR: 16 (18 Jul 2021 07:52) (16 - 16)  SpO2: 96% (18 Jul 2021 07:52) (96% - 98%)  I&O's Summary    17 Jul 2021 07:01  -  18 Jul 2021 07:00  --------------------------------------------------------  IN: 0 mL / OUT: 650 mL / NET: -650 mL        PHYSICAL EXAM:  GENERAL: NAD  NECK: Supple  NERVOUS SYSTEM:  awake and alert  HEART: S1s2 NL , RRR  CHEST/LUNG: Clear to percussion bilaterally  ABDOMEN: Soft, Nontender, Nondistended; Bowel sounds present  EXTREMITIES:  No edema      LABS:              RVP:(07-14 @ 10:50)  Detected            Tox:           CAPILLARY BLOOD GLUCOSE      POCT Blood Glucose.: 145 mg/dL (18 Jul 2021 07:49)  POCT Blood Glucose.: 184 mg/dL (17 Jul 2021 21:15)  POCT Blood Glucose.: 235 mg/dL (17 Jul 2021 16:47)  POCT Blood Glucose.: 122 mg/dL (17 Jul 2021 11:44)      Imaging Personally Reviewed:  [ ] YES  [ ] NO        Care Discussed with Consultants/Other Providers [ x] YES  [ ] NO

## 2021-07-19 LAB
ALBUMIN SERPL ELPH-MCNC: 2.7 G/DL — LOW (ref 3.3–5)
ALP SERPL-CCNC: 165 U/L — HIGH (ref 40–120)
ALT FLD-CCNC: 32 U/L — SIGNIFICANT CHANGE UP (ref 10–45)
ANION GAP SERPL CALC-SCNC: 9 MMOL/L — SIGNIFICANT CHANGE UP (ref 5–17)
AST SERPL-CCNC: 22 U/L — SIGNIFICANT CHANGE UP (ref 10–40)
BILIRUB DIRECT SERPL-MCNC: 0.1 MG/DL — SIGNIFICANT CHANGE UP (ref 0–0.2)
BILIRUB INDIRECT FLD-MCNC: 0.4 MG/DL — SIGNIFICANT CHANGE UP (ref 0.2–1)
BILIRUB SERPL-MCNC: 0.5 MG/DL — SIGNIFICANT CHANGE UP (ref 0.2–1.2)
BUN SERPL-MCNC: 46 MG/DL — HIGH (ref 7–23)
CALCIUM SERPL-MCNC: 8.3 MG/DL — LOW (ref 8.4–10.5)
CHLORIDE SERPL-SCNC: 106 MMOL/L — SIGNIFICANT CHANGE UP (ref 96–108)
CO2 SERPL-SCNC: 23 MMOL/L — SIGNIFICANT CHANGE UP (ref 22–31)
CREAT SERPL-MCNC: 2.72 MG/DL — HIGH (ref 0.5–1.3)
GLUCOSE BLDC GLUCOMTR-MCNC: 113 MG/DL — HIGH (ref 70–99)
GLUCOSE BLDC GLUCOMTR-MCNC: 145 MG/DL — HIGH (ref 70–99)
GLUCOSE BLDC GLUCOMTR-MCNC: 147 MG/DL — HIGH (ref 70–99)
GLUCOSE BLDC GLUCOMTR-MCNC: 174 MG/DL — HIGH (ref 70–99)
GLUCOSE SERPL-MCNC: 99 MG/DL — SIGNIFICANT CHANGE UP (ref 70–99)
HCT VFR BLD CALC: 31.5 % — LOW (ref 39–50)
HGB BLD-MCNC: 10.2 G/DL — LOW (ref 13–17)
MCHC RBC-ENTMCNC: 29.1 PG — SIGNIFICANT CHANGE UP (ref 27–34)
MCHC RBC-ENTMCNC: 32.4 GM/DL — SIGNIFICANT CHANGE UP (ref 32–36)
MCV RBC AUTO: 90 FL — SIGNIFICANT CHANGE UP (ref 80–100)
NRBC # BLD: 0 /100 WBCS — SIGNIFICANT CHANGE UP (ref 0–0)
PLATELET # BLD AUTO: 229 K/UL — SIGNIFICANT CHANGE UP (ref 150–400)
POTASSIUM SERPL-MCNC: 4.5 MMOL/L — SIGNIFICANT CHANGE UP (ref 3.5–5.3)
POTASSIUM SERPL-SCNC: 4.5 MMOL/L — SIGNIFICANT CHANGE UP (ref 3.5–5.3)
PROT SERPL-MCNC: 6.8 G/DL — SIGNIFICANT CHANGE UP (ref 6–8.3)
RBC # BLD: 3.5 M/UL — LOW (ref 4.2–5.8)
RBC # FLD: 11.2 % — SIGNIFICANT CHANGE UP (ref 10.3–14.5)
SODIUM SERPL-SCNC: 138 MMOL/L — SIGNIFICANT CHANGE UP (ref 135–145)
WBC # BLD: 4.98 K/UL — SIGNIFICANT CHANGE UP (ref 3.8–10.5)
WBC # FLD AUTO: 4.98 K/UL — SIGNIFICANT CHANGE UP (ref 3.8–10.5)

## 2021-07-19 PROCEDURE — 99232 SBSQ HOSP IP/OBS MODERATE 35: CPT

## 2021-07-19 RX ORDER — FUROSEMIDE 40 MG
40 TABLET ORAL DAILY
Refills: 0 | Status: DISCONTINUED | OUTPATIENT
Start: 2021-07-19 | End: 2021-07-23

## 2021-07-19 RX ADMIN — GABAPENTIN 600 MILLIGRAM(S): 400 CAPSULE ORAL at 22:22

## 2021-07-19 RX ADMIN — AMLODIPINE BESYLATE 10 MILLIGRAM(S): 2.5 TABLET ORAL at 18:34

## 2021-07-19 RX ADMIN — Medication 75 MILLIGRAM(S): at 06:16

## 2021-07-19 RX ADMIN — Medication 0.5 MILLIGRAM(S): at 08:14

## 2021-07-19 RX ADMIN — CITALOPRAM 20 MILLIGRAM(S): 10 TABLET, FILM COATED ORAL at 11:51

## 2021-07-19 RX ADMIN — APIXABAN 2.5 MILLIGRAM(S): 2.5 TABLET, FILM COATED ORAL at 06:15

## 2021-07-19 RX ADMIN — Medication 75 MILLIGRAM(S): at 22:23

## 2021-07-19 RX ADMIN — Medication 1: at 11:53

## 2021-07-19 RX ADMIN — INSULIN GLARGINE 5 UNIT(S): 100 INJECTION, SOLUTION SUBCUTANEOUS at 22:23

## 2021-07-19 RX ADMIN — APIXABAN 2.5 MILLIGRAM(S): 2.5 TABLET, FILM COATED ORAL at 18:34

## 2021-07-19 RX ADMIN — Medication 100 MILLIGRAM(S): at 18:34

## 2021-07-19 RX ADMIN — Medication 100 MILLIGRAM(S): at 06:16

## 2021-07-19 RX ADMIN — Medication 0.5 MILLIGRAM(S): at 20:37

## 2021-07-19 RX ADMIN — ATORVASTATIN CALCIUM 80 MILLIGRAM(S): 80 TABLET, FILM COATED ORAL at 22:22

## 2021-07-19 RX ADMIN — Medication 75 MILLIGRAM(S): at 14:53

## 2021-07-19 NOTE — PROGRESS NOTE ADULT - ASSESSMENT
· Assessment	  MARK ANTHONY QUINTANA is a 55M with PMHx of HTN, DM, HLD, ,depression, and prior CVA with no residual deifcits, who presented to [facility] on 06/11/2021 with dysphagia and difficulty ambulating, & dysarthria found to have an acute right corona radiata infarct. Admitted for multidisciplinary rehab program.      #Comprehensive Multidisciplinary Rehab Program:  - Gait, ADL, Functional impairments  - continue PT/OT/ SLP 3 hours a day 5 days a week  - recreation therapy     #Acute corona radiata infarct and multiple b/l subacute infarcts  - distribution of b/l subacute infarcts suggestive of cardioembolic origin  - not a candidate for tPA or thrombectomy  - c/w Eliquis 2.5mg q12h and atorvastatin 80mg qhs    #cough --Viral URI--Improved  - RVP + for parainfluenza 3  --duoneb PRN  --Pulmicort  - guaiFENesin prn for the cough.    - VSS.     #RLE Edema  -venous doppler Neg    #HLD  - c/w atorvastatin    #HTN  -- BP elevated -  target <140/90  --d/w hospitalist-- Dr. Shane-- will adjust BP meds.    --cont. Labetalol 100mg q12h on 7/5  - cont. amlodipine 10mg in evening   - Hydralazine increased to 75 mg TID by nephrolology on 7/7  --hospitalist following    #Acute on chronic CKD  -  Cr stable - - probably CKD3  --nephrology following-- note appreciated- Avoid nephrotoxins  SONO w/o hydro  Renal diet  -- continue to encourage PO hydration--      #DM  - FS and ISS  -Lantus 8 units  -- Adjustment as per hospitalist  - DC home on Sitagliptin 25mg daily (not metformin due to JOSE).      #Neuropathy  - c/w gabapentin 600mg qhs    #Tobacco use  - Nicotine 21mg/24hr    #Mood/Depression  - c/w Citalopram 20mg daily    #Sleep:  - Maintain quiet hours and low stim environment;  --nursing order to turn off TV at 9PM to encourage proper sleep hygiene  - cont.  trazodone    #Pain:  - Tylenol PRN  - avoid sedating meds that may affect cognitive recovery    #GI/Bowel:  - Senna 2 tabs qhs and Miralax PRN  - PO Dulcolax PRN    #/Bladder:  - continent    #Skin/ Pressure Injury Prevention:  - assessment on admission   - Turn Q2hrs in bed while awake, OOB to Chair, PT/OT/SLP     #DVT prophylaxis:  - Eliquis  - SCDs  - neg for b/l LE DVTs at [facility]    Labs:  CBC, BMP 7/19  Hepatic panel 7/19      #Dispo: IDR 07/15/2021  - OT: supervision eating/grooming/UBD; CG toilet transfer/shower transfer; CG bathing; min A LBD, Mod A toileting  - PT: CS transfer, CG ambulation 130ft with RW--decreased right foot clearance--was fitted for AFO; Min A 12 steps with 2 hand rails  - SLP: mod-severe  PS, Poor safety and insight, Moderate Memory deficits; needs assist with money management and meds,  visual deficits; (+) dysarthria; reg/thins  .  SW contacted pt's [facility] program --they are processing request to extend HHA hours. family cannot provide supervision   - TDD: DC  home once HHA hours approved--SW will follow up.

## 2021-07-19 NOTE — CHART NOTE - NSCHARTNOTEFT_GEN_A_CORE
Nutrition Follow Up Note  Hospital Course (Per Electronic Medical Record): 55M with PMHx of HTN, DM, HLD, ,depression, and prior CVA with no residual deifcits, who presented to [facility] on 06/11/2021 with dysphagia and difficulty ambulating, & dysarthria found to have an acute right corona radiata infarct. Admitted for multidisciplinary rehab program.  Source: Medical Record [X] Patient [X] Family [ ]         Diet: regular, consistent carbohydrate, DASH/TLC, no concentrated potassium   Pt tolerating diet current diet but variable PO intake. Pt provided with nutrition education on current diet as pt requests juice; explained consistent carbohydrate diet and how juice can cause elevated blood sugars. Pt was put on no concentrated potassium diet per nephrology; K WNL; however BUN and Creat elevated. Pt encouraged to consume water & unsweetened beverages.     Enteral/Parenteral Nutrition: N/A    Current Weight: 228.6 lbs (7/19) weight increase noted; no edema per nursing flow sheets  227.9 lbs (7/14)  225.7 lbs (7/13)  217.3 lbs (7/10)  223.5 lbs (7/8)  218.6 lbs (7/5)  217.1 lbs (7/4)  216.2 lbs (6/30)  217.1 lbs (6/29)  218.2 lbs (6/28)  217.5 lbs (6/27)  214.5 lbs (6/26)  215.3 lbs (6/25)    Pertinent Medications: MEDICATIONS  (STANDING):  amLODIPine   Tablet 10 milliGRAM(s) Oral <User Schedule>  apixaban 2.5 milliGRAM(s) Oral every 12 hours  atorvastatin 80 milliGRAM(s) Oral at bedtime  buDESOnide    Inhalation Suspension 0.5 milliGRAM(s) Inhalation every 12 hours  citalopram 20 milliGRAM(s) Oral daily  dextrose 40% Gel 15 Gram(s) Oral once  dextrose 5%. 1000 milliLiter(s) (50 mL/Hr) IV Continuous <Continuous>  dextrose 5%. 1000 milliLiter(s) (100 mL/Hr) IV Continuous <Continuous>  dextrose 50% Injectable 25 Gram(s) IV Push once  dextrose 50% Injectable 12.5 Gram(s) IV Push once  dextrose 50% Injectable 25 Gram(s) IV Push once  gabapentin 600 milliGRAM(s) Oral at bedtime  glucagon  Injectable 1 milliGRAM(s) IntraMuscular once  hydrALAZINE 75 milliGRAM(s) Oral every 8 hours  insulin glargine Injectable (LANTUS) 5 Unit(s) SubCutaneous at bedtime  insulin lispro (ADMELOG) corrective regimen sliding scale   SubCutaneous three times a day before meals  insulin lispro (ADMELOG) corrective regimen sliding scale   SubCutaneous at bedtime  labetalol 100 milliGRAM(s) Oral two times a day  polyethylene glycol 3350 17 Gram(s) Oral daily  senna 2 Tablet(s) Oral at bedtime    MEDICATIONS  (PRN):  acetaminophen    Suspension .. 650 milliGRAM(s) Oral every 6 hours PRN Temp greater or equal to 38C (100.4F), Mild Pain (1 - 3)  ALBUTerol    90 MICROgram(s) HFA Inhaler 2 Puff(s) Inhalation every 6 hours PRN Shortness of Breath and/or Wheezing  bisacodyl 5 milliGRAM(s) Oral every 12 hours PRN Constipation  guaiFENesin Oral Liquid (Sugar-Free) 100 milliGRAM(s) Oral every 6 hours PRN Cough      Pertinent Labs:  07-19 Na138 mmol/L Glu 99 mg/dL K+ 4.5 mmol/L Cr  2.72 mg/dL<H> BUN 46 mg/dL<H> 07-19 Alb 2.7 g/dL<L>        Skin: Skin intact per nursing flow sheets     Edema: No edema per nursing flow sheets     Last BM: on 7/17 per nursing flow sheets    Estimated Needs:   [X] No Change since Previous Assessment  [ ] Recalculated:     Previous Nutrition Diagnosis:   Moderate malnutrition    Nutrition Diagnosis is [X] Ongoing    [ ] Resolved   [ ] Not Applicable      New Nutrition Diagnosis: [X] Not Applicable  [ ] Inadequate Protein Energy Intake   [ ] Inadequate Oral Intake   [ ] Excessive Energy Intake   [ ] Increased Nutrient Needs   [ ] Obesity   [ ] Altered GI Function   [ ] Unintended Weight Loss   [ ] Food & Nutrition Related Knowledge Deficit  [ ] Limited Adherence to nutrition related recommendations   [ ] Malnutrition      Interventions:   1. Recommend continuing with current diet; consider liberalizing potassium restriction if pt has suboptimal PO intake  2. Continue to review nutrition education     Monitoring & Evaluation:   [X] Weights   [X] PO Intake   [X] Follow Up (Per Protocol)  [X] Tolerance to Diet Prescription   [X] Other: Labs & POCT glucose    RD Remains Available.  Aleyda Lora, RD

## 2021-07-19 NOTE — PROGRESS NOTE ADULT - SUBJECTIVE AND OBJECTIVE BOX
No distress    Vital Signs Last 24 Hrs  T(C): 36.7 (07-18-21 @ 21:36), Max: 36.7 (07-18-21 @ 21:36)  T(F): 98 (07-18-21 @ 21:36), Max: 98 (07-18-21 @ 21:36)  HR: 68 (07-19-21 @ 06:10) (61 - 74)  BP: 160/88 (07-19-21 @ 06:10) (156/91 - 160/88)  RR: 16 (07-18-21 @ 21:36) (16 - 16)  SpO2: 97% (07-18-21 @ 21:36) (97% - 97%)    s1s2  b/l air entry  soft, ND  LE edema R > L                                                                     10.2   4.98  )-----------( 229      ( 19 Jul 2021 06:13 )             31.5     19 Jul 2021 06:13    138    |  106    |  46     ----------------------------<  99     4.5     |  23     |  2.72     Ca    8.3        19 Jul 2021 06:13    TPro  6.8    /  Alb  2.7    /  TBili  0.5    /  DBili  0.1    /  AST  22     /  ALT  32     /  AlkPhos  165    19 Jul 2021 06:13    LIVER FUNCTIONS - ( 19 Jul 2021 06:13 )  Alb: 2.7 g/dL / Pro: 6.8 g/dL / ALK PHOS: 165 U/L / ALT: 32 U/L / AST: 22 U/L / GGT: x           acetaminophen    Suspension .. 650 milliGRAM(s) Oral every 6 hours PRN  ALBUTerol    90 MICROgram(s) HFA Inhaler 2 Puff(s) Inhalation every 6 hours PRN  amLODIPine   Tablet 10 milliGRAM(s) Oral <User Schedule>  apixaban 2.5 milliGRAM(s) Oral every 12 hours  atorvastatin 80 milliGRAM(s) Oral at bedtime  bisacodyl 5 milliGRAM(s) Oral every 12 hours PRN  buDESOnide    Inhalation Suspension 0.5 milliGRAM(s) Inhalation every 12 hours  citalopram 20 milliGRAM(s) Oral daily  dextrose 40% Gel 15 Gram(s) Oral once  dextrose 5%. 1000 milliLiter(s) IV Continuous <Continuous>  dextrose 5%. 1000 milliLiter(s) IV Continuous <Continuous>  dextrose 50% Injectable 25 Gram(s) IV Push once  dextrose 50% Injectable 12.5 Gram(s) IV Push once  dextrose 50% Injectable 25 Gram(s) IV Push once  gabapentin 600 milliGRAM(s) Oral at bedtime  glucagon  Injectable 1 milliGRAM(s) IntraMuscular once  guaiFENesin Oral Liquid (Sugar-Free) 100 milliGRAM(s) Oral every 6 hours PRN  hydrALAZINE 75 milliGRAM(s) Oral every 8 hours  insulin glargine Injectable (LANTUS) 5 Unit(s) SubCutaneous at bedtime  insulin lispro (ADMELOG) corrective regimen sliding scale   SubCutaneous three times a day before meals  insulin lispro (ADMELOG) corrective regimen sliding scale   SubCutaneous at bedtime  labetalol 100 milliGRAM(s) Oral two times a day  polyethylene glycol 3350 17 Gram(s) Oral daily  senna 2 Tablet(s) Oral at bedtime    A/P:    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

## 2021-07-19 NOTE — PROGRESS NOTE ADULT - SUBJECTIVE AND OBJECTIVE BOX
HPI:  MARK ANTHONY QUINTANA is a 55M with PMHx of HTN, DM, HLD, ,depression, and prior CVA with no residual deficits, who presented to [facility] on 06/11/2021 with several days of dysphagia and difficulty ambulating. Patient had been experiencing symptoms since 6/8 after coming home from a dental appointment. He was brought to the hospital after a wellness check from his insurance company. In the ED, his Utox tested positive for cocaine, though patient denied use, and CT head scan showed findings c/w an acute right corona radiata infarct, along with multiple b/l subacute infarcts. Patient was managed medically, as he was not a candidate for either tPA or thrombectomy.    Patient was evaluated by PM&R and therapy for functional deficits and gait/ ADL impairments and recommended acute rehabilitation. Patient was medically optimized for discharge to [facility] on 06/21/2021. Patient was seen and examined at the bedside upon arrival.  (21 Jun 2021 15:04)      PAST MEDICAL & SURGICAL HISTORY:  Cerebrovascular accident (CVA)    Hypertension    Hyperlipidemia    Diabetes mellitus    Depression        Subjective:  Pt. wants to know when he is being discharged.  Frustrated with being in hospital.  cough/congestion is much better,  No fever.  BP elevated in 150s-160s      VITALS  Vital Signs Last 24 Hrs  T(C): 36.7 (18 Jul 2021 21:36), Max: 36.7 (18 Jul 2021 21:36)  T(F): 98 (18 Jul 2021 21:36), Max: 98 (18 Jul 2021 21:36)  HR: 68 (19 Jul 2021 06:10) (61 - 74)  BP: 160/88 (19 Jul 2021 06:10) (149/87 - 160/88)  BP(mean): --  RR: 16 (18 Jul 2021 21:36) (16 - 16)  SpO2: 97% (18 Jul 2021 21:36) (97% - 97%)    REVIEW OF SYMPTOMS  Positive: cough, congestion-- improved.  Weakness, cognitive deficits  Denies: headache, lightheadedness, CP, SOB, abdominal pain, dysuria, nausea, constipation      ----------------------------------------------------------------------  PHYSICAL EXAM:    Constitutional - NAD, Comfortable  HEENT - NCAT  Chest -breathing comfortably on RA,   Cardio - RRR  Abdomen -  Soft, NTND,   Extremities - right leg edema, No calf tenderness   Neurologic Exam:                    Cognitive -             Orientation: alert & awake, Ox 3     Speech - (+) mild dysarthria,  No aphasia     Cranial Nerves - Right pupil sluggish, left pupil reactive, Visual fields slightly impaired on right, flattening of right NLF, EOMI, Shoulder shrug intact, +dysarthria,      Motor -                      LEFT    UE - ShAB 5/5, EF 5/5, EE 5/5, WE 5/5,  WNL                    RIGHT UE - ShAB 5/5, EF 5/5, EE 4/5, WE 5/5,  WNL                    LEFT    LE - HF 5/5, KE 5/5, DF 5/5, PF 5/5                    RIGHT LE - HF 4/5, KF 4/5, KE 5/5, DF 3/5, PF 4/5      Sensory - diminished sensation in dorsum and plantar aspect of right foot     Coordination - FTN intact & HTS intact  Psychiatric - mood stable, appropriate      RECENT LABS                        10.2   4.98  )-----------( 229      ( 19 Jul 2021 06:13 )             31.5     07-19    138  |  106  |  46<H>  ----------------------------<  99  4.5   |  23  |  2.72<H>    Ca    8.3<L>      19 Jul 2021 06:13    TPro  6.8  /  Alb  2.7<L>  /  TBili  0.5  /  DBili  0.1  /  AST  22  /  ALT  32  /  AlkPhos  165<H>  07-19            RADIOLOGY/OTHER RESULTS      MEDICATIONS  (STANDING):  amLODIPine   Tablet 10 milliGRAM(s) Oral <User Schedule>  apixaban 2.5 milliGRAM(s) Oral every 12 hours  atorvastatin 80 milliGRAM(s) Oral at bedtime  buDESOnide    Inhalation Suspension 0.5 milliGRAM(s) Inhalation every 12 hours  citalopram 20 milliGRAM(s) Oral daily  dextrose 40% Gel 15 Gram(s) Oral once  dextrose 5%. 1000 milliLiter(s) (50 mL/Hr) IV Continuous <Continuous>  dextrose 5%. 1000 milliLiter(s) (100 mL/Hr) IV Continuous <Continuous>  dextrose 50% Injectable 25 Gram(s) IV Push once  dextrose 50% Injectable 12.5 Gram(s) IV Push once  dextrose 50% Injectable 25 Gram(s) IV Push once  gabapentin 600 milliGRAM(s) Oral at bedtime  glucagon  Injectable 1 milliGRAM(s) IntraMuscular once  hydrALAZINE 75 milliGRAM(s) Oral every 8 hours  insulin glargine Injectable (LANTUS) 5 Unit(s) SubCutaneous at bedtime  insulin lispro (ADMELOG) corrective regimen sliding scale   SubCutaneous three times a day before meals  insulin lispro (ADMELOG) corrective regimen sliding scale   SubCutaneous at bedtime  labetalol 100 milliGRAM(s) Oral two times a day  polyethylene glycol 3350 17 Gram(s) Oral daily  senna 2 Tablet(s) Oral at bedtime    MEDICATIONS  (PRN):  acetaminophen    Suspension .. 650 milliGRAM(s) Oral every 6 hours PRN Temp greater or equal to 38C (100.4F), Mild Pain (1 - 3)  ALBUTerol    90 MICROgram(s) HFA Inhaler 2 Puff(s) Inhalation every 6 hours PRN Shortness of Breath and/or Wheezing  bisacodyl 5 milliGRAM(s) Oral every 12 hours PRN Constipation  guaiFENesin Oral Liquid (Sugar-Free) 100 milliGRAM(s) Oral every 6 hours PRN Cough

## 2021-07-20 LAB
ANION GAP SERPL CALC-SCNC: 7 MMOL/L — SIGNIFICANT CHANGE UP (ref 5–17)
BUN SERPL-MCNC: 47 MG/DL — HIGH (ref 7–23)
CALCIUM SERPL-MCNC: 8.3 MG/DL — LOW (ref 8.4–10.5)
CHLORIDE SERPL-SCNC: 107 MMOL/L — SIGNIFICANT CHANGE UP (ref 96–108)
CO2 SERPL-SCNC: 24 MMOL/L — SIGNIFICANT CHANGE UP (ref 22–31)
CREAT SERPL-MCNC: 2.51 MG/DL — HIGH (ref 0.5–1.3)
GLUCOSE BLDC GLUCOMTR-MCNC: 104 MG/DL — HIGH (ref 70–99)
GLUCOSE BLDC GLUCOMTR-MCNC: 133 MG/DL — HIGH (ref 70–99)
GLUCOSE BLDC GLUCOMTR-MCNC: 138 MG/DL — HIGH (ref 70–99)
GLUCOSE BLDC GLUCOMTR-MCNC: 149 MG/DL — HIGH (ref 70–99)
GLUCOSE SERPL-MCNC: 121 MG/DL — HIGH (ref 70–99)
POTASSIUM SERPL-MCNC: 4.4 MMOL/L — SIGNIFICANT CHANGE UP (ref 3.5–5.3)
POTASSIUM SERPL-SCNC: 4.4 MMOL/L — SIGNIFICANT CHANGE UP (ref 3.5–5.3)
SODIUM SERPL-SCNC: 138 MMOL/L — SIGNIFICANT CHANGE UP (ref 135–145)

## 2021-07-20 PROCEDURE — 99232 SBSQ HOSP IP/OBS MODERATE 35: CPT

## 2021-07-20 RX ADMIN — Medication 100 MILLIGRAM(S): at 18:57

## 2021-07-20 RX ADMIN — GABAPENTIN 600 MILLIGRAM(S): 400 CAPSULE ORAL at 21:37

## 2021-07-20 RX ADMIN — Medication 75 MILLIGRAM(S): at 05:42

## 2021-07-20 RX ADMIN — Medication 100 MILLIGRAM(S): at 05:42

## 2021-07-20 RX ADMIN — ATORVASTATIN CALCIUM 80 MILLIGRAM(S): 80 TABLET, FILM COATED ORAL at 21:34

## 2021-07-20 RX ADMIN — APIXABAN 2.5 MILLIGRAM(S): 2.5 TABLET, FILM COATED ORAL at 05:42

## 2021-07-20 RX ADMIN — APIXABAN 2.5 MILLIGRAM(S): 2.5 TABLET, FILM COATED ORAL at 18:37

## 2021-07-20 RX ADMIN — SENNA PLUS 2 TABLET(S): 8.6 TABLET ORAL at 21:34

## 2021-07-20 RX ADMIN — AMLODIPINE BESYLATE 10 MILLIGRAM(S): 2.5 TABLET ORAL at 18:37

## 2021-07-20 RX ADMIN — Medication 75 MILLIGRAM(S): at 21:34

## 2021-07-20 RX ADMIN — Medication 40 MILLIGRAM(S): at 05:42

## 2021-07-20 RX ADMIN — CITALOPRAM 20 MILLIGRAM(S): 10 TABLET, FILM COATED ORAL at 11:33

## 2021-07-20 RX ADMIN — Medication 75 MILLIGRAM(S): at 14:37

## 2021-07-20 RX ADMIN — INSULIN GLARGINE 5 UNIT(S): 100 INJECTION, SOLUTION SUBCUTANEOUS at 21:34

## 2021-07-20 NOTE — PROGRESS NOTE ADULT - SUBJECTIVE AND OBJECTIVE BOX
HPI:  MARK ANTHONY QUINTANA is a 55M with PMHx of HTN, DM, HLD, ,depression, and prior CVA with no residual deficits, who presented to [facility] on 06/11/2021 with several days of dysphagia and difficulty ambulating. Patient had been experiencing symptoms since 6/8 after coming home from a dental appointment. He was brought to the hospital after a wellness check from his insurance company. In the ED, his Utox tested positive for cocaine, though patient denied use, and CT head scan showed findings c/w an acute right corona radiata infarct, along with multiple b/l subacute infarcts. Patient was managed medically, as he was not a candidate for either tPA or thrombectomy.    Patient was evaluated by PM&R and therapy for functional deficits and gait/ ADL impairments and recommended acute rehabilitation. Patient was medically optimized for discharge to [facility] on 06/21/2021. Patient was seen and examined at the bedside upon arrival.  (21 Jun 2021 15:04)      PAST MEDICAL & SURGICAL HISTORY:  Cerebrovascular accident (CVA)    Hypertension    Hyperlipidemia    Diabetes mellitus    Depression        Subjective:  No significant overnight issues.  Awaiting his discharge.  Has assessment with home care [facility] today      VITALS  Vital Signs Last 24 Hrs  T(C): 36.4 (20 Jul 2021 14:33), Max: 36.7 (19 Jul 2021 22:45)  T(F): 97.5 (20 Jul 2021 14:33), Max: 98.1 (19 Jul 2021 22:45)  HR: 65 (20 Jul 2021 14:33) (62 - 75)  BP: 161/81 (20 Jul 2021 14:33) (159/91 - 166/83)  BP(mean): --  RR: 15 (20 Jul 2021 14:33) (15 - 16)  SpO2: 98% (20 Jul 2021 14:33) (96% - 98%)    REVIEW OF SYMPTOMS  Positive: cough, congestion-- improved.  Weakness, cognitive deficits  Denies: headache, lightheadedness, CP, SOB, abdominal pain, dysuria, nausea, constipation      ----------------------------------------------------------------------  PHYSICAL EXAM:    Constitutional - NAD, Comfortable  HEENT - NCAT  Chest -breathing comfortably on RA,   Cardio - RRR  Abdomen -  Soft, NTND,   Extremities - right leg edema, No calf tenderness   Neurologic Exam:                    Cognitive -             Orientation: alert & awake, Ox 3     Speech - (+) mild dysarthria,  No aphasia     Cranial Nerves - Right pupil sluggish, left pupil reactive, Visual fields slightly impaired on right, flattening of right NLF, EOMI, Shoulder shrug intact, +dysarthria,      Motor -                      LEFT    UE - ShAB 5/5, EF 5/5, EE 5/5, WE 5/5,  WNL                    RIGHT UE - ShAB 5/5, EF 5/5, EE 4/5, WE 5/5,  WNL                    LEFT    LE - HF 5/5, KE 5/5, DF 5/5, PF 5/5                    RIGHT LE - HF 4/5, KF 4/5, KE 5/5, DF 3/5, PF 4/5      Sensory - diminished sensation in dorsum and plantar aspect of right foot     Coordination - FTN intact & HTS intact  Psychiatric - mood stable, appropriate    RECENT LABS                        10.2   4.98  )-----------( 229      ( 19 Jul 2021 06:13 )             31.5     07-20    138  |  107  |  47<H>  ----------------------------<  121<H>  4.4   |  24  |  2.51<H>    Ca    8.3<L>      20 Jul 2021 06:15    TPro  6.8  /  Alb  2.7<L>  /  TBili  0.5  /  DBili  0.1  /  AST  22  /  ALT  32  /  AlkPhos  165<H>  07-19            RADIOLOGY/OTHER RESULTS      MEDICATIONS  (STANDING):  amLODIPine   Tablet 10 milliGRAM(s) Oral <User Schedule>  apixaban 2.5 milliGRAM(s) Oral every 12 hours  atorvastatin 80 milliGRAM(s) Oral at bedtime  buDESOnide    Inhalation Suspension 0.5 milliGRAM(s) Inhalation every 12 hours  citalopram 20 milliGRAM(s) Oral daily  dextrose 40% Gel 15 Gram(s) Oral once  dextrose 5%. 1000 milliLiter(s) (50 mL/Hr) IV Continuous <Continuous>  dextrose 5%. 1000 milliLiter(s) (100 mL/Hr) IV Continuous <Continuous>  dextrose 50% Injectable 25 Gram(s) IV Push once  dextrose 50% Injectable 12.5 Gram(s) IV Push once  dextrose 50% Injectable 25 Gram(s) IV Push once  furosemide    Tablet 40 milliGRAM(s) Oral daily  gabapentin 600 milliGRAM(s) Oral at bedtime  glucagon  Injectable 1 milliGRAM(s) IntraMuscular once  hydrALAZINE 75 milliGRAM(s) Oral every 8 hours  insulin glargine Injectable (LANTUS) 5 Unit(s) SubCutaneous at bedtime  insulin lispro (ADMELOG) corrective regimen sliding scale   SubCutaneous three times a day before meals  insulin lispro (ADMELOG) corrective regimen sliding scale   SubCutaneous at bedtime  labetalol 100 milliGRAM(s) Oral two times a day  polyethylene glycol 3350 17 Gram(s) Oral daily  senna 2 Tablet(s) Oral at bedtime    MEDICATIONS  (PRN):  acetaminophen    Suspension .. 650 milliGRAM(s) Oral every 6 hours PRN Temp greater or equal to 38C (100.4F), Mild Pain (1 - 3)  ALBUTerol    90 MICROgram(s) HFA Inhaler 2 Puff(s) Inhalation every 6 hours PRN Shortness of Breath and/or Wheezing  bisacodyl 5 milliGRAM(s) Oral every 12 hours PRN Constipation  guaiFENesin Oral Liquid (Sugar-Free) 100 milliGRAM(s) Oral every 6 hours PRN Cough

## 2021-07-20 NOTE — PROGRESS NOTE ADULT - ASSESSMENT
· Assessment	  MARK ANTHONY QUINTANA is a 55M with PMHx of HTN, DM, HLD, ,depression, and prior CVA with no residual deifcits, who presented to [facility] on 06/11/2021 with dysphagia and difficulty ambulating, & dysarthria found to have an acute right corona radiata infarct. Admitted for multidisciplinary rehab program.      #Comprehensive Multidisciplinary Rehab Program:  - Gait, ADL, Functional impairments  - continue PT/OT/ SLP 3 hours a day 5 days a week  - recreation therapy     #Acute corona radiata infarct and multiple b/l subacute infarcts  - distribution of b/l subacute infarcts suggestive of cardioembolic origin  - not a candidate for tPA or thrombectomy  - c/w Eliquis 2.5mg q12h and atorvastatin 80mg qhs    #cough --Viral URI--Improved  - RVP + for parainfluenza 3  --duoneb PRN  --Pulmicort  - guaiFENesin prn for the cough.    - VSS.     #RLE Edema  -venous doppler Neg    #HLD  - c/w atorvastatin    #HTN  -- BP elevated -  target <140/90  --Renal note reviewed  --Trialing lasix 40mg daily   --cont. Labetalol 100mg q12h on 7/5  - cont. amlodipine 10mg in evening   - Hydralazine increased to 75 mg TID by nephrolology on 7/7  --hospitalist following    #Acute on chronic CKD  -  Cr stable - - probably CKD3  --nephrology following-- note appreciated- Avoid nephrotoxins, trial of lasix  SONO w/o hydro  Renal diet  -    #DM  - FS and ISS  -Lantus 8 units  -- Adjustment as per hospitalist  - DC home on Sitagliptin 25mg daily (not metformin due to JOSE).      #Neuropathy  - c/w gabapentin 600mg qhs    #Tobacco use  - Nicotine 21mg/24hr    #Mood/Depression  - c/w Citalopram 20mg daily    #Sleep:  - Maintain quiet hours and low stim environment;  --nursing order to turn off TV at 9PM to encourage proper sleep hygiene  - cont.  trazodone    #Pain:  - Tylenol PRN  - avoid sedating meds that may affect cognitive recovery    #GI/Bowel:  - Senna 2 tabs qhs and Miralax PRN  - PO Dulcolax PRN    #/Bladder:  - continent    #Skin/ Pressure Injury Prevention:  - assessment on admission   - Turn Q2hrs in bed while awake, OOB to Chair, PT/OT/SLP     #DVT prophylaxis:  - Eliquis  - SCDs  - neg for b/l LE DVTs at [facility]    Labs:  CBC, BMP 7/19  Hepatic panel 7/19      #Dispo: IDR 07/15/2021  - OT: supervision eating/grooming/UBD; CG toilet transfer/shower transfer; CG bathing; min A LBD, Mod A toileting  - PT: CS transfer, CG ambulation 130ft with RW--decreased right foot clearance--was fitted for AFO; Min A 12 steps with 2 hand rails  - SLP: mod-severe  PS, Poor safety and insight, Moderate Memory deficits; needs assist with money management and meds,  visual deficits; (+) dysarthria; reg/thins  .  SW contacted pt's [facility] program --they are processing request to extend HHA hours. family cannot provide supervision   - TDD: DC  home once HHA hours approved--SW will follow up.

## 2021-07-20 NOTE — PROGRESS NOTE ADULT - SUBJECTIVE AND OBJECTIVE BOX
No distress    Vital Signs Last 24 Hrs  T(C): 36.4 (07-20-21 @ 14:33), Max: 36.7 (07-19-21 @ 22:45)  T(F): 97.5 (07-20-21 @ 14:33), Max: 98.1 (07-19-21 @ 22:45)  HR: 65 (07-20-21 @ 14:33) (62 - 75)  BP: 161/81 (07-20-21 @ 14:33) (159/91 - 166/83)  RR: 15 (07-20-21 @ 14:33) (15 - 16)  SpO2: 98% (07-20-21 @ 14:33) (96% - 98%)    s1s2  b/l air entry  soft, ND  LE edema R > L                                                                              10.2   4.98  )-----------( 229      ( 19 Jul 2021 06:13 )             31.5     20 Jul 2021 06:15    138    |  107    |  47     ----------------------------<  121    4.4     |  24     |  2.51     Ca    8.3        20 Jul 2021 06:15    TPro  6.8    /  Alb  2.7    /  TBili  0.5    /  DBili  0.1    /  AST  22     /  ALT  32     /  AlkPhos  165    19 Jul 2021 06:13    LIVER FUNCTIONS - ( 19 Jul 2021 06:13 )  Alb: 2.7 g/dL / Pro: 6.8 g/dL / ALK PHOS: 165 U/L / ALT: 32 U/L / AST: 22 U/L / GGT: x           acetaminophen    Suspension .. 650 milliGRAM(s) Oral every 6 hours PRN  ALBUTerol    90 MICROgram(s) HFA Inhaler 2 Puff(s) Inhalation every 6 hours PRN  amLODIPine   Tablet 10 milliGRAM(s) Oral <User Schedule>  apixaban 2.5 milliGRAM(s) Oral every 12 hours  atorvastatin 80 milliGRAM(s) Oral at bedtime  bisacodyl 5 milliGRAM(s) Oral every 12 hours PRN  buDESOnide    Inhalation Suspension 0.5 milliGRAM(s) Inhalation every 12 hours  citalopram 20 milliGRAM(s) Oral daily  dextrose 40% Gel 15 Gram(s) Oral once  dextrose 5%. 1000 milliLiter(s) IV Continuous <Continuous>  dextrose 5%. 1000 milliLiter(s) IV Continuous <Continuous>  dextrose 50% Injectable 25 Gram(s) IV Push once  dextrose 50% Injectable 12.5 Gram(s) IV Push once  dextrose 50% Injectable 25 Gram(s) IV Push once  furosemide    Tablet 40 milliGRAM(s) Oral daily  gabapentin 600 milliGRAM(s) Oral at bedtime  glucagon  Injectable 1 milliGRAM(s) IntraMuscular once  guaiFENesin Oral Liquid (Sugar-Free) 100 milliGRAM(s) Oral every 6 hours PRN  hydrALAZINE 75 milliGRAM(s) Oral every 8 hours  insulin glargine Injectable (LANTUS) 5 Unit(s) SubCutaneous at bedtime  insulin lispro (ADMELOG) corrective regimen sliding scale   SubCutaneous three times a day before meals  insulin lispro (ADMELOG) corrective regimen sliding scale   SubCutaneous at bedtime  labetalol 100 milliGRAM(s) Oral two times a day  polyethylene glycol 3350 17 Gram(s) Oral daily  senna 2 Tablet(s) Oral at bedtime    A/P:    DM/ischemic proteinuric CKD 3, stable  Avoid nephrotoxins  SONO w/o hydro  F/u BMP  No NSIAD's  Edema  Continue Lasix  Renal f/u as op    [phone]

## 2021-07-21 LAB
ANION GAP SERPL CALC-SCNC: 8 MMOL/L — SIGNIFICANT CHANGE UP (ref 5–17)
BUN SERPL-MCNC: 45 MG/DL — HIGH (ref 7–23)
CALCIUM SERPL-MCNC: 8.3 MG/DL — LOW (ref 8.4–10.5)
CHLORIDE SERPL-SCNC: 107 MMOL/L — SIGNIFICANT CHANGE UP (ref 96–108)
CO2 SERPL-SCNC: 23 MMOL/L — SIGNIFICANT CHANGE UP (ref 22–31)
CREAT SERPL-MCNC: 2.59 MG/DL — HIGH (ref 0.5–1.3)
GLUCOSE BLDC GLUCOMTR-MCNC: 129 MG/DL — HIGH (ref 70–99)
GLUCOSE BLDC GLUCOMTR-MCNC: 137 MG/DL — HIGH (ref 70–99)
GLUCOSE BLDC GLUCOMTR-MCNC: 150 MG/DL — HIGH (ref 70–99)
GLUCOSE BLDC GLUCOMTR-MCNC: 163 MG/DL — HIGH (ref 70–99)
GLUCOSE SERPL-MCNC: 158 MG/DL — HIGH (ref 70–99)
POTASSIUM SERPL-MCNC: 4.3 MMOL/L — SIGNIFICANT CHANGE UP (ref 3.5–5.3)
POTASSIUM SERPL-SCNC: 4.3 MMOL/L — SIGNIFICANT CHANGE UP (ref 3.5–5.3)
SODIUM SERPL-SCNC: 138 MMOL/L — SIGNIFICANT CHANGE UP (ref 135–145)

## 2021-07-21 PROCEDURE — 99232 SBSQ HOSP IP/OBS MODERATE 35: CPT

## 2021-07-21 RX ORDER — LABETALOL HCL 100 MG
200 TABLET ORAL
Refills: 0 | Status: DISCONTINUED | OUTPATIENT
Start: 2021-07-21 | End: 2021-07-23

## 2021-07-21 RX ADMIN — AMLODIPINE BESYLATE 10 MILLIGRAM(S): 2.5 TABLET ORAL at 17:03

## 2021-07-21 RX ADMIN — Medication 75 MILLIGRAM(S): at 15:09

## 2021-07-21 RX ADMIN — APIXABAN 2.5 MILLIGRAM(S): 2.5 TABLET, FILM COATED ORAL at 05:13

## 2021-07-21 RX ADMIN — ATORVASTATIN CALCIUM 80 MILLIGRAM(S): 80 TABLET, FILM COATED ORAL at 21:14

## 2021-07-21 RX ADMIN — Medication 75 MILLIGRAM(S): at 05:12

## 2021-07-21 RX ADMIN — Medication 200 MILLIGRAM(S): at 17:03

## 2021-07-21 RX ADMIN — SENNA PLUS 2 TABLET(S): 8.6 TABLET ORAL at 21:14

## 2021-07-21 RX ADMIN — GABAPENTIN 600 MILLIGRAM(S): 400 CAPSULE ORAL at 21:14

## 2021-07-21 RX ADMIN — APIXABAN 2.5 MILLIGRAM(S): 2.5 TABLET, FILM COATED ORAL at 17:03

## 2021-07-21 RX ADMIN — CITALOPRAM 20 MILLIGRAM(S): 10 TABLET, FILM COATED ORAL at 12:22

## 2021-07-21 RX ADMIN — Medication 75 MILLIGRAM(S): at 21:14

## 2021-07-21 RX ADMIN — Medication 100 MILLIGRAM(S): at 05:13

## 2021-07-21 RX ADMIN — Medication 0.5 MILLIGRAM(S): at 09:19

## 2021-07-21 RX ADMIN — Medication 40 MILLIGRAM(S): at 05:13

## 2021-07-21 RX ADMIN — Medication 0.5 MILLIGRAM(S): at 22:42

## 2021-07-21 RX ADMIN — INSULIN GLARGINE 5 UNIT(S): 100 INJECTION, SOLUTION SUBCUTANEOUS at 21:13

## 2021-07-21 NOTE — PROGRESS NOTE ADULT - ATTENDING COMMENTS
Pt. seen with fellow and student.  Agree with documentation above as per fellow with amendments made as appropriate. Patient medically stable. Making progress towards rehab goals.     Right corona radiata infarct--   BP and cr better today  Renal consult reviewed and appreciated-- f/u on PVR  discharge planning to [facility]
Pt. seen with fellow.  Agree with documentation above as per fellow with amendments made as appropriate. Patient medically stable. Making progress towards rehab goals.     right corona radiata infarct--   Stable.  SW awaiting notice from [facility] regarding extending HHA hours.
Pt. seen with fellow.  Agree with documentation above as per fellow with amendments made as appropriate. Patient medically stable. Making progress towards rehab goals.     right corona radiata infarct  Cough, congestion  --RVP + parainfluenza.    --CXR read pending--Lungs CTA  --right LE swelling-- LE doppler neg.
Pt is stable to continue rehab
Pt. seen with resident.  Agree with documentation above as per resident with amendments made as appropriate. Patient medically stable. Making progress towards rehab goals.     Right corona radiata infarct  HTN-- BP elevated-- d/w hospitalist-- increased hydralazine to 25mg BID,  Cr improved-- stopped IVFs  MBS today-- passed--upgraded to Regular with thin liquids.
Pt. seen with resident.  Agree with documentation above as per resident with amendments made as appropriate. Patient medically stable. Making progress towards rehab goals.     Right corona radiata infarct  No new issues.  Cr still elevated -- reordered IVFs
Pt. seen with resident.  Agree with documentation above as per resident with amendments made as appropriate. Patient medically stable. Making progress towards rehab goals.     Right corona radiata infarct  Sent januvia and eliquis to pharmacy.    stable.  awaiting discharge
Pt. seen with resident.  Agree with documentation above as per resident with amendments made as appropriate. Patient medically stable. Making progress towards rehab goals.     Right corona radiata infarct--   stable,  d/c planning
DISCHARGE

## 2021-07-21 NOTE — PROGRESS NOTE ADULT - SUBJECTIVE AND OBJECTIVE BOX
No distress    Vital Signs Last 24 Hrs  T(C): 36.8 (07-21-21 @ 07:42), Max: 36.8 (07-21-21 @ 07:42)  T(F): 98.2 (07-21-21 @ 07:42), Max: 98.2 (07-21-21 @ 07:42)  HR: 62 (07-21-21 @ 17:02) (60 - 80)  BP: 159/93 (07-21-21 @ 17:02) (133/82 - 165/85)  RR: 15 (07-21-21 @ 07:42) (14 - 15)  SpO2: 95% (07-21-21 @ 09:30) (95% - 96%)    s1s2  b/l air entry  soft, ND  LE edema R > L                                                                  21 Jul 2021 07:00    138    |  107    |  45     ----------------------------<  158    4.3     |  23     |  2.59     Ca    8.3        21 Jul 2021 07:00    acetaminophen    Suspension .. 650 milliGRAM(s) Oral every 6 hours PRN  ALBUTerol    90 MICROgram(s) HFA Inhaler 2 Puff(s) Inhalation every 6 hours PRN  amLODIPine   Tablet 10 milliGRAM(s) Oral <User Schedule>  apixaban 2.5 milliGRAM(s) Oral every 12 hours  atorvastatin 80 milliGRAM(s) Oral at bedtime  bisacodyl 5 milliGRAM(s) Oral every 12 hours PRN  buDESOnide    Inhalation Suspension 0.5 milliGRAM(s) Inhalation every 12 hours  citalopram 20 milliGRAM(s) Oral daily  dextrose 40% Gel 15 Gram(s) Oral once  dextrose 5%. 1000 milliLiter(s) IV Continuous <Continuous>  dextrose 5%. 1000 milliLiter(s) IV Continuous <Continuous>  dextrose 50% Injectable 25 Gram(s) IV Push once  dextrose 50% Injectable 12.5 Gram(s) IV Push once  dextrose 50% Injectable 25 Gram(s) IV Push once  furosemide    Tablet 40 milliGRAM(s) Oral daily  gabapentin 600 milliGRAM(s) Oral at bedtime  glucagon  Injectable 1 milliGRAM(s) IntraMuscular once  guaiFENesin Oral Liquid (Sugar-Free) 100 milliGRAM(s) Oral every 6 hours PRN  hydrALAZINE 75 milliGRAM(s) Oral every 8 hours  insulin glargine Injectable (LANTUS) 5 Unit(s) SubCutaneous at bedtime  insulin lispro (ADMELOG) corrective regimen sliding scale   SubCutaneous three times a day before meals  insulin lispro (ADMELOG) corrective regimen sliding scale   SubCutaneous at bedtime  labetalol 200 milliGRAM(s) Oral two times a day  polyethylene glycol 3350 17 Gram(s) Oral daily  senna 2 Tablet(s) Oral at bedtime    A/P:    DM/ischemic proteinuric CKD 3, stable  Avoid nephrotoxins  SONO w/o hydro  F/u BMP  No NSIAD's  Edema  Continue Lasix  Renal f/u as op    [phone]

## 2021-07-21 NOTE — PROGRESS NOTE ADULT - SUBJECTIVE AND OBJECTIVE BOX
Patient is a 55y old  Male who presents with a chief complaint of Acute right corona radiata CVA (2021 10:15)    HPI:  MARK ANTHONY QUINTANA is a 55M with PMHx of HTN, DM, HLD, ,depression, and prior CVA with no residual deficits, who presented to [facility] on 2021 with several days of dysphagia and difficulty ambulating. Patient had been experiencing symptoms since  after coming home from a dental appointment. He was brought to the hospital after a wellness check from his insurance company. In the ED, his Utox tested positive for cocaine, though patient denied use, and CT head scan showed findings c/w an acute right corona radiata infarct, along with multiple b/l subacute infarcts. Patient was managed medically, as he was not a candidate for either tPA or thrombectomy.    Patient was evaluated by PM&R and therapy for functional deficits and gait/ ADL impairments and recommended acute rehabilitation. Patient was medically optimized for discharge to [facility] on 2021. Patient was seen and examined at the bedside upon arrival.  (2021 15:04)    PAST MEDICAL & SURGICAL HISTORY:  Cerebrovascular accident (CVA)    Hypertension    Hyperlipidemia    Diabetes mellitus    Depression      Allergies    No Known Allergies    Intolerances      ----------------------------------------------------------------------    SUBJECTIVE: Patient seen this morning. No events overnight. He is anxious to go home.      ROS:  Positive: cough improved  Denies: headache, lightheadedness, CP, SOB, abdominal pain, dysuria, nausea, constipation      ----------------------------------------------------------------------  PHYSICAL EXAM:    Vital Signs Last 24 Hrs  T(C): 36.8 (2021 07:42), Max: 36.8 (2021 07:42)  T(F): 98.2 (2021 07:42), Max: 98.2 (2021 07:42)  HR: 61 (2021 09:30) (61 - 80)  BP: 133/82 (2021 07:42) (133/82 - 165/85)  BP(mean): --  RR: 15 (2021 07:42) (14 - 15)  SpO2: 95% (2021 09:30) (95% - 98%)  Daily     Daily Weight in k.5 (2021 22:31)      Constitutional - NAD, Comfortable  HEENT - NCAT  Chest -breathing comfortably on RA,   Cardio - RRR  Abdomen -  Soft, NTND,   Extremities - right leg edema, No calf tenderness   Neurologic Exam:                    Cognitive -             Orientation: alert & awake, Ox 3     Speech - (+) mild dysarthria,  No aphasia     Cranial Nerves - Right pupil sluggish, left pupil reactive, Visual fields slightly impaired on right, flattening of right NLF, EOMI, Shoulder shrug intact, +dysarthria,      Motor -                      LEFT    UE - ShAB 5/5, EF 5/5, EE 5/5, WE 5/5,  WNL                    RIGHT UE - ShAB 5/5, EF 5/5, EE 4/5, WE 5/5,  WNL                    LEFT    LE - HF 5/5, KE 5/5, DF 5/5, PF 5/5                    RIGHT LE - HF 4/5, KF 4/5, KE 5/5, DF 3/5, PF 4/5      Sensory - diminished sensation in dorsum and plantar aspect of right foot     Coordination - FTN intact & HTS intact  Psychiatric - mood stable, appropriate      ----------------------------------------------------------------------  RECENT LABS:          138  |  107  |  45<H>  ----------------------------<  158<H>  4.3   |  23  |  2.59<H>    Ca    8.3<L>      2021 07:00            CAPILLARY BLOOD GLUCOSE      POCT Blood Glucose.: 150 mg/dL (2021 07:41)  POCT Blood Glucose.: 149 mg/dL (2021 21:33)  POCT Blood Glucose.: 138 mg/dL (2021 17:04)    ----------------------------------------------------------------------  RECENT IMAGING:    US Duplex Venous Lower Ext Ltd, Right (21)  No evidence of right lower extremity deep venous thrombosis.        ----------------------------------------------------------------------  MEDICATIONS:  MEDICATIONS  (STANDING):  amLODIPine   Tablet 10 milliGRAM(s) Oral <User Schedule>  apixaban 2.5 milliGRAM(s) Oral every 12 hours  atorvastatin 80 milliGRAM(s) Oral at bedtime  buDESOnide    Inhalation Suspension 0.5 milliGRAM(s) Inhalation every 12 hours  citalopram 20 milliGRAM(s) Oral daily  dextrose 40% Gel 15 Gram(s) Oral once  dextrose 5%. 1000 milliLiter(s) (50 mL/Hr) IV Continuous <Continuous>  dextrose 5%. 1000 milliLiter(s) (100 mL/Hr) IV Continuous <Continuous>  dextrose 50% Injectable 25 Gram(s) IV Push once  dextrose 50% Injectable 12.5 Gram(s) IV Push once  dextrose 50% Injectable 25 Gram(s) IV Push once  furosemide    Tablet 40 milliGRAM(s) Oral daily  gabapentin 600 milliGRAM(s) Oral at bedtime  glucagon  Injectable 1 milliGRAM(s) IntraMuscular once  hydrALAZINE 75 milliGRAM(s) Oral every 8 hours  insulin glargine Injectable (LANTUS) 5 Unit(s) SubCutaneous at bedtime  insulin lispro (ADMELOG) corrective regimen sliding scale   SubCutaneous three times a day before meals  insulin lispro (ADMELOG) corrective regimen sliding scale   SubCutaneous at bedtime  labetalol 200 milliGRAM(s) Oral two times a day  polyethylene glycol 3350 17 Gram(s) Oral daily  senna 2 Tablet(s) Oral at bedtime    MEDICATIONS  (PRN):  acetaminophen    Suspension .. 650 milliGRAM(s) Oral every 6 hours PRN Temp greater or equal to 38C (100.4F), Mild Pain (1 - 3)  ALBUTerol    90 MICROgram(s) HFA Inhaler 2 Puff(s) Inhalation every 6 hours PRN Shortness of Breath and/or Wheezing  bisacodyl 5 milliGRAM(s) Oral every 12 hours PRN Constipation  guaiFENesin Oral Liquid (Sugar-Free) 100 milliGRAM(s) Oral every 6 hours PRN Cough    ----------------------------------------------------------------------  Assessment and Plan:   · Assessment	  · Assessment	  MARK ANTHONY QUINTANA is a 55M with PMHx of HTN, DM, HLD, ,depression, and prior CVA with no residual deifcits, who presented to [facility] on 2021 with dysphagia and difficulty ambulating, & dysarthria found to have an acute right corona radiata infarct. Admitted for multidisciplinary rehab program.      #Comprehensive Multidisciplinary Rehab Program:  - Gait, ADL, Functional impairments  - continue PT/OT/ SLP 3 hours a day 5 days a week  - recreation therapy     #Acute corona radiata infarct and multiple b/l subacute infarcts  - distribution of b/l subacute infarcts suggestive of cardioembolic origin  - not a candidate for tPA or thrombectomy  - c/w Eliquis 2.5mg q12h and atorvastatin 80mg qhs    #cough --Viral URI--Improved  - RVP + for parainfluenza 3  --duoneb PRN  --Pulmicort  - guaiFENesin prn for the cough.    - VSS.     #RLE Edema  -venous doppler Neg    #HLD  - c/w atorvastatin    #HTN  -- Target <140/90  --Renal note reviewed  --Trialing lasix 40mg daily   --Labetalol originally 100 mg Q12 on , now increased to 200 mg Q12H on   - cont. amlodipine 10mg in evening   - Hydralazine increased to 75 mg TID by nephrolology on   --hospitalist following    #Acute on chronic CKD  -  Cr stable - - probably CKD3  --nephrology following-- note appreciated- Avoid nephrotoxins, trial of lasix started   SONO w/o hydro  Renal diet  -    #DM  - FS and ISS  -Lantus 5 units  -- Adjustment as per hospitalist  - DC home on Sitagliptin 25mg daily (not metformin due to JOSE).      #Neuropathy  - c/w gabapentin 600mg qhs      #Mood/Depression  - c/w Citalopram 20mg daily    #Sleep:  - Maintain quiet hours and low stim environment;  --nursing order to turn off TV at 9PM to encourage proper sleep hygiene  - cont.  trazodone    #Pain:  - Tylenol PRN  - avoid sedating meds that may affect cognitive recovery    #GI/Bowel:  - Senna 2 tabs qhs and Miralax PRN  - PO Dulcolax PRN    #/Bladder:  - continent    #Skin/ Pressure Injury Prevention:  - assessment on admission   - Turn Q2hrs in bed while awake, OOB to Chair, PT/OT/SLP     #DVT prophylaxis:  - Eliquis  - SCDs  - neg for b/l LE DVTs at [facility]    Labs:  CBC, BMP   Hepatic panel       #Dispo: IDR 07/15/2021  - OT: supervision eating/grooming/UBD; CG toilet transfer/shower transfer; CG bathing; min A LBD, Mod A toileting  - PT: CS transfer, CG ambulation 130ft with RW--decreased right foot clearance--was fitted for AFO; Min A 12 steps with 2 hand rails  - SLP: mod-severe  PS, Poor safety and insight, Moderate Memory deficits; needs assist with money management and meds,  visual deficits; (+) dysarthria; reg/thins  .  SW contacted pt's [facility] program --they are processing request to extend HHA hours. family cannot provide supervision   - TDD: DC  home once HHA hours approved--SW will follow up.               Patient is a 55y old  Male who presents with a chief complaint of Acute right corona radiata CVA (2021 10:15)    HPI:  MARK ANTHONY QUINTANA is a 55M with PMHx of HTN, DM, HLD, ,depression, and prior CVA with no residual deficits, who presented to [facility] on 2021 with several days of dysphagia and difficulty ambulating. Patient had been experiencing symptoms since  after coming home from a dental appointment. He was brought to the hospital after a wellness check from his insurance company. In the ED, his Utox tested positive for cocaine, though patient denied use, and CT head scan showed findings c/w an acute right corona radiata infarct, along with multiple b/l subacute infarcts. Patient was managed medically, as he was not a candidate for either tPA or thrombectomy.    Patient was evaluated by PM&R and therapy for functional deficits and gait/ ADL impairments and recommended acute rehabilitation. Patient was medically optimized for discharge to [facility] on 2021. Patient was seen and examined at the bedside upon arrival.  (2021 15:04)    PAST MEDICAL & SURGICAL HISTORY:  Cerebrovascular accident (CVA)    Hypertension    Hyperlipidemia    Diabetes mellitus    Depression      Allergies    No Known Allergies    Intolerances      ----------------------------------------------------------------------    SUBJECTIVE: Patient seen this morning. No events overnight. He is anxious to go home.      ROS:  Positive: cough improved  Denies: headache, lightheadedness, CP, SOB, abdominal pain, dysuria, nausea, constipation      ----------------------------------------------------------------------  PHYSICAL EXAM:    Vital Signs Last 24 Hrs  T(C): 36.8 (2021 07:42), Max: 36.8 (2021 07:42)  T(F): 98.2 (2021 07:42), Max: 98.2 (2021 07:42)  HR: 61 (2021 09:30) (61 - 80)  BP: 133/82 (2021 07:42) (133/82 - 165/85)  BP(mean): --  RR: 15 (2021 07:42) (14 - 15)  SpO2: 95% (2021 09:30) (95% - 98%)  Daily     Daily Weight in k.5 (2021 22:31)      Constitutional - NAD, Comfortable  HEENT - NCAT  Chest -breathing comfortably on RA,   Cardio - RRR  Abdomen -  Soft, NTND,   Extremities - right leg edema, No calf tenderness   Neurologic Exam:                    Cognitive -             Orientation: alert & awake, Ox 3     Speech - (+) mild dysarthria,  No aphasia     Cranial Nerves - Right pupil sluggish, left pupil reactive, Visual fields slightly impaired on right, flattening of right NLF, EOMI, Shoulder shrug intact, +dysarthria,      Motor -                      LEFT    UE - ShAB 5/5, EF 5/5, EE 5/5, WE 5/5,  WNL                    RIGHT UE - ShAB 5/5, EF 5/5, EE 4/5, WE 5/5,  WNL                    LEFT    LE - HF 5/5, KE 5/5, DF 5/5, PF 5/5                    RIGHT LE - HF 4/5, KF 4/5, KE 5/5, DF 3/5, PF 4/5      Sensory - diminished sensation in dorsum and plantar aspect of right foot     Coordination - FTN intact & HTS intact  Psychiatric - mood stable, appropriate      ----------------------------------------------------------------------  RECENT LABS:          138  |  107  |  45<H>  ----------------------------<  158<H>  4.3   |  23  |  2.59<H>    Ca    8.3<L>      2021 07:00            CAPILLARY BLOOD GLUCOSE      POCT Blood Glucose.: 150 mg/dL (2021 07:41)  POCT Blood Glucose.: 149 mg/dL (2021 21:33)  POCT Blood Glucose.: 138 mg/dL (2021 17:04)    ----------------------------------------------------------------------  RECENT IMAGING:    US Duplex Venous Lower Ext Ltd, Right (21)  No evidence of right lower extremity deep venous thrombosis.        ----------------------------------------------------------------------  MEDICATIONS:  MEDICATIONS  (STANDING):  amLODIPine   Tablet 10 milliGRAM(s) Oral <User Schedule>  apixaban 2.5 milliGRAM(s) Oral every 12 hours  atorvastatin 80 milliGRAM(s) Oral at bedtime  buDESOnide    Inhalation Suspension 0.5 milliGRAM(s) Inhalation every 12 hours  citalopram 20 milliGRAM(s) Oral daily  dextrose 40% Gel 15 Gram(s) Oral once  dextrose 5%. 1000 milliLiter(s) (50 mL/Hr) IV Continuous <Continuous>  dextrose 5%. 1000 milliLiter(s) (100 mL/Hr) IV Continuous <Continuous>  dextrose 50% Injectable 25 Gram(s) IV Push once  dextrose 50% Injectable 12.5 Gram(s) IV Push once  dextrose 50% Injectable 25 Gram(s) IV Push once  furosemide    Tablet 40 milliGRAM(s) Oral daily  gabapentin 600 milliGRAM(s) Oral at bedtime  glucagon  Injectable 1 milliGRAM(s) IntraMuscular once  hydrALAZINE 75 milliGRAM(s) Oral every 8 hours  insulin glargine Injectable (LANTUS) 5 Unit(s) SubCutaneous at bedtime  insulin lispro (ADMELOG) corrective regimen sliding scale   SubCutaneous three times a day before meals  insulin lispro (ADMELOG) corrective regimen sliding scale   SubCutaneous at bedtime  labetalol 200 milliGRAM(s) Oral two times a day  polyethylene glycol 3350 17 Gram(s) Oral daily  senna 2 Tablet(s) Oral at bedtime    MEDICATIONS  (PRN):  acetaminophen    Suspension .. 650 milliGRAM(s) Oral every 6 hours PRN Temp greater or equal to 38C (100.4F), Mild Pain (1 - 3)  ALBUTerol    90 MICROgram(s) HFA Inhaler 2 Puff(s) Inhalation every 6 hours PRN Shortness of Breath and/or Wheezing  bisacodyl 5 milliGRAM(s) Oral every 12 hours PRN Constipation  guaiFENesin Oral Liquid (Sugar-Free) 100 milliGRAM(s) Oral every 6 hours PRN Cough    ----------------------------------------------------------------------  Assessment and Plan:   · Assessment	  · Assessment	  MARK ANTHONY QUINTANA is a 55M with PMHx of HTN, DM, HLD, ,depression, and prior CVA with no residual deifcits, who presented to [facility] on 2021 with dysphagia and difficulty ambulating, & dysarthria found to have an acute right corona radiata infarct. Admitted for multidisciplinary rehab program.      #Comprehensive Multidisciplinary Rehab Program:  - Gait, ADL, Functional impairments  - continue PT/OT/ SLP 3 hours a day 5 days a week  - recreation therapy     #Acute corona radiata infarct and multiple b/l subacute infarcts  - distribution of b/l subacute infarcts suggestive of cardioembolic origin  - not a candidate for tPA or thrombectomy  - c/w Eliquis 2.5mg q12h and atorvastatin 80mg qhs    #cough --Viral URI--Improved  - RVP + for parainfluenza 3  --duoneb PRN  --Pulmicort  - guaiFENesin prn for the cough.    - VSS.     #RLE Edema  -venous doppler Neg    #HLD  - c/w atorvastatin    #HTN  -- Target <140/90  --Renal note reviewed  --Trialing lasix 40mg daily   --Labetalol originally 100 mg Q12 on , now increased to 200 mg Q12H on  by hospitalist  - cont. amlodipine 10mg in evening   - Hydralazine  75 mg TID  --hospitalist following    #Acute on chronic CKD  -  Cr stable - - probably CKD3  --nephrology following-- note appreciated- Avoid nephrotoxins, trial of lasix started   SONO w/o hydro  Renal diet  -    #DM  - FS and ISS  -Lantus 5 units  -- Adjustment as per hospitalist  - DC home on Sitagliptin 25mg daily (not metformin due to JOSE).      #Neuropathy  - c/w gabapentin 600mg qhs      #Mood/Depression  - c/w Citalopram 20mg daily    #Sleep:  - Maintain quiet hours and low stim environment;  --nursing order to turn off TV at 9PM to encourage proper sleep hygiene  - cont.  trazodone    #Pain:  - Tylenol PRN  - avoid sedating meds that may affect cognitive recovery    #GI/Bowel:  - Senna 2 tabs qhs and Miralax PRN  - PO Dulcolax PRN    #/Bladder:  - continent    #Skin/ Pressure Injury Prevention:  - assessment on admission   - Turn Q2hrs in bed while awake, OOB to Chair, PT/OT/SLP     #DVT prophylaxis:  - Eliquis  - SCDs  - neg for b/l LE DVTs at [facility]    Labs:  CBC, BMP   Hepatic panel       #Dispo: IDR 07/15/2021  - OT: supervision eating/grooming/UBD; CG toilet transfer/shower transfer; CG bathing; min A LBD, Mod A toileting  - PT: CS transfer, CG ambulation 130ft with RW--decreased right foot clearance--was fitted for AFO; Min A 12 steps with 2 hand rails  - SLP: mod-severe  PS, Poor safety and insight, Moderate Memory deficits; needs assist with money management and meds,  visual deficits; (+) dysarthria; reg/thins  .  SW contacted pt's [facility] program --they are processing request to extend HHA hours. family cannot provide supervision   - TDD: DC  home once HHA hours approved--SW will follow up.

## 2021-07-21 NOTE — PROGRESS NOTE ADULT - SUBJECTIVE AND OBJECTIVE BOX
Patient is a 55y old  Male who presents with a chief complaint of Acute right corona radiata CVA (20 Jul 2021 16:19)    Patient seen and examined at bedside. No overnight events reported.     ALLERGIES:  No Known Allergies    MEDICATIONS  (STANDING):  amLODIPine   Tablet 10 milliGRAM(s) Oral <User Schedule>  apixaban 2.5 milliGRAM(s) Oral every 12 hours  atorvastatin 80 milliGRAM(s) Oral at bedtime  buDESOnide    Inhalation Suspension 0.5 milliGRAM(s) Inhalation every 12 hours  citalopram 20 milliGRAM(s) Oral daily  dextrose 40% Gel 15 Gram(s) Oral once  dextrose 5%. 1000 milliLiter(s) (50 mL/Hr) IV Continuous <Continuous>  dextrose 5%. 1000 milliLiter(s) (100 mL/Hr) IV Continuous <Continuous>  dextrose 50% Injectable 25 Gram(s) IV Push once  dextrose 50% Injectable 12.5 Gram(s) IV Push once  dextrose 50% Injectable 25 Gram(s) IV Push once  furosemide    Tablet 40 milliGRAM(s) Oral daily  gabapentin 600 milliGRAM(s) Oral at bedtime  glucagon  Injectable 1 milliGRAM(s) IntraMuscular once  hydrALAZINE 75 milliGRAM(s) Oral every 8 hours  insulin glargine Injectable (LANTUS) 5 Unit(s) SubCutaneous at bedtime  insulin lispro (ADMELOG) corrective regimen sliding scale   SubCutaneous three times a day before meals  insulin lispro (ADMELOG) corrective regimen sliding scale   SubCutaneous at bedtime  labetalol 100 milliGRAM(s) Oral two times a day  polyethylene glycol 3350 17 Gram(s) Oral daily  senna 2 Tablet(s) Oral at bedtime    MEDICATIONS  (PRN):  acetaminophen    Suspension .. 650 milliGRAM(s) Oral every 6 hours PRN Temp greater or equal to 38C (100.4F), Mild Pain (1 - 3)  ALBUTerol    90 MICROgram(s) HFA Inhaler 2 Puff(s) Inhalation every 6 hours PRN Shortness of Breath and/or Wheezing  bisacodyl 5 milliGRAM(s) Oral every 12 hours PRN Constipation  guaiFENesin Oral Liquid (Sugar-Free) 100 milliGRAM(s) Oral every 6 hours PRN Cough    Vital Signs Last 24 Hrs  T(F): 98.2 (21 Jul 2021 07:42), Max: 98.2 (21 Jul 2021 07:42)  HR: 61 (21 Jul 2021 09:30) (61 - 80)  BP: 133/82 (21 Jul 2021 07:42) (133/82 - 165/85)  RR: 15 (21 Jul 2021 07:42) (14 - 15)  SpO2: 95% (21 Jul 2021 09:30) (95% - 98%)  I&O's Summary    20 Jul 2021 07:01  -  21 Jul 2021 07:00  --------------------------------------------------------  IN: 0 mL / OUT: 850 mL / NET: -850 mL      PHYSICAL EXAM:  General: NAD, A/O x 3  ENT: No gross hearing impairment, Moist mucous membranes, no thrush  Neck: Supple, No JVD  Lungs: Clear to auscultation bilaterally, good air entry, non-labored breathing  Cardio: RRR, S1/S2, No murmur  Abdomen: Soft, Nontender, Nondistended; Bowel sounds present  Extremities: No calf tenderness, No cyanosis, No pitting edema  Psych: Appropriate mood and affect  Neuro: Mild dysarthria     LABS:                        10.2   4.98  )-----------( 229      ( 19 Jul 2021 06:13 )             31.5     07-21    138  |  107  |  45  ----------------------------<  158  4.3   |  23  |  2.59    Ca    8.3      21 Jul 2021 07:00    TPro  6.8  /  Alb  2.7  /  TBili  0.5  /  DBili  0.1  /  AST  22  /  ALT  32  /  AlkPhos  165  07-19        eGFR if Non African American: 27 mL/min/1.73M2 (07-21-21 @ 07:00)      POCT Blood Glucose.: 150 mg/dL (21 Jul 2021 07:41)  POCT Blood Glucose.: 149 mg/dL (20 Jul 2021 21:33)  POCT Blood Glucose.: 138 mg/dL (20 Jul 2021 17:04)  POCT Blood Glucose.: 133 mg/dL (20 Jul 2021 11:33)    COVID-19 PCR: NotDetec (07-07-21 @ 21:00)  COVID-19 PCR: NotDetec (06-21-21 @ 23:30)

## 2021-07-21 NOTE — PROGRESS NOTE ADULT - ASSESSMENT
55M with HTN, DM2, HLD, depression, prior CVA, s/p acute right corona radiata CVA, now in [facility]    #Acute right corona radiata CVA and multiple subacute infarcts, suspect cardioembolic etiology  #Dysarthria due to above  -PT/OT/SLP  -Eliquis  -high intensity statin    #Essential HTN  -BP still not ideally controlled  -Will increase labetalol to 200 mg bid  -c/w hydralazine, Norvasc, Lasix (would use this cautiously)   -Renal following    #CKD3 - stable    #DM2  A1C with Estimated Average Glucose Result: 6.0 % (06-23-21 @ 07:35)  POCT Blood Glucose.: 150 mg/dL (21 Jul 2021 07:41)  POCT Blood Glucose.: 149 mg/dL (20 Jul 2021 21:33)  POCT Blood Glucose.: 138 mg/dL (20 Jul 2021 17:04)  POCT Blood Glucose.: 133 mg/dL (20 Jul 2021 11:33)  Current insulin regimen:  insulin glargine Injectable (LANTUS)   5 Unit(s) SubCutaneous (07-20-21 @ 21:34)  Resume home regimen upon d/c    #Depression  -c/w SSRI    #DVT ppx: Eliquis

## 2021-07-22 LAB
ALBUMIN SERPL ELPH-MCNC: 3 G/DL — LOW (ref 3.3–5)
ALP SERPL-CCNC: 158 U/L — HIGH (ref 30–120)
ALT FLD-CCNC: 26 U/L DA — SIGNIFICANT CHANGE UP (ref 10–60)
ANION GAP SERPL CALC-SCNC: 8 MMOL/L — SIGNIFICANT CHANGE UP (ref 5–17)
AST SERPL-CCNC: 24 U/L — SIGNIFICANT CHANGE UP (ref 10–40)
BILIRUB DIRECT SERPL-MCNC: 0.1 MG/DL — SIGNIFICANT CHANGE UP (ref 0–0.2)
BILIRUB INDIRECT FLD-MCNC: 0.3 MG/DL — SIGNIFICANT CHANGE UP (ref 0.2–1)
BILIRUB SERPL-MCNC: 0.4 MG/DL — SIGNIFICANT CHANGE UP (ref 0.2–1.2)
BUN SERPL-MCNC: 43 MG/DL — HIGH (ref 7–23)
CALCIUM SERPL-MCNC: 8.8 MG/DL — SIGNIFICANT CHANGE UP (ref 8.4–10.5)
CHLORIDE SERPL-SCNC: 107 MMOL/L — SIGNIFICANT CHANGE UP (ref 96–108)
CO2 SERPL-SCNC: 23 MMOL/L — SIGNIFICANT CHANGE UP (ref 22–31)
CREAT SERPL-MCNC: 2.52 MG/DL — HIGH (ref 0.5–1.3)
GLUCOSE BLDC GLUCOMTR-MCNC: 125 MG/DL — HIGH (ref 70–99)
GLUCOSE BLDC GLUCOMTR-MCNC: 134 MG/DL — HIGH (ref 70–99)
GLUCOSE BLDC GLUCOMTR-MCNC: 134 MG/DL — HIGH (ref 70–99)
GLUCOSE BLDC GLUCOMTR-MCNC: 150 MG/DL — HIGH (ref 70–99)
GLUCOSE SERPL-MCNC: 120 MG/DL — HIGH (ref 70–99)
HCT VFR BLD CALC: 33.5 % — LOW (ref 39–50)
HGB BLD-MCNC: 10.8 G/DL — LOW (ref 13–17)
MCHC RBC-ENTMCNC: 30 PG — SIGNIFICANT CHANGE UP (ref 27–34)
MCHC RBC-ENTMCNC: 32.2 GM/DL — SIGNIFICANT CHANGE UP (ref 32–36)
MCV RBC AUTO: 93.1 FL — SIGNIFICANT CHANGE UP (ref 80–100)
NRBC # BLD: 0 /100 WBCS — SIGNIFICANT CHANGE UP (ref 0–0)
PLATELET # BLD AUTO: 255 K/UL — SIGNIFICANT CHANGE UP (ref 150–400)
POTASSIUM SERPL-MCNC: 4.7 MMOL/L — SIGNIFICANT CHANGE UP (ref 3.5–5.3)
POTASSIUM SERPL-SCNC: 4.7 MMOL/L — SIGNIFICANT CHANGE UP (ref 3.5–5.3)
PROT SERPL-MCNC: 7.1 G/DL — SIGNIFICANT CHANGE UP (ref 6–8.3)
RBC # BLD: 3.6 M/UL — LOW (ref 4.2–5.8)
RBC # FLD: 11.2 % — SIGNIFICANT CHANGE UP (ref 10.3–14.5)
SODIUM SERPL-SCNC: 138 MMOL/L — SIGNIFICANT CHANGE UP (ref 135–145)
WBC # BLD: 5.01 K/UL — SIGNIFICANT CHANGE UP (ref 3.8–10.5)
WBC # FLD AUTO: 5.01 K/UL — SIGNIFICANT CHANGE UP (ref 3.8–10.5)

## 2021-07-22 PROCEDURE — 99232 SBSQ HOSP IP/OBS MODERATE 35: CPT

## 2021-07-22 RX ORDER — LABETALOL HCL 100 MG
1 TABLET ORAL
Qty: 60 | Refills: 0
Start: 2021-07-22

## 2021-07-22 RX ORDER — FUROSEMIDE 40 MG
1 TABLET ORAL
Qty: 30 | Refills: 0
Start: 2021-07-22

## 2021-07-22 RX ORDER — CITALOPRAM 10 MG/1
1 TABLET, FILM COATED ORAL
Qty: 30 | Refills: 0
Start: 2021-07-22

## 2021-07-22 RX ORDER — ALOGLIPTIN 12.5 MG/1
1 TABLET, FILM COATED ORAL
Qty: 30 | Refills: 0
Start: 2021-07-22 | End: 2021-08-20

## 2021-07-22 RX ORDER — HYDRALAZINE HCL 50 MG
3 TABLET ORAL
Qty: 90 | Refills: 0
Start: 2021-07-22

## 2021-07-22 RX ORDER — SITAGLIPTIN 50 MG/1
1 TABLET, FILM COATED ORAL
Qty: 30 | Refills: 0
Start: 2021-07-22 | End: 2021-08-20

## 2021-07-22 RX ORDER — AMLODIPINE BESYLATE 2.5 MG/1
1 TABLET ORAL
Qty: 30 | Refills: 0
Start: 2021-07-22

## 2021-07-22 RX ORDER — APIXABAN 2.5 MG/1
1 TABLET, FILM COATED ORAL
Qty: 60 | Refills: 0
Start: 2021-07-22

## 2021-07-22 RX ORDER — ALBUTEROL 90 UG/1
2 AEROSOL, METERED ORAL
Qty: 1 | Refills: 0
Start: 2021-07-22

## 2021-07-22 RX ORDER — ATORVASTATIN CALCIUM 80 MG/1
1 TABLET, FILM COATED ORAL
Qty: 30 | Refills: 0
Start: 2021-07-22

## 2021-07-22 RX ORDER — GABAPENTIN 400 MG/1
2 CAPSULE ORAL
Qty: 60 | Refills: 0
Start: 2021-07-22

## 2021-07-22 RX ADMIN — Medication 200 MILLIGRAM(S): at 05:32

## 2021-07-22 RX ADMIN — ATORVASTATIN CALCIUM 80 MILLIGRAM(S): 80 TABLET, FILM COATED ORAL at 21:29

## 2021-07-22 RX ADMIN — AMLODIPINE BESYLATE 10 MILLIGRAM(S): 2.5 TABLET ORAL at 17:02

## 2021-07-22 RX ADMIN — Medication 75 MILLIGRAM(S): at 05:32

## 2021-07-22 RX ADMIN — Medication 75 MILLIGRAM(S): at 21:29

## 2021-07-22 RX ADMIN — Medication 200 MILLIGRAM(S): at 17:02

## 2021-07-22 RX ADMIN — INSULIN GLARGINE 5 UNIT(S): 100 INJECTION, SOLUTION SUBCUTANEOUS at 21:29

## 2021-07-22 RX ADMIN — APIXABAN 2.5 MILLIGRAM(S): 2.5 TABLET, FILM COATED ORAL at 05:32

## 2021-07-22 RX ADMIN — Medication 75 MILLIGRAM(S): at 14:32

## 2021-07-22 RX ADMIN — Medication 40 MILLIGRAM(S): at 05:32

## 2021-07-22 RX ADMIN — GABAPENTIN 600 MILLIGRAM(S): 400 CAPSULE ORAL at 21:29

## 2021-07-22 RX ADMIN — APIXABAN 2.5 MILLIGRAM(S): 2.5 TABLET, FILM COATED ORAL at 17:02

## 2021-07-22 RX ADMIN — CITALOPRAM 20 MILLIGRAM(S): 10 TABLET, FILM COATED ORAL at 12:01

## 2021-07-22 NOTE — PROGRESS NOTE ADULT - SUBJECTIVE AND OBJECTIVE BOX
No distress    Vital Signs Last 24 Hrs  T(C): 36.8 (07-22-21 @ 07:48), Max: 36.8 (07-22-21 @ 07:48)  T(F): 98.2 (07-22-21 @ 07:48), Max: 98.2 (07-22-21 @ 07:48)  HR: 61 (07-22-21 @ 17:01) (60 - 70)  BP: 161/96 (07-22-21 @ 17:01) (132/79 - 161/96)  RR: 15 (07-22-21 @ 07:48) (14 - 15)  SpO2: 97% (07-22-21 @ 07:48) (96% - 98%)    s1s2  b/l air entry  soft, ND  LE edema R > L                                                                                     10.8   5.01  )-----------( 255      ( 22 Jul 2021 08:25 )             33.5     22 Jul 2021 08:25    138    |  107    |  43     ----------------------------<  120    4.7     |  23     |  2.52     Ca    8.8        22 Jul 2021 08:25    TPro  7.1    /  Alb  3.0    /  TBili  0.4    /  DBili  0.1    /  AST  24     /  ALT  26     /  AlkPhos  158    22 Jul 2021 08:25    LIVER FUNCTIONS - ( 22 Jul 2021 08:25 )  Alb: 3.0 g/dL / Pro: 7.1 g/dL / ALK PHOS: 158 U/L / ALT: 26 U/L DA / AST: 24 U/L / GGT: x           acetaminophen    Suspension .. 650 milliGRAM(s) Oral every 6 hours PRN  ALBUTerol    90 MICROgram(s) HFA Inhaler 2 Puff(s) Inhalation every 6 hours PRN  amLODIPine   Tablet 10 milliGRAM(s) Oral <User Schedule>  apixaban 2.5 milliGRAM(s) Oral every 12 hours  atorvastatin 80 milliGRAM(s) Oral at bedtime  bisacodyl 5 milliGRAM(s) Oral every 12 hours PRN  citalopram 20 milliGRAM(s) Oral daily  dextrose 40% Gel 15 Gram(s) Oral once  dextrose 5%. 1000 milliLiter(s) IV Continuous <Continuous>  dextrose 5%. 1000 milliLiter(s) IV Continuous <Continuous>  dextrose 50% Injectable 25 Gram(s) IV Push once  dextrose 50% Injectable 12.5 Gram(s) IV Push once  dextrose 50% Injectable 25 Gram(s) IV Push once  furosemide    Tablet 40 milliGRAM(s) Oral daily  gabapentin 600 milliGRAM(s) Oral at bedtime  glucagon  Injectable 1 milliGRAM(s) IntraMuscular once  guaiFENesin Oral Liquid (Sugar-Free) 100 milliGRAM(s) Oral every 6 hours PRN  hydrALAZINE 75 milliGRAM(s) Oral every 8 hours  insulin glargine Injectable (LANTUS) 5 Unit(s) SubCutaneous at bedtime  insulin lispro (ADMELOG) corrective regimen sliding scale   SubCutaneous three times a day before meals  insulin lispro (ADMELOG) corrective regimen sliding scale   SubCutaneous at bedtime  labetalol 200 milliGRAM(s) Oral two times a day  polyethylene glycol 3350 17 Gram(s) Oral daily  senna 2 Tablet(s) Oral at bedtime    A/P:    DM/ischemic proteinuric CKD 3, stable  Avoid nephrotoxins  SONO w/o hydro  F/u BMP  No NSIAD's  Edema  Continue Lasix  No objection to d/c on current meds from renal pov  Renal f/u as op    [phone]

## 2021-07-22 NOTE — PROGRESS NOTE ADULT - ASSESSMENT
· Assessment	  MARK ANTHONY QUINTANA is a 55M with PMHx of HTN, DM, HLD, ,depression, and prior CVA with no residual deifcits, who presented to [facility] on 06/11/2021 with dysphagia and difficulty ambulating, & dysarthria found to have an acute right corona radiata infarct. Admitted for multidisciplinary rehab program.      #Comprehensive Multidisciplinary Rehab Program:  - Gait, ADL, Functional impairments  - continue PT/OT/ SLP 3 hours a day 5 days a week  - recreation therapy     #Acute corona radiata infarct and multiple b/l subacute infarcts  - distribution of b/l subacute infarcts suggestive of cardioembolic origin  - not a candidate for tPA or thrombectomy  - c/w Eliquis 2.5mg q12h and atorvastatin 80mg qhs    #cough --Viral URI--resolved  - RVP + for parainfluenza 3  --Pulmicort  - guaiFENesin prn for the cough.    - VSS.     #RLE Edema  -venous doppler Neg    #HLD  - c/w atorvastatin    #HTN  -- Target <140/90  --Renal note reviewed  --Cont. lasix 40mg daily --Awaiting results of BMP today-- if Cr elevated  --Labetalol originally 100 mg Q12 on 7/5, now increased to 200 mg Q12H on 7/21 by hospitalist  - cont. amlodipine 10mg in evening   - Hydralazine  75 mg TID  --hospitalist following    #Acute on chronic CKD  -  Cr stable - - probably CKD3  --nephrology following-- note appreciated- Avoid nephrotoxins, trial of lasix started 7/19  SONO w/o hydro  Renal diet  -    #DM  - FS and ISS  -Lantus 5 units  -- Adjustment as per hospitalist  - DC home on Sitagliptin 25mg daily (not metformin due to JOSE).      #Neuropathy  - c/w gabapentin 600mg qhs      #Mood/Depression  - c/w Citalopram 20mg daily    #Sleep:  - Maintain quiet hours and low stim environment;  --nursing order to turn off TV at 9PM to encourage proper sleep hygiene  - cont.  trazodone    #Pain:  - Tylenol PRN  - avoid sedating meds that may affect cognitive recovery    #GI/Bowel:  - Senna 2 tabs qhs and Miralax PRN  - PO Dulcolax PRN    #/Bladder:  - continent    #Skin/ Pressure Injury Prevention:  - assessment on admission   - Turn Q2hrs in bed while awake, OOB to Chair, PT/OT/SLP     #DVT prophylaxis:  - Eliquis  - SCDs  - neg for b/l LE DVTs at [facility]    Labs:  CBC, BMP 7/19  Hepatic panel 7/19      #Dispo: IDR 07/15/2021  - OT: supervision eating/grooming/UBD; CG toilet transfer/shower transfer; CG bathing; min A LBD, Mod A toileting  - PT: CS transfer, CG ambulation 130ft with RW--decreased right foot clearance--was fitted for AFO; Min A 12 steps with 2 hand rails  - SLP: mod-severe  PS, Poor safety and insight, Moderate Memory deficits; needs assist with money management and meds,  visual deficits; (+) dysarthria; reg/thins  .  SW contacted pt's [facility] program --they are processing request to extend HHA hours. family cannot provide supervision   - TDD: DC  home once HHA hours approved--SW will follow up.     · Assessment	  MARK ANTHONY QUINTANA is a 55M with PMHx of HTN, DM, HLD, ,depression, and prior CVA with no residual deifcits, who presented to [facility] on 06/11/2021 with dysphagia and difficulty ambulating, & dysarthria found to have an acute right corona radiata infarct. Admitted for multidisciplinary rehab program.      #Comprehensive Multidisciplinary Rehab Program:  - Gait, ADL, Functional impairments  - continue PT/OT/ SLP 3 hours a day 5 days a week  - recreation therapy     #Acute corona radiata infarct and multiple b/l subacute infarcts  - distribution of b/l subacute infarcts suggestive of cardioembolic origin  - not a candidate for tPA or thrombectomy  - c/w Eliquis 2.5mg q12h and atorvastatin 80mg qhs    #cough --Viral URI--resolved  - RVP + for parainfluenza 3  --Pulmicort  - guaiFENesin prn for the cough.    - VSS.     #RLE Edema  -venous doppler Neg    #HLD  - c/w atorvastatin    #HTN  -- Target <140/90  --Renal note reviewed  --Cont. lasix 40mg daily --Awaiting results of BMP today-- if Cr significantly elevated, Renal may recommend to stop Lasix  --Labetalol  increased to 200 mg Q12H on 7/21 by hospitalist-- BP improved today  - cont. amlodipine 10mg in evening   - Hydralazine  75 mg TID  --hospitalist following    #Acute on chronic CKD  -  Cr stable - - probably CKD3  --nephrology following-- note appreciated- Avoid nephrotoxins, trial of lasix started 7/19  SONO w/o hydro  Renal diet  -    #DM  - FS and ISS  -Lantus 5 units  -- Adjustment as per hospitalist  - DC home on Sitagliptin 25mg daily (not metformin due to JOSE).      #Neuropathy  - c/w gabapentin 600mg qhs      #Mood/Depression  - c/w Citalopram 20mg daily    #Sleep:  - Maintain quiet hours and low stim environment;  --nursing order to turn off TV at 9PM to encourage proper sleep hygiene      #Pain:  - Tylenol PRN  - avoid sedating meds that may affect cognitive recovery    #GI/Bowel:  - Senna 2 tabs qhs and Miralax PRN  - PO Dulcolax PRN    #/Bladder:  - continent    #Skin/ Pressure Injury Prevention:  - assessment on admission   - Turn Q2hrs in bed while awake, OOB to Chair, PT/OT/SLP     #DVT prophylaxis:  - Eliquis  - SCDs  - neg for b/l LE DVTs at [facility]    Labs:  CBC, BMP 7/19  Hepatic panel 7/19      #Dispo: IDR 07/15/2021  - OT: supervision eating/grooming/UBD; CG toilet transfer/shower transfer; CG bathing; min A LBD, Mod A toileting  - PT: CS transfer, CG ambulation 130ft with RW--decreased right foot clearance--was fitted for AFO; Min A 12 steps with 2 hand rails  - SLP: mod-severe  PS, Poor safety and insight, Moderate Memory deficits; needs assist with money management and meds,  visual deficits; (+) dysarthria; reg/thins  .  SW contacted pt's [facility] program --they are processing request to extend HHA hours. family cannot provide supervision   - TDD: DC  home once HHA hours approved--SW will follow up.  Possible discharge today or tomorrow.    --Medications sent to [facility] for mail order delivery.     · Assessment	  MARK ANTHONY QUINTANA is a 55M with PMHx of HTN, DM, HLD, ,depression, and prior CVA with no residual deifcits, who presented to [facility] on 06/11/2021 with dysphagia and difficulty ambulating, & dysarthria found to have an acute right corona radiata infarct. Admitted for multidisciplinary rehab program.      #Comprehensive Multidisciplinary Rehab Program:  - Gait, ADL, Functional impairments  - continue PT/OT/ SLP 3 hours a day 5 days a week  - recreation therapy     #Acute corona radiata infarct and multiple b/l subacute infarcts  - distribution of b/l subacute infarcts suggestive of cardioembolic origin  - not a candidate for tPA or thrombectomy  - c/w Eliquis 2.5mg q12h and atorvastatin 80mg qhs    #cough --Viral URI--resolved  - RVP + for parainfluenza 3  --Pulmicort  - guaiFENesin prn for the cough.    - VSS.     #RLE Edema  -venous doppler Neg    #HLD  - c/w atorvastatin    #HTN  -- Target <140/90  --Renal note reviewed  --Cont. lasix 40mg daily --Awaiting results of BMP today-- if Cr significantly elevated, Renal may recommend to stop Lasix  --Labetalol  increased to 200 mg Q12H on 7/21 by hospitalist-- BP improved today  - cont. amlodipine 10mg in evening   - Hydralazine  75 mg TID  --hospitalist following    #Acute on chronic CKD  -  Cr stable - - probably CKD3  --nephrology following-- note appreciated- Avoid nephrotoxins, trial of lasix started 7/19  SONO w/o hydro  Renal diet  -    #DM  - FS and ISS  -Lantus 5 units  -- Adjustment as per hospitalist  - DC home on Sitagliptin 25mg daily (not metformin due to OJSE).      #Neuropathy  - c/w gabapentin 600mg qhs      #Mood/Depression  - c/w Citalopram 20mg daily    #Sleep:  - Maintain quiet hours and low stim environment;  --nursing order to turn off TV at 9PM to encourage proper sleep hygiene      #Pain:  - Tylenol PRN  - avoid sedating meds that may affect cognitive recovery    #GI/Bowel:  - Senna 2 tabs qhs and Miralax PRN  - PO Dulcolax PRN    #/Bladder:  - continent    #Skin/ Pressure Injury Prevention:  - assessment on admission   - Turn Q2hrs in bed while awake, OOB to Chair, PT/OT/SLP     #DVT prophylaxis:  - Eliquis  - SCDs  - neg for b/l LE DVTs at [facility]    Labs:  CBC, BMP 7/19  Hepatic panel 7/19      #Dispo: IDR 07/22/2021  - OT: supervision eating/grooming/UBD; CG toilet transfer/shower transfer; CG bathing; min A LBD, toileting, shower  - PT: Mod I transfer, and ambulation 150ft with cane--decreased right foot clearance--was fitted for AFO; Supervision 12 steps with 2 hand rails  - SLP: mod-severe  PS, Poor safety and insight, Moderate Memory deficits; needs assist with money management and meds,  visual deficits; (+) dysarthria; reg/thins  .  SW contacted pt's [facility] program --they are processing request to extend HHA hours. family cannot provide supervision   - TDD: DC  home once HHA hours approved--SW will follow up.  Possible discharge today or tomorrow.    --Medications sent to [facility] for mail order delivery.

## 2021-07-22 NOTE — PROGRESS NOTE ADULT - SUBJECTIVE AND OBJECTIVE BOX
Patient is a 55y old  Male who presents with a chief complaint of Acute right corona radiata CVA (21 Jul 2021 19:45)      SUBJECTIVE / OVERNIGHT EVENTS:  Pt seen and examined at bedside. No acute events overnight. States he wants to go home.   Pt denies cp, palpitations, sob, abd pain, N/V, fever, chills.    ROS:  All other review of systems negative    Allergies    No Known Allergies    Intolerances        MEDICATIONS  (STANDING):  amLODIPine   Tablet 10 milliGRAM(s) Oral <User Schedule>  apixaban 2.5 milliGRAM(s) Oral every 12 hours  atorvastatin 80 milliGRAM(s) Oral at bedtime  buDESOnide    Inhalation Suspension 0.5 milliGRAM(s) Inhalation every 12 hours  citalopram 20 milliGRAM(s) Oral daily  dextrose 40% Gel 15 Gram(s) Oral once  dextrose 5%. 1000 milliLiter(s) (50 mL/Hr) IV Continuous <Continuous>  dextrose 5%. 1000 milliLiter(s) (100 mL/Hr) IV Continuous <Continuous>  dextrose 50% Injectable 25 Gram(s) IV Push once  dextrose 50% Injectable 12.5 Gram(s) IV Push once  dextrose 50% Injectable 25 Gram(s) IV Push once  furosemide    Tablet 40 milliGRAM(s) Oral daily  gabapentin 600 milliGRAM(s) Oral at bedtime  glucagon  Injectable 1 milliGRAM(s) IntraMuscular once  hydrALAZINE 75 milliGRAM(s) Oral every 8 hours  insulin glargine Injectable (LANTUS) 5 Unit(s) SubCutaneous at bedtime  insulin lispro (ADMELOG) corrective regimen sliding scale   SubCutaneous three times a day before meals  insulin lispro (ADMELOG) corrective regimen sliding scale   SubCutaneous at bedtime  labetalol 200 milliGRAM(s) Oral two times a day  polyethylene glycol 3350 17 Gram(s) Oral daily  senna 2 Tablet(s) Oral at bedtime    MEDICATIONS  (PRN):  acetaminophen    Suspension .. 650 milliGRAM(s) Oral every 6 hours PRN Temp greater or equal to 38C (100.4F), Mild Pain (1 - 3)  ALBUTerol    90 MICROgram(s) HFA Inhaler 2 Puff(s) Inhalation every 6 hours PRN Shortness of Breath and/or Wheezing  bisacodyl 5 milliGRAM(s) Oral every 12 hours PRN Constipation  guaiFENesin Oral Liquid (Sugar-Free) 100 milliGRAM(s) Oral every 6 hours PRN Cough      Vital Signs Last 24 Hrs  T(C): 36.8 (22 Jul 2021 07:48), Max: 36.8 (22 Jul 2021 07:48)  T(F): 98.2 (22 Jul 2021 07:48), Max: 98.2 (22 Jul 2021 07:48)  HR: 66 (22 Jul 2021 07:48) (60 - 70)  BP: 132/79 (22 Jul 2021 07:48) (132/79 - 159/93)  BP(mean): --  RR: 15 (22 Jul 2021 07:48) (14 - 15)  SpO2: 97% (22 Jul 2021 07:48) (95% - 98%)  CAPILLARY BLOOD GLUCOSE      POCT Blood Glucose.: 125 mg/dL (22 Jul 2021 07:47)  POCT Blood Glucose.: 163 mg/dL (21 Jul 2021 21:13)  POCT Blood Glucose.: 137 mg/dL (21 Jul 2021 16:57)  POCT Blood Glucose.: 129 mg/dL (21 Jul 2021 12:21)    I&O's Summary      PHYSICAL EXAM:  GENERAL: NAD, well-developed  HEAD:  Atraumatic, Normocephalic  EYES: EOMI, PERRLA, conjunctiva and sclera clear  NECK: Supple, No JVD  CHEST/LUNG: Clear to auscultation bilaterally; No wheeze, nonlabored breathing  HEART: Regular rate and rhythm; No murmurs, rubs, or gallops  ABDOMEN: Soft, Nontender, Nondistended; Bowel sounds present  EXTREMITIES:  2+ Peripheral Pulses, No clubbing, cyanosis, or edema  NEUROLOGY: AAOx3, mild dysarthria   PSYCH: calm, appropriate mood    LABS:                        10.8   5.01  )-----------( 255      ( 22 Jul 2021 08:25 )             33.5     07-21    138  |  107  |  45<H>  ----------------------------<  158<H>  4.3   |  23  |  2.59<H>    Ca    8.3<L>      21 Jul 2021 07:00                RADIOLOGY & ADDITIONAL TESTS:  Results Reviewed:   Imaging Personally Reviewed:  Electrocardiogram Personally Reviewed:    COORDINATION OF CARE:  Care Discussed with Consultants/Other Providers [Y/N]:  Prior or Outpatient Records Reviewed [Y/N]:

## 2021-07-22 NOTE — PROGRESS NOTE ADULT - ASSESSMENT
55M with HTN, DM2, HLD, depression, prior CVA, s/p acute right corona radiata CVA, now in [facility]    #Acute right corona radiata CVA and multiple subacute infarcts, suspect cardioembolic etiology  #Dysarthria due to above  -PT/OT/SLP  -Eliquis  -high intensity statin    #Essential HTN  -Continue Labetalol, hydralazine, Norvasc, Lasix (would use this cautiously)     #CKD3 - stable    #DM2  -HA1C 6.0   -Lantus 5Unit QHS + SSI   -Resume home regimen upon d/c    #Depression  -c/w SSRI    #DVT ppx: Eliquis

## 2021-07-22 NOTE — PROGRESS NOTE ADULT - SUBJECTIVE AND OBJECTIVE BOX
HPI:  MARK ANTHONY QUINTANA is a 55M with PMHx of HTN, DM, HLD, ,depression, and prior CVA with no residual deficits, who presented to [facility] on 06/11/2021 with several days of dysphagia and difficulty ambulating. Patient had been experiencing symptoms since 6/8 after coming home from a dental appointment. He was brought to the hospital after a wellness check from his insurance company. In the ED, his Utox tested positive for cocaine, though patient denied use, and CT head scan showed findings c/w an acute right corona radiata infarct, along with multiple b/l subacute infarcts. Patient was managed medically, as he was not a candidate for either tPA or thrombectomy.    Patient was evaluated by PM&R and therapy for functional deficits and gait/ ADL impairments and recommended acute rehabilitation. Patient was medically optimized for discharge to [facility] on 06/21/2021. Patient was seen and examined at the bedside upon arrival.  (21 Jun 2021 15:04)      PAST MEDICAL & SURGICAL HISTORY:  Cerebrovascular accident (CVA)    Hypertension    Hyperlipidemia    Diabetes mellitus    Depression        Subjective:  No new complaints-- just asking when he is going to be discharged      VITALS  Vital Signs Last 24 Hrs  T(C): 36.8 (22 Jul 2021 07:48), Max: 36.8 (22 Jul 2021 07:48)  T(F): 98.2 (22 Jul 2021 07:48), Max: 98.2 (22 Jul 2021 07:48)  HR: 66 (22 Jul 2021 07:48) (60 - 70)  BP: 132/79 (22 Jul 2021 07:48) (132/79 - 159/93)  BP(mean): --  RR: 15 (22 Jul 2021 07:48) (14 - 15)  SpO2: 97% (22 Jul 2021 07:48) (96% - 98%)    REVIEW OF SYMPTOMS  Positive: cough resolved,  cognitive deficits  Denies: headache, lightheadedness, CP, SOB, abdominal pain, dysuria, nausea, constipation      ----------------------------------------------------------------------  PHYSICAL EXAM:      Constitutional - NAD, Comfortable  HEENT - NCAT  Chest -breathing comfortably on RA,   Cardio - RRR  Abdomen -  Soft, NTND,   Extremities - right leg edema, No calf tenderness   Neurologic Exam:                    Cognitive -             Orientation: alert & awake, Ox 3     Speech - (+) mild dysarthria,  No aphasia     Cranial Nerves - Right pupil sluggish, left pupil reactive, Visual fields slightly impaired on right, flattening of right NLF, EOMI, Shoulder shrug intact, +dysarthria,      Motor -                      LEFT    UE - ShAB 5/5, EF 5/5, EE 5/5, WE 5/5,  WNL                    RIGHT UE - ShAB 5/5, EF 5/5, EE 4/5, WE 5/5,  WNL                    LEFT    LE - HF 5/5, KE 5/5, DF 5/5, PF 5/5                    RIGHT LE - HF 4/5, KF 4/5, KE 5/5, DF 3/5, PF 4/5      Sensory - diminished sensation in dorsum and plantar aspect of right foot     Coordination - FTN intact & HTS intact  Psychiatric - mood stable, appropriate    RECENT LABS                        10.8   5.01  )-----------( 255      ( 22 Jul 2021 08:25 )             33.5     07-21    138  |  107  |  45<H>  ----------------------------<  158<H>  4.3   |  23  |  2.59<H>    Ca    8.3<L>      21 Jul 2021 07:00              RADIOLOGY/OTHER RESULTS      MEDICATIONS  (STANDING):  amLODIPine   Tablet 10 milliGRAM(s) Oral <User Schedule>  apixaban 2.5 milliGRAM(s) Oral every 12 hours  atorvastatin 80 milliGRAM(s) Oral at bedtime  citalopram 20 milliGRAM(s) Oral daily  dextrose 40% Gel 15 Gram(s) Oral once  dextrose 5%. 1000 milliLiter(s) (50 mL/Hr) IV Continuous <Continuous>  dextrose 5%. 1000 milliLiter(s) (100 mL/Hr) IV Continuous <Continuous>  dextrose 50% Injectable 25 Gram(s) IV Push once  dextrose 50% Injectable 12.5 Gram(s) IV Push once  dextrose 50% Injectable 25 Gram(s) IV Push once  furosemide    Tablet 40 milliGRAM(s) Oral daily  gabapentin 600 milliGRAM(s) Oral at bedtime  glucagon  Injectable 1 milliGRAM(s) IntraMuscular once  hydrALAZINE 75 milliGRAM(s) Oral every 8 hours  insulin glargine Injectable (LANTUS) 5 Unit(s) SubCutaneous at bedtime  insulin lispro (ADMELOG) corrective regimen sliding scale   SubCutaneous three times a day before meals  insulin lispro (ADMELOG) corrective regimen sliding scale   SubCutaneous at bedtime  labetalol 200 milliGRAM(s) Oral two times a day  polyethylene glycol 3350 17 Gram(s) Oral daily  senna 2 Tablet(s) Oral at bedtime    MEDICATIONS  (PRN):  acetaminophen    Suspension .. 650 milliGRAM(s) Oral every 6 hours PRN Temp greater or equal to 38C (100.4F), Mild Pain (1 - 3)  ALBUTerol    90 MICROgram(s) HFA Inhaler 2 Puff(s) Inhalation every 6 hours PRN Shortness of Breath and/or Wheezing  bisacodyl 5 milliGRAM(s) Oral every 12 hours PRN Constipation  guaiFENesin Oral Liquid (Sugar-Free) 100 milliGRAM(s) Oral every 6 hours PRN Cough

## 2021-07-23 ENCOUNTER — TRANSCRIPTION ENCOUNTER (OUTPATIENT)
Age: 56
End: 2021-07-23

## 2021-07-23 VITALS — HEART RATE: 61 BPM | SYSTOLIC BLOOD PRESSURE: 160 MMHG | DIASTOLIC BLOOD PRESSURE: 87 MMHG

## 2021-07-23 LAB
GLUCOSE BLDC GLUCOMTR-MCNC: 106 MG/DL — HIGH (ref 70–99)
GLUCOSE BLDC GLUCOMTR-MCNC: 133 MG/DL — HIGH (ref 70–99)

## 2021-07-23 PROCEDURE — 85027 COMPLETE CBC AUTOMATED: CPT

## 2021-07-23 PROCEDURE — 71045 X-RAY EXAM CHEST 1 VIEW: CPT

## 2021-07-23 PROCEDURE — 80069 RENAL FUNCTION PANEL: CPT

## 2021-07-23 PROCEDURE — 92507 TX SP LANG VOICE COMM INDIV: CPT

## 2021-07-23 PROCEDURE — 97112 NEUROMUSCULAR REEDUCATION: CPT

## 2021-07-23 PROCEDURE — 92526 ORAL FUNCTION THERAPY: CPT

## 2021-07-23 PROCEDURE — 83930 ASSAY OF BLOOD OSMOLALITY: CPT

## 2021-07-23 PROCEDURE — 86803 HEPATITIS C AB TEST: CPT

## 2021-07-23 PROCEDURE — 80048 BASIC METABOLIC PNL TOTAL CA: CPT

## 2021-07-23 PROCEDURE — 80053 COMPREHEN METABOLIC PANEL: CPT

## 2021-07-23 PROCEDURE — 92523 SPEECH SOUND LANG COMPREHEN: CPT

## 2021-07-23 PROCEDURE — 97116 GAIT TRAINING THERAPY: CPT

## 2021-07-23 PROCEDURE — 94640 AIRWAY INHALATION TREATMENT: CPT

## 2021-07-23 PROCEDURE — 93971 EXTREMITY STUDY: CPT

## 2021-07-23 PROCEDURE — 80076 HEPATIC FUNCTION PANEL: CPT

## 2021-07-23 PROCEDURE — 82962 GLUCOSE BLOOD TEST: CPT

## 2021-07-23 PROCEDURE — 99232 SBSQ HOSP IP/OBS MODERATE 35: CPT

## 2021-07-23 PROCEDURE — 92610 EVALUATE SWALLOWING FUNCTION: CPT

## 2021-07-23 PROCEDURE — 82570 ASSAY OF URINE CREATININE: CPT

## 2021-07-23 PROCEDURE — 0225U NFCT DS DNA&RNA 21 SARSCOV2: CPT

## 2021-07-23 PROCEDURE — 97163 PT EVAL HIGH COMPLEX 45 MIN: CPT

## 2021-07-23 PROCEDURE — 83935 ASSAY OF URINE OSMOLALITY: CPT

## 2021-07-23 PROCEDURE — 84300 ASSAY OF URINE SODIUM: CPT

## 2021-07-23 PROCEDURE — 86769 SARS-COV-2 COVID-19 ANTIBODY: CPT

## 2021-07-23 PROCEDURE — 97167 OT EVAL HIGH COMPLEX 60 MIN: CPT

## 2021-07-23 PROCEDURE — U0005: CPT

## 2021-07-23 PROCEDURE — 84165 PROTEIN E-PHORESIS SERUM: CPT

## 2021-07-23 PROCEDURE — U0003: CPT

## 2021-07-23 PROCEDURE — 83036 HEMOGLOBIN GLYCOSYLATED A1C: CPT

## 2021-07-23 PROCEDURE — 85025 COMPLETE CBC W/AUTO DIFF WBC: CPT

## 2021-07-23 PROCEDURE — 76775 US EXAM ABDO BACK WALL LIM: CPT

## 2021-07-23 PROCEDURE — 84155 ASSAY OF PROTEIN SERUM: CPT

## 2021-07-23 PROCEDURE — 84550 ASSAY OF BLOOD/URIC ACID: CPT

## 2021-07-23 PROCEDURE — 93306 TTE W/DOPPLER COMPLETE: CPT

## 2021-07-23 PROCEDURE — 81001 URINALYSIS AUTO W/SCOPE: CPT

## 2021-07-23 PROCEDURE — 36415 COLL VENOUS BLD VENIPUNCTURE: CPT

## 2021-07-23 PROCEDURE — 97110 THERAPEUTIC EXERCISES: CPT

## 2021-07-23 PROCEDURE — 97530 THERAPEUTIC ACTIVITIES: CPT

## 2021-07-23 PROCEDURE — 97535 SELF CARE MNGMENT TRAINING: CPT

## 2021-07-23 PROCEDURE — 87635 SARS-COV-2 COVID-19 AMP PRB: CPT

## 2021-07-23 PROCEDURE — 82550 ASSAY OF CK (CPK): CPT

## 2021-07-23 RX ADMIN — Medication 40 MILLIGRAM(S): at 05:12

## 2021-07-23 RX ADMIN — Medication 75 MILLIGRAM(S): at 05:12

## 2021-07-23 RX ADMIN — CITALOPRAM 20 MILLIGRAM(S): 10 TABLET, FILM COATED ORAL at 11:23

## 2021-07-23 RX ADMIN — APIXABAN 2.5 MILLIGRAM(S): 2.5 TABLET, FILM COATED ORAL at 05:11

## 2021-07-23 RX ADMIN — Medication 200 MILLIGRAM(S): at 05:12

## 2021-07-23 RX ADMIN — Medication 75 MILLIGRAM(S): at 13:48

## 2021-07-23 NOTE — DISCHARGE NOTE NURSING/CASE MANAGEMENT/SOCIAL WORK - PATIENT PORTAL LINK FT
You can access the FollowMyHealth Patient Portal offered by [facility] by registering at the following website: [email or web address]. By joining [employer]’s FollowMyHealth portal, you will also be able to view your health information using other applications (apps) compatible with our system.

## 2021-07-23 NOTE — PROGRESS NOTE ADULT - SUBJECTIVE AND OBJECTIVE BOX
No distress    Vital Signs Last 24 Hrs  T(C): 36.7 (07-23-21 @ 07:48), Max: 36.7 (07-23-21 @ 07:48)  T(F): 98 (07-23-21 @ 07:48), Max: 98 (07-23-21 @ 07:48)  HR: 61 (07-23-21 @ 13:45) (61 - 79)  BP: 160/87 (07-23-21 @ 13:45) (133/78 - 165/88)  RR: 16 (07-23-21 @ 07:48) (15 - 16)  SpO2: 100% (07-23-21 @ 07:48) (96% - 100%)    s1s2  b/l air entry  soft, ND  LE edema R > L                                                                                              10.8   5.01  )-----------( 255      ( 22 Jul 2021 08:25 )             33.5     22 Jul 2021 08:25    138    |  107    |  43     ----------------------------<  120    4.7     |  23     |  2.52     Ca    8.8        22 Jul 2021 08:25    TPro  7.1    /  Alb  3.0    /  TBili  0.4    /  DBili  0.1    /  AST  24     /  ALT  26     /  AlkPhos  158    22 Jul 2021 08:25    LIVER FUNCTIONS - ( 22 Jul 2021 08:25 )  Alb: 3.0 g/dL / Pro: 7.1 g/dL / ALK PHOS: 158 U/L / ALT: 26 U/L DA / AST: 24 U/L / GGT: x           acetaminophen    Suspension .. 650 milliGRAM(s) Oral every 6 hours PRN  ALBUTerol    90 MICROgram(s) HFA Inhaler 2 Puff(s) Inhalation every 6 hours PRN  amLODIPine   Tablet 10 milliGRAM(s) Oral <User Schedule>  apixaban 2.5 milliGRAM(s) Oral every 12 hours  atorvastatin 80 milliGRAM(s) Oral at bedtime  bisacodyl 5 milliGRAM(s) Oral every 12 hours PRN  citalopram 20 milliGRAM(s) Oral daily  dextrose 40% Gel 15 Gram(s) Oral once  dextrose 5%. 1000 milliLiter(s) IV Continuous <Continuous>  dextrose 5%. 1000 milliLiter(s) IV Continuous <Continuous>  dextrose 50% Injectable 25 Gram(s) IV Push once  dextrose 50% Injectable 12.5 Gram(s) IV Push once  dextrose 50% Injectable 25 Gram(s) IV Push once  furosemide    Tablet 40 milliGRAM(s) Oral daily  gabapentin 600 milliGRAM(s) Oral at bedtime  glucagon  Injectable 1 milliGRAM(s) IntraMuscular once  guaiFENesin Oral Liquid (Sugar-Free) 100 milliGRAM(s) Oral every 6 hours PRN  hydrALAZINE 75 milliGRAM(s) Oral every 8 hours  insulin glargine Injectable (LANTUS) 5 Unit(s) SubCutaneous at bedtime  insulin lispro (ADMELOG) corrective regimen sliding scale   SubCutaneous three times a day before meals  insulin lispro (ADMELOG) corrective regimen sliding scale   SubCutaneous at bedtime  labetalol 200 milliGRAM(s) Oral two times a day  polyethylene glycol 3350 17 Gram(s) Oral daily  senna 2 Tablet(s) Oral at bedtime    A/P:    DM/ischemic proteinuric CKD 3, stable  Avoid nephrotoxins  SONO w/o hydro  No NSIAD's  Continue Lasix  Renal f/u as op    [phone]

## 2021-07-23 NOTE — PROGRESS NOTE ADULT - REASON FOR ADMISSION
Acute right corona radiata CVA

## 2021-07-23 NOTE — DISCHARGE NOTE NURSING/CASE MANAGEMENT/SOCIAL WORK - NSDCFUADDAPPT_GEN_ALL_CORE_FT
The following was scheduled:    Primary Care Doctor: Dr.Anna Mcfadden  Address: [address] [place] [address]  Date:8/18/21  Time:2pm  Please bring your discharge summary to the appointment.

## 2021-07-23 NOTE — PHARMACOTHERAPY INTERVENTION NOTE - COMMENTS
provided meds to beds and changed medication to formulary alternative for discharge and continuation of therapy. provided meds to beds and changed medication to formulary alternative for discharge and continuation of therapy. Also provided patient with a 7-day pill box and instructed to have home health aid help with filling the box once a week with the medications.

## 2021-07-23 NOTE — PROGRESS NOTE ADULT - NSICDXPILOT_GEN_ALL_CORE
[place]

## 2021-07-23 NOTE — PROGRESS NOTE ADULT - SUBJECTIVE AND OBJECTIVE BOX
HPI:  MARK ANTHONY QUINTANA is a 55M with PMHx of HTN, DM, HLD, ,depression, and prior CVA with no residual deficits, who presented to [facility] on 06/11/2021 with several days of dysphagia and difficulty ambulating. Patient had been experiencing symptoms since 6/8 after coming home from a dental appointment. He was brought to the hospital after a wellness check from his insurance company. In the ED, his Utox tested positive for cocaine, though patient denied use, and CT head scan showed findings c/w an acute right corona radiata infarct, along with multiple b/l subacute infarcts. Patient was managed medically, as he was not a candidate for either tPA or thrombectomy.    Patient was evaluated by PM&R and therapy for functional deficits and gait/ ADL impairments and recommended acute rehabilitation. Patient was medically optimized for discharge to [facility] on 06/21/2021. Patient was seen and examined at the bedside upon arrival.  (21 Jun 2021 15:04)      PAST MEDICAL & SURGICAL HISTORY:  Cerebrovascular accident (CVA)    Hypertension    Hyperlipidemia    Diabetes mellitus    Depression        Subjective:  No new complaints,  happy to be discharged.        VITALS  Vital Signs Last 24 Hrs  T(C): 36.7 (23 Jul 2021 07:48), Max: 36.7 (23 Jul 2021 07:48)  T(F): 98 (23 Jul 2021 07:48), Max: 98 (23 Jul 2021 07:48)  HR: 62 (23 Jul 2021 07:48) (60 - 79)  BP: 133/78 (23 Jul 2021 07:48) (133/78 - 165/88)  BP(mean): --  RR: 16 (23 Jul 2021 07:48) (15 - 16)  SpO2: 100% (23 Jul 2021 07:48) (96% - 100%)    REVIEW OF SYMPTOMS  Positive: cough resolved,  cognitive deficits  Denies: headache, lightheadedness, CP, SOB, abdominal pain, dysuria, nausea, constipation      ----------------------------------------------------------------------  PHYSICAL EXAM:  Constitutional - NAD, Comfortable  HEENT - NCAT  Chest -breathing comfortably on RA,   Cardio - RRR  Abdomen -  Soft, NTND,   Extremities - right leg edema, No calf tenderness   Neurologic Exam:                    Cognitive -             Orientation: alert & awake, Ox 3     Speech - (+) mild dysarthria,  No aphasia     Cranial Nerves - Right pupil sluggish, left pupil reactive, Visual fields slightly impaired on right, flattening of right NLF, EOMI, Shoulder shrug intact, +dysarthria,      Motor -                      LEFT    UE - ShAB 5/5, EF 5/5, EE 5/5, WE 5/5,  WNL                    RIGHT UE - ShAB 5/5, EF 5/5, EE 4/5, WE 5/5,  WNL                    LEFT    LE - HF 5/5, KE 5/5, DF 5/5, PF 5/5                    RIGHT LE - HF 4/5, KF 4/5, KE 5/5, DF 3/5, PF 4/5      Sensory - diminished sensation in dorsum and plantar aspect of right foot     Coordination - FTN intact & HTS intact  Psychiatric - mood stable, appropriate  RECENT LABS                        10.8   5.01  )-----------( 255      ( 22 Jul 2021 08:25 )             33.5     07-22    138  |  107  |  43<H>  ----------------------------<  120<H>  4.7   |  23  |  2.52<H>    Ca    8.8      22 Jul 2021 08:25    TPro  7.1  /  Alb  3.0<L>  /  TBili  0.4  /  DBili  0.1  /  AST  24  /  ALT  26  /  AlkPhos  158<H>  07-22            RADIOLOGY/OTHER RESULTS      MEDICATIONS  (STANDING):  amLODIPine   Tablet 10 milliGRAM(s) Oral <User Schedule>  apixaban 2.5 milliGRAM(s) Oral every 12 hours  atorvastatin 80 milliGRAM(s) Oral at bedtime  citalopram 20 milliGRAM(s) Oral daily  dextrose 40% Gel 15 Gram(s) Oral once  dextrose 5%. 1000 milliLiter(s) (50 mL/Hr) IV Continuous <Continuous>  dextrose 5%. 1000 milliLiter(s) (100 mL/Hr) IV Continuous <Continuous>  dextrose 50% Injectable 25 Gram(s) IV Push once  dextrose 50% Injectable 12.5 Gram(s) IV Push once  dextrose 50% Injectable 25 Gram(s) IV Push once  furosemide    Tablet 40 milliGRAM(s) Oral daily  gabapentin 600 milliGRAM(s) Oral at bedtime  glucagon  Injectable 1 milliGRAM(s) IntraMuscular once  hydrALAZINE 75 milliGRAM(s) Oral every 8 hours  insulin glargine Injectable (LANTUS) 5 Unit(s) SubCutaneous at bedtime  insulin lispro (ADMELOG) corrective regimen sliding scale   SubCutaneous three times a day before meals  insulin lispro (ADMELOG) corrective regimen sliding scale   SubCutaneous at bedtime  labetalol 200 milliGRAM(s) Oral two times a day  polyethylene glycol 3350 17 Gram(s) Oral daily  senna 2 Tablet(s) Oral at bedtime    MEDICATIONS  (PRN):  acetaminophen    Suspension .. 650 milliGRAM(s) Oral every 6 hours PRN Temp greater or equal to 38C (100.4F), Mild Pain (1 - 3)  ALBUTerol    90 MICROgram(s) HFA Inhaler 2 Puff(s) Inhalation every 6 hours PRN Shortness of Breath and/or Wheezing  bisacodyl 5 milliGRAM(s) Oral every 12 hours PRN Constipation  guaiFENesin Oral Liquid (Sugar-Free) 100 milliGRAM(s) Oral every 6 hours PRN Cough

## 2021-07-23 NOTE — PROGRESS NOTE ADULT - ASSESSMENT
· Assessment	  MARK ANTHONY QUINTANA is a 55M with PMHx of HTN, DM, HLD, ,depression, and prior CVA with no residual deifcits, who presented to [facility] on 06/11/2021 with dysphagia and difficulty ambulating, & dysarthria found to have an acute right corona radiata infarct. Admitted for multidisciplinary rehab program.    Patient medically stable for discharge to home today.  Discharge medications and instructions reviewed with patient.  All questions answered.     #Comprehensive Multidisciplinary Rehab Program:  - Gait, ADL, Functional impairments  - continue PT/OT/ SLP --with home care therapy      #Acute corona radiata infarct and multiple b/l subacute infarcts  - distribution of b/l subacute infarcts suggestive of cardioembolic origin  - not a candidate for tPA or thrombectomy  - c/w Eliquis 2.5mg q12h and atorvastatin 80mg qhs    #cough --Viral URI--resolved  - RVP + for parainfluenza 3  --Pulmicort       #RLE Edema  -venous doppler Neg    #HLD  - c/w atorvastatin    #HTN  -- Target <140/90  --Renal note reviewed  --Cont. lasix 40mg daily as per renal-Cr remains stable  --Labetalol 200 mg Q12H on 7/21 by hospitalist-- BP improved today  - cont. amlodipine 10mg in evening   - Hydralazine  75 mg TID  --hospitalist following    #Acute on chronic CKD  -  Cr stable - - probably CKD3  --f/u with nephrology as outpatient- Avoid nephrotoxins, cont. lasix started 7/19  SONO w/o hydro  Renal diet  -    #DM  - DC home on Sitagliptin 25mg daily (not metformin due to JOSE).    #Neuropathy  - c/w gabapentin 600mg qhs      #Mood/Depression  - c/w Citalopram 20mg daily      #Pain:  - Tylenol PRN        #/Bladder:  - continent        #DVT prophylaxis:  - Eliquis          #Dispo: IDR 07/22/2021  - OT: supervision eating/grooming/UBD; CG toilet transfer/shower transfer; CG bathing; min A LBD, toileting, shower  - PT: Mod I transfer, and ambulation 150ft with cane--decreased right foot clearance--was fitted for AFO; Supervision 12 steps with 2 hand rails  - SLP: mod-severe  PS, Poor safety and insight, Moderate Memory deficits; needs assist with money management and meds,  visual deficits; (+) dysarthria; reg/thins  .  SW contacted pt's [facility] program --they are processing request to extend HHA hours. family cannot provide supervision   - TDD:discharge today  --Medications sent to [facility] for mail order delivery--delivered today

## 2021-07-23 NOTE — PROGRESS NOTE ADULT - PROVIDER SPECIALTY LIST ADULT
Hospitalist
Physiatry
Hospitalist
Nephrology
Neurology
Psychology
Rehab Medicine
Hospitalist
Nephrology
Neurology
Physiatry
Rehab Medicine
Hospitalist
Nephrology
Nephrology
Rehab Medicine
Hospitalist
Rehab Medicine
Hospitalist
Psychology
Rehab Medicine

## 2021-07-23 NOTE — PROGRESS NOTE ADULT - NUTRITIONAL ASSESSMENT
This patient has been assessed with a concern for Malnutrition and has been determined to have a diagnosis/diagnoses of Moderate protein-calorie malnutrition.    This patient is being managed with:   Diet Regular-  Consistent Carbohydrate {Evening Snack}  DASH/TLC {Sodium & Cholesterol Restricted}  Entered: Jun 25 2021  1:14PM      This patient has been assessed with a concern for Malnutrition and has been determined to have a diagnosis/diagnoses of Moderate protein-calorie malnutrition.    This patient is being managed with:   Diet Regular-  Consistent Carbohydrate {Evening Snack}  DASH/TLC {Sodium & Cholesterol Restricted}  Entered: Jun 25 2021  1:14PM    
This patient has been assessed with a concern for Malnutrition and has been determined to have a diagnosis/diagnoses of Moderate protein-calorie malnutrition.    This patient is being managed with:   Diet Regular-  Consistent Carbohydrate {Evening Snack}  DASH/TLC {Sodium & Cholesterol Restricted}  No Concentrated Potassium  Entered: Jul 6 2021  8:55PM    
This patient has been assessed with a concern for Malnutrition and has been determined to have a diagnosis/diagnoses of Moderate protein-calorie malnutrition.    This patient is being managed with:   Diet Regular-  Consistent Carbohydrate {Evening Snack}  DASH/TLC {Sodium & Cholesterol Restricted}  No Concentrated Potassium  Entered: Jul 6 2021  8:55PM    
This patient has been assessed with a concern for Malnutrition and has been determined to have a diagnosis/diagnoses of Moderate protein-calorie malnutrition.    This patient is being managed with:   Diet Regular-  Consistent Carbohydrate {Evening Snack}  DASH/TLC {Sodium & Cholesterol Restricted}  Entered: Jun 25 2021  1:14PM    
This patient has been assessed with a concern for Malnutrition and has been determined to have a diagnosis/diagnoses of Moderate protein-calorie malnutrition.    This patient is being managed with:   Diet Soft-  Consistent Carbohydrate {Evening Snack}  DASH/TLC {Sodium & Cholesterol Restricted}  Dysphagia 3 Soft Nectar Consistency Fluid (DYS3NC)  Entered: Jun 22 2021  3:52PM    
This patient has been assessed with a concern for Malnutrition and has been determined to have a diagnosis/diagnoses of Moderate protein-calorie malnutrition.    This patient is being managed with:   Diet Regular-  Consistent Carbohydrate {Evening Snack}  DASH/TLC {Sodium & Cholesterol Restricted}  No Concentrated Potassium  Entered: Jul 6 2021  8:55PM    
This patient has been assessed with a concern for Malnutrition and has been determined to have a diagnosis/diagnoses of Moderate protein-calorie malnutrition.    This patient is being managed with:   Diet Regular-  Consistent Carbohydrate {Evening Snack}  DASH/TLC {Sodium & Cholesterol Restricted}  Entered: Jun 25 2021  1:14PM    
This patient has been assessed with a concern for Malnutrition and has been determined to have a diagnosis/diagnoses of Moderate protein-calorie malnutrition.    This patient is being managed with:   Diet Regular-  Consistent Carbohydrate {Evening Snack}  DASH/TLC {Sodium & Cholesterol Restricted}  Entered: Jun 25 2021  1:14PM    
This patient has been assessed with a concern for Malnutrition and has been determined to have a diagnosis/diagnoses of Moderate protein-calorie malnutrition.    This patient is being managed with:   Diet Regular-  Consistent Carbohydrate {Evening Snack}  DASH/TLC {Sodium & Cholesterol Restricted}  No Concentrated Potassium  Entered: Jul 6 2021  8:55PM    
This patient has been assessed with a concern for Malnutrition and has been determined to have a diagnosis/diagnoses of Moderate protein-calorie malnutrition.    This patient is being managed with:   Diet Regular-  Consistent Carbohydrate {Evening Snack}  DASH/TLC {Sodium & Cholesterol Restricted}  Entered: Jun 25 2021  1:14PM    
This patient has been assessed with a concern for Malnutrition and has been determined to have a diagnosis/diagnoses of Moderate protein-calorie malnutrition.    This patient is being managed with:   Diet Regular-  Consistent Carbohydrate {Evening Snack}  DASH/TLC {Sodium & Cholesterol Restricted}  Entered: Jun 25 2021  1:14PM    
This patient has been assessed with a concern for Malnutrition and has been determined to have a diagnosis/diagnoses of Moderate protein-calorie malnutrition.    This patient is being managed with:   Diet Regular-  Consistent Carbohydrate {Evening Snack}  DASH/TLC {Sodium & Cholesterol Restricted}  No Concentrated Potassium  Entered: Jul 6 2021  8:55PM    

## 2023-02-28 NOTE — PROGRESS NOTE ADULT - SUBJECTIVE AND OBJECTIVE BOX
2/28/23 @ 16:02 left msg for Cardiac consult for [address] [place] [address] Cardiology.  Lizette Tyler HPI:  MARK ANTHONY QUINTANA is a 55M with PMHx of HTN, DM, HLD, ,depression, and prior CVA with no residual deficits, who presented to [facility] on 06/11/2021 with several days of dysphagia and difficulty ambulating. Patient had been experiencing symptoms since 6/8 after coming home from a dental appointment. He was brought to the hospital after a wellness check from his insurance company. In the ED, his Utox tested positive for cocaine, though patient denied use, and CT head scan showed findings c/w an acute right corona radiata infarct, along with multiple b/l subacute infarcts. Patient was managed medically, as he was not a candidate for either tPA or thrombectomy.    Patient was evaluated by PM&R and therapy for functional deficits and gait/ ADL impairments and recommended acute rehabilitation. Patient was medically optimized for discharge to [facility] on 06/21/2021. Patient was seen and examined at the bedside upon arrival.  (21 Jun 2021 15:04)      PAST MEDICAL & SURGICAL HISTORY:  Cerebrovascular accident (CVA)    Hypertension    Hyperlipidemia    Diabetes mellitus    Depression        Subjective:  No new complaints.       VITALS  Vital Signs Last 24 Hrs  T(C): 36.8 (06 Jul 2021 07:27), Max: 36.8 (05 Jul 2021 14:46)  T(F): 98.3 (06 Jul 2021 07:27), Max: 98.3 (05 Jul 2021 19:52)  HR: 67 (06 Jul 2021 07:27) (60 - 75)  BP: 150/88 (06 Jul 2021 07:27) (145/91 - 165/96)  BP(mean): --  RR: 14 (06 Jul 2021 07:27) (14 - 16)  SpO2: 99% (06 Jul 2021 07:27) (98% - 99%)    REVIEW OF SYMPTOMS  Neurological deficits    PHYSICAL EXAM  Constitutional - NAD, Comfortable  HEENT - NCAT  Chest - breathing comfortably on RA  Cardio - warm and well perfused,   Abdomen -  Soft, NTND,   Extremities - No peripheral edema, No calf tenderness   Neurologic Exam:                    Cognitive -             Orientation: AA O x 4     Speech - (+) mild dysarthria,  No aphasia     Cranial Nerves - Right pupil sluggish, left pupil reactive, Visual fields slightly impaired on right, flattening of right NLF, EOMI, Shoulder shrug intact, +dysarthria,      Motor -                      LEFT    UE - ShAB 5/5, EF 5/5, EE 5/5, WE 5/5,  WNL                    RIGHT UE - ShAB 5/5, EF 5/5, EE 4/5, WE 5/5,  WNL                    LEFT    LE - HF 5/5, KE 5/5, DF 5/5, PF 5/5                    RIGHT LE - 5/5        Sensory - diminished sensation in dorsum and plantar aspect of right foot     Coordination - FTN intact & HTS intact  Psychiatric - mood stable, appropriate    RECENT LABS                        11.5   4.84  )-----------( 285      ( 05 Jul 2021 06:40 )             34.9     07-06    138  |  105  |  59<H>  ----------------------------<  127<H>  5.2   |  26  |  2.82<H>    Ca    9.0      06 Jul 2021 05:00    TPro  6.5  /  Alb  2.5<L>  /  TBili  0.4  /  DBili  0.1  /  AST  39  /  ALT  41  /  AlkPhos  152<H>  07-05            RADIOLOGY/OTHER RESULTS      MEDICATIONS  (STANDING):  amLODIPine   Tablet 10 milliGRAM(s) Oral <User Schedule>  apixaban 2.5 milliGRAM(s) Oral every 12 hours  atorvastatin 80 milliGRAM(s) Oral at bedtime  citalopram 20 milliGRAM(s) Oral daily  dextrose 40% Gel 15 Gram(s) Oral once  dextrose 5%. 1000 milliLiter(s) (50 mL/Hr) IV Continuous <Continuous>  dextrose 5%. 1000 milliLiter(s) (100 mL/Hr) IV Continuous <Continuous>  dextrose 50% Injectable 25 Gram(s) IV Push once  dextrose 50% Injectable 12.5 Gram(s) IV Push once  dextrose 50% Injectable 25 Gram(s) IV Push once  gabapentin 600 milliGRAM(s) Oral at bedtime  glucagon  Injectable 1 milliGRAM(s) IntraMuscular once  hydrALAZINE 25 milliGRAM(s) Oral every 8 hours  insulin glargine Injectable (LANTUS) 8 Unit(s) SubCutaneous at bedtime  insulin lispro (ADMELOG) corrective regimen sliding scale   SubCutaneous three times a day before meals  insulin lispro (ADMELOG) corrective regimen sliding scale   SubCutaneous at bedtime  labetalol 100 milliGRAM(s) Oral two times a day  nicotine - 21 mG/24Hr(s) Patch 1 patch Transdermal daily  polyethylene glycol 3350 17 Gram(s) Oral daily  senna 2 Tablet(s) Oral at bedtime  traZODone 50 milliGRAM(s) Oral at bedtime    MEDICATIONS  (PRN):  acetaminophen    Suspension .. 650 milliGRAM(s) Oral every 6 hours PRN Temp greater or equal to 38C (100.4F), Mild Pain (1 - 3)  bisacodyl 5 milliGRAM(s) Oral every 12 hours PRN Constipation